# Patient Record
Sex: MALE | Race: WHITE | NOT HISPANIC OR LATINO | Employment: FULL TIME | URBAN - METROPOLITAN AREA
[De-identification: names, ages, dates, MRNs, and addresses within clinical notes are randomized per-mention and may not be internally consistent; named-entity substitution may affect disease eponyms.]

---

## 2021-08-02 ENCOUNTER — HOSPITAL ENCOUNTER (INPATIENT)
Facility: HOSPITAL | Age: 46
LOS: 2 days | Discharge: HOME | DRG: 683 | End: 2021-08-05
Attending: EMERGENCY MEDICINE | Admitting: INTERNAL MEDICINE

## 2021-08-02 ENCOUNTER — APPOINTMENT (EMERGENCY)
Dept: CT IMAGING | Facility: HOSPITAL | Age: 46
DRG: 683 | End: 2021-08-02

## 2021-08-02 DIAGNOSIS — S02.85XA CLOSED FRACTURE OF ORBIT, INITIAL ENCOUNTER (HCC): ICD-10-CM

## 2021-08-02 DIAGNOSIS — R74.01 ELEVATED TRANSAMINASE LEVEL: ICD-10-CM

## 2021-08-02 DIAGNOSIS — K82.8 CALCIFICATION OF GALLBLADDER: ICD-10-CM

## 2021-08-02 DIAGNOSIS — S02.401A CLOSED FRACTURE OF MAXILLARY SINUS, INITIAL ENCOUNTER (HCC): Primary | ICD-10-CM

## 2021-08-02 DIAGNOSIS — R77.8 TROPONIN LEVEL ELEVATED: ICD-10-CM

## 2021-08-02 DIAGNOSIS — T50.901A ACCIDENTAL OVERDOSE, INITIAL ENCOUNTER: ICD-10-CM

## 2021-08-02 DIAGNOSIS — R91.1 PULMONARY NODULE: ICD-10-CM

## 2021-08-02 DIAGNOSIS — R74.01 TRANSAMINITIS: ICD-10-CM

## 2021-08-02 DIAGNOSIS — R77.8 ELEVATED TROPONIN: ICD-10-CM
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
## 2021-08-02 DIAGNOSIS — R04.0 BLEEDING FROM THE NOSE: ICD-10-CM

## 2021-08-02 LAB
ALBUMIN SERPL BCP-MCNC: 4.6 G/DL (ref 3.5–5)
ALP SERPL-CCNC: 86 U/L (ref 46–116)
ALT SERPL W P-5'-P-CCNC: 176 U/L (ref 12–78)
ANION GAP SERPL CALCULATED.3IONS-SCNC: 21 MMOL/L (ref 4–13)
AST SERPL W P-5'-P-CCNC: 160 U/L (ref 5–45)
BASOPHILS # BLD AUTO: 0.04 THOUSANDS/ΜL (ref 0–0.1)
BASOPHILS NFR BLD AUTO: 0 % (ref 0–1)
BILIRUB SERPL-MCNC: 0.7 MG/DL (ref 0.2–1)
BUN SERPL-MCNC: 16 MG/DL (ref 5–25)
CALCIUM SERPL-MCNC: 8.7 MG/DL (ref 8.3–10.1)
CHLORIDE SERPL-SCNC: 102 MMOL/L (ref 100–108)
CO2 SERPL-SCNC: 21 MMOL/L (ref 21–32)
CREAT SERPL-MCNC: 1.77 MG/DL (ref 0.6–1.3)
EOSINOPHIL # BLD AUTO: 0.01 THOUSAND/ΜL (ref 0–0.61)
EOSINOPHIL NFR BLD AUTO: 0 % (ref 0–6)
ERYTHROCYTE [DISTWIDTH] IN BLOOD BY AUTOMATED COUNT: 12.8 % (ref 11.6–15.1)
GFR SERPL CREATININE-BSD FRML MDRD: 45 ML/MIN/1.73SQ M
GLUCOSE SERPL-MCNC: 122 MG/DL (ref 65–140)
HCT VFR BLD AUTO: 44.9 % (ref 36.5–49.3)
HGB BLD-MCNC: 15.4 G/DL (ref 12–17)
IMM GRANULOCYTES # BLD AUTO: 0.09 THOUSAND/UL (ref 0–0.2)
IMM GRANULOCYTES NFR BLD AUTO: 1 % (ref 0–2)
INR PPP: 1.11 (ref 0.84–1.19)
LYMPHOCYTES # BLD AUTO: 0.86 THOUSANDS/ΜL (ref 0.6–4.47)
LYMPHOCYTES NFR BLD AUTO: 7 % (ref 14–44)
MCH RBC QN AUTO: 31.6 PG (ref 26.8–34.3)
MCHC RBC AUTO-ENTMCNC: 34.3 G/DL (ref 31.4–37.4)
MCV RBC AUTO: 92 FL (ref 82–98)
MONOCYTES # BLD AUTO: 0.48 THOUSAND/ΜL (ref 0.17–1.22)
MONOCYTES NFR BLD AUTO: 4 % (ref 4–12)
NEUTROPHILS # BLD AUTO: 10.82 THOUSANDS/ΜL (ref 1.85–7.62)
NEUTS SEG NFR BLD AUTO: 88 % (ref 43–75)
NRBC BLD AUTO-RTO: 0 /100 WBCS
PLATELET # BLD AUTO: 247 THOUSANDS/UL (ref 149–390)
PMV BLD AUTO: 10.2 FL (ref 8.9–12.7)
POTASSIUM SERPL-SCNC: 3.7 MMOL/L (ref 3.5–5.3)
PROT SERPL-MCNC: 8 G/DL (ref 6.4–8.2)
PROTHROMBIN TIME: 14.4 SECONDS (ref 11.6–14.5)
RBC # BLD AUTO: 4.88 MILLION/UL (ref 3.88–5.62)
SODIUM SERPL-SCNC: 144 MMOL/L (ref 136–145)
TROPONIN I SERPL-MCNC: 0.18 NG/ML
WBC # BLD AUTO: 12.3 THOUSAND/UL (ref 4.31–10.16)

## 2021-08-02 PROCEDURE — 72125 CT NECK SPINE W/O DYE: CPT

## 2021-08-02 PROCEDURE — 99223 1ST HOSP IP/OBS HIGH 75: CPT | Performed by: EMERGENCY MEDICINE

## 2021-08-02 PROCEDURE — 93005 ELECTROCARDIOGRAM TRACING: CPT

## 2021-08-02 PROCEDURE — 96374 THER/PROPH/DIAG INJ IV PUSH: CPT

## 2021-08-02 PROCEDURE — 71260 CT THORAX DX C+: CPT

## 2021-08-02 PROCEDURE — 70486 CT MAXILLOFACIAL W/O DYE: CPT

## 2021-08-02 PROCEDURE — 70450 CT HEAD/BRAIN W/O DYE: CPT

## 2021-08-02 PROCEDURE — 36415 COLL VENOUS BLD VENIPUNCTURE: CPT | Performed by: EMERGENCY MEDICINE

## 2021-08-02 PROCEDURE — G1004 CDSM NDSC: HCPCS

## 2021-08-02 PROCEDURE — 85025 COMPLETE CBC W/AUTO DIFF WBC: CPT | Performed by: EMERGENCY MEDICINE

## 2021-08-02 PROCEDURE — 85610 PROTHROMBIN TIME: CPT | Performed by: EMERGENCY MEDICINE

## 2021-08-02 PROCEDURE — 99285 EMERGENCY DEPT VISIT HI MDM: CPT

## 2021-08-02 PROCEDURE — 74177 CT ABD & PELVIS W/CONTRAST: CPT

## 2021-08-02 PROCEDURE — 80053 COMPREHEN METABOLIC PANEL: CPT | Performed by: EMERGENCY MEDICINE

## 2021-08-02 PROCEDURE — 84484 ASSAY OF TROPONIN QUANT: CPT | Performed by: EMERGENCY MEDICINE

## 2021-08-02 PROCEDURE — 90471 IMMUNIZATION ADMIN: CPT

## 2021-08-02 RX ORDER — OXYMETAZOLINE HYDROCHLORIDE 0.05 G/100ML
2 SPRAY NASAL ONCE
Status: COMPLETED | OUTPATIENT
Start: 2021-08-02 | End: 2021-08-02

## 2021-08-02 RX ORDER — ONDANSETRON 2 MG/ML
4 INJECTION INTRAMUSCULAR; INTRAVENOUS ONCE
Status: COMPLETED | OUTPATIENT
Start: 2021-08-02 | End: 2021-08-02

## 2021-08-02 RX ORDER — ONDANSETRON 2 MG/ML
INJECTION INTRAMUSCULAR; INTRAVENOUS
Status: COMPLETED
Start: 2021-08-02 | End: 2021-08-02

## 2021-08-02 RX ADMIN — IOHEXOL 100 ML: 350 INJECTION, SOLUTION INTRAVENOUS at 22:57

## 2021-08-02 RX ADMIN — ONDANSETRON 4 MG: 2 INJECTION INTRAMUSCULAR; INTRAVENOUS at 22:21

## 2021-08-02 RX ADMIN — OXYMETAZOLINE HYDROCHLORIDE 2 SPRAY: 0.05 SPRAY NASAL at 23:00

## 2021-08-03 ENCOUNTER — APPOINTMENT (INPATIENT)
Dept: RADIOLOGY | Facility: HOSPITAL | Age: 46
DRG: 683 | End: 2021-08-03

## 2021-08-03 PROBLEM — S02.32XA CLOSED FRACTURE OF LEFT ORBITAL FLOOR (HCC): Status: ACTIVE | Noted: 2021-08-03

## 2021-08-03 PROBLEM — M25.532 PAIN AND SWELLING OF LEFT WRIST: Status: ACTIVE | Noted: 2021-08-03

## 2021-08-03 PROBLEM — T50.901A ACCIDENTAL DRUG OVERDOSE: Status: ACTIVE | Noted: 2021-08-03

## 2021-08-03 PROBLEM — D72.829 LEUKOCYTOSIS: Status: ACTIVE | Noted: 2021-08-03

## 2021-08-03 PROBLEM — R79.89 ELEVATED TROPONIN: Status: ACTIVE | Noted: 2021-08-03

## 2021-08-03 PROBLEM — R77.8 ELEVATED TROPONIN: Status: ACTIVE | Noted: 2021-08-03

## 2021-08-03 PROBLEM — N17.9 ACUTE KIDNEY INJURY (HCC): Status: ACTIVE | Noted: 2021-08-03

## 2021-08-03 PROBLEM — R74.01 ELEVATED TRANSAMINASE LEVEL: Status: ACTIVE | Noted: 2021-08-03
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
## 2021-08-03 PROBLEM — S02.401A CLOSED FRACTURE OF MAXILLARY SINUS (HCC): Status: ACTIVE | Noted: 2021-08-03

## 2021-08-03 PROBLEM — M25.432 PAIN AND SWELLING OF LEFT WRIST: Status: ACTIVE | Noted: 2021-08-03

## 2021-08-03 LAB
ALBUMIN SERPL BCP-MCNC: 4.2 G/DL (ref 3.5–5)
ALP SERPL-CCNC: 81 U/L (ref 46–116)
ALT SERPL W P-5'-P-CCNC: 395 U/L (ref 12–78)
AMPHETAMINES SERPL QL SCN: NEGATIVE
ANION GAP SERPL CALCULATED.3IONS-SCNC: 11 MMOL/L (ref 4–13)
APAP SERPL-MCNC: <2 UG/ML (ref 10–20)
AST SERPL W P-5'-P-CCNC: 346 U/L (ref 5–45)
ATRIAL RATE: 70 BPM
ATRIAL RATE: 98 BPM
BARBITURATES UR QL: NEGATIVE
BENZODIAZ UR QL: NEGATIVE
BILIRUB SERPL-MCNC: 0.37 MG/DL (ref 0.2–1)
BUN SERPL-MCNC: 19 MG/DL (ref 5–25)
CALCIUM SERPL-MCNC: 8.6 MG/DL (ref 8.3–10.1)
CHLORIDE SERPL-SCNC: 102 MMOL/L (ref 100–108)
CO2 SERPL-SCNC: 27 MMOL/L (ref 21–32)
COCAINE UR QL: NEGATIVE
CREAT SERPL-MCNC: 1.5 MG/DL (ref 0.6–1.3)
ERYTHROCYTE [DISTWIDTH] IN BLOOD BY AUTOMATED COUNT: 12.7 % (ref 11.6–15.1)
GFR SERPL CREATININE-BSD FRML MDRD: 55 ML/MIN/1.73SQ M
GLUCOSE SERPL-MCNC: 135 MG/DL (ref 65–140)
HCT VFR BLD AUTO: 41.4 % (ref 36.5–49.3)
HGB BLD-MCNC: 14 G/DL (ref 12–17)
MCH RBC QN AUTO: 30.8 PG (ref 26.8–34.3)
MCHC RBC AUTO-ENTMCNC: 33.8 G/DL (ref 31.4–37.4)
MCV RBC AUTO: 91 FL (ref 82–98)
METHADONE UR QL: NEGATIVE
OPIATES UR QL SCN: NEGATIVE
OXYCODONE+OXYMORPHONE UR QL SCN: NEGATIVE
P AXIS: 72 DEGREES
P AXIS: 75 DEGREES
PCP UR QL: NEGATIVE
PLATELET # BLD AUTO: 221 THOUSANDS/UL (ref 149–390)
PMV BLD AUTO: 9.9 FL (ref 8.9–12.7)
POTASSIUM SERPL-SCNC: 3.8 MMOL/L (ref 3.5–5.3)
PR INTERVAL: 154 MS
PR INTERVAL: 154 MS
PROCALCITONIN SERPL-MCNC: 6.93 NG/ML
PROT SERPL-MCNC: 7.5 G/DL (ref 6.4–8.2)
QRS AXIS: 58 DEGREES
QRS AXIS: 71 DEGREES
QRSD INTERVAL: 102 MS
QRSD INTERVAL: 98 MS
QT INTERVAL: 340 MS
QT INTERVAL: 412 MS
QTC INTERVAL: 434 MS
QTC INTERVAL: 444 MS
RBC # BLD AUTO: 4.55 MILLION/UL (ref 3.88–5.62)
SALICYLATES SERPL-MCNC: <3 MG/DL (ref 3–20)
SODIUM SERPL-SCNC: 140 MMOL/L (ref 136–145)
T WAVE AXIS: 57 DEGREES
T WAVE AXIS: 71 DEGREES
THC UR QL: NEGATIVE
TROPONIN I SERPL-MCNC: 0.72 NG/ML
TROPONIN I SERPL-MCNC: 0.84 NG/ML
VENTRICULAR RATE: 70 BPM
VENTRICULAR RATE: 98 BPM
WBC # BLD AUTO: 17.29 THOUSAND/UL (ref 4.31–10.16)

## 2021-08-03 PROCEDURE — 99254 IP/OBS CNSLTJ NEW/EST MOD 60: CPT | Performed by: NURSE PRACTITIONER

## 2021-08-03 PROCEDURE — 99223 1ST HOSP IP/OBS HIGH 75: CPT | Performed by: INTERNAL MEDICINE

## 2021-08-03 PROCEDURE — 93010 ELECTROCARDIOGRAM REPORT: CPT | Performed by: INTERNAL MEDICINE

## 2021-08-03 PROCEDURE — 80143 DRUG ASSAY ACETAMINOPHEN: CPT | Performed by: EMERGENCY MEDICINE

## 2021-08-03 PROCEDURE — 84145 PROCALCITONIN (PCT): CPT

## 2021-08-03 PROCEDURE — NC001 PR NO CHARGE: Performed by: SURGERY

## 2021-08-03 PROCEDURE — 86038 ANTINUCLEAR ANTIBODIES: CPT | Performed by: INTERNAL MEDICINE

## 2021-08-03 PROCEDURE — 93005 ELECTROCARDIOGRAM TRACING: CPT

## 2021-08-03 PROCEDURE — 84484 ASSAY OF TROPONIN QUANT: CPT | Performed by: INTERNAL MEDICINE

## 2021-08-03 PROCEDURE — 36415 COLL VENOUS BLD VENIPUNCTURE: CPT | Performed by: EMERGENCY MEDICINE

## 2021-08-03 PROCEDURE — 80179 DRUG ASSAY SALICYLATE: CPT | Performed by: EMERGENCY MEDICINE

## 2021-08-03 PROCEDURE — 85027 COMPLETE CBC AUTOMATED: CPT

## 2021-08-03 PROCEDURE — 84484 ASSAY OF TROPONIN QUANT: CPT | Performed by: EMERGENCY MEDICINE

## 2021-08-03 PROCEDURE — 90715 TDAP VACCINE 7 YRS/> IM: CPT | Performed by: PHYSICIAN ASSISTANT

## 2021-08-03 PROCEDURE — 80053 COMPREHEN METABOLIC PANEL: CPT

## 2021-08-03 PROCEDURE — 80307 DRUG TEST PRSMV CHEM ANLYZR: CPT

## 2021-08-03 PROCEDURE — 99222 1ST HOSP IP/OBS MODERATE 55: CPT | Performed by: PHYSICIAN ASSISTANT

## 2021-08-03 PROCEDURE — 73130 X-RAY EXAM OF HAND: CPT

## 2021-08-03 RX ORDER — ACETAMINOPHEN 325 MG/1
650 TABLET ORAL EVERY 6 HOURS PRN
Status: DISCONTINUED | OUTPATIENT
Start: 2021-08-03 | End: 2021-08-05 | Stop reason: HOSPADM

## 2021-08-03 RX ORDER — ONDANSETRON 2 MG/ML
4 INJECTION INTRAMUSCULAR; INTRAVENOUS ONCE
Status: COMPLETED | OUTPATIENT
Start: 2021-08-03 | End: 2021-08-03

## 2021-08-03 RX ORDER — KETOROLAC TROMETHAMINE 30 MG/ML
15 INJECTION, SOLUTION INTRAMUSCULAR; INTRAVENOUS EVERY 6 HOURS PRN
Status: DISCONTINUED | OUTPATIENT
Start: 2021-08-03 | End: 2021-08-03

## 2021-08-03 RX ORDER — SODIUM CHLORIDE 9 MG/ML
125 INJECTION, SOLUTION INTRAVENOUS CONTINUOUS
Status: DISCONTINUED | OUTPATIENT
Start: 2021-08-03 | End: 2021-08-05 | Stop reason: HOSPADM

## 2021-08-03 RX ADMIN — ONDANSETRON 4 MG: 2 INJECTION INTRAMUSCULAR; INTRAVENOUS at 01:35

## 2021-08-03 RX ADMIN — SODIUM CHLORIDE 100 ML/HR: 0.9 INJECTION, SOLUTION INTRAVENOUS at 03:25

## 2021-08-03 RX ADMIN — CEFTRIAXONE 1000 MG: 1 INJECTION, POWDER, FOR SOLUTION INTRAMUSCULAR; INTRAVENOUS at 07:10

## 2021-08-03 RX ADMIN — METRONIDAZOLE 500 MG: 500 INJECTION, SOLUTION INTRAVENOUS at 09:00

## 2021-08-03 RX ADMIN — TETANUS TOXOID, REDUCED DIPHTHERIA TOXOID AND ACELLULAR PERTUSSIS VACCINE, ADSORBED 0.5 ML: 5; 2.5; 8; 8; 2.5 SUSPENSION INTRAMUSCULAR at 02:05

## 2021-08-03 NOTE — ASSESSMENT & PLAN NOTE
Per trauma:    - OMFS consult placed and decided there was no acute surgical intervention indicated   - Multimodal pain regimen with avoidance of narcotics (currently on prn IV toradol 15mg)

## 2021-08-03 NOTE — ASSESSMENT & PLAN NOTE
Creatinine was noted to 1 77 with unknown baseline  Patient endorsed going to urgent care 2 months ago due thinking he had kidney stones but this was ruled out through imaging and urine studies at [facility] after seeing urology  Creatinine 0 60 - 1 30 mg/dL 1  77High           - negative urine drug screen  - will be started on NS 100cc/hour and see if resolution of CHAGO   - continue to monitor  Creatinine down to 1 04 this morning (8/4) after giving fluids

## 2021-08-03 NOTE — ASSESSMENT & PLAN NOTE
Noted elevated troponin levels at admission  This is possibly secondary to getting CPR by patient's girlfriend along with the drug overdose  Possibly a troponin leak  Ref Range & Units 8/3/21 0325 8/3/21 0135 8/2/21 2229   Troponin I <=0 04 ng/mL 0  84High   0  72High  CM  0  18High  CM          - elevated troponins x3 with EKG reading as normal sinus rhythm  - continue to monitor on telemetry for any cardiac events  - patient denies any chest pain (typical anginal chest pain) or palpitations     - Per cardiology recommendations checking echocardiogram
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
## 2021-08-03 NOTE — CONSULTS
Oral and Maxillofacial Surgery Consult    Patient Seen Date: 08/03/21 4:31 PM       HPI: Pt is 40 y o  male  has a past medical history of Chronic back pain  Consult requested by ED for orbital floor fx and maxillary sinus fracture  Patient reports that he took percocet in the bathroom and lost consciousness falling and hitting his face  PMH:   Past Medical History:   Diagnosis Date    Chronic back pain         Allergies:   No Known Allergies    Meds:     Current Facility-Administered Medications:     acetaminophen (TYLENOL) tablet 650 mg, 650 mg, Oral, Q6H PRN, Lala Kocher, MD    ceftriaxone (ROCEPHIN) 1 g/50 mL in dextrose IVPB, 1,000 mg, Intravenous, Q24H, Ashu Huffman DO, Stopped at 08/03/21 0800    sodium chloride 0 9 % infusion, 100 mL/hr, Intravenous, Continuous, Ashu Huffman DO, Last Rate: 100 mL/hr at 08/03/21 0325, 100 mL/hr at 08/03/21 0325    trimethobenzamide (TIGAN) IM injection 200 mg, 200 mg, Intramuscular, Q6H PRN, Keysha Huffman DO  No current outpatient medications on file  PSH:   Past Surgical History:   Procedure Laterality Date    WISDOM TOOTH EXTRACTION        Family History   Problem Relation Age of Onset    Cirrhosis Mother     Cholelithiasis Mother     Diabetes type II Father         Review of Systems   Constitutional: Negative for chills, fatigue and fever  HENT: Positive for facial swelling  Negative for congestion, dental problem, ear discharge, nosebleeds, sore throat and trouble swallowing  Eyes: Negative  Negative for photophobia, pain and visual disturbance  Respiratory: Negative  Cardiovascular: Negative  Skin: Negative  Neurological: Negative  Psychiatric/Behavioral: Negative  All other systems reviewed and are negative         Temp:  [98 1 °F (36 7 °C)] 98 1 °F (36 7 °C)  HR:  [] 86  Resp:  [15-19] 16  BP: (104-157)/(61-87) 134/74  SpO2:  [92 %-99 %] 98 %       Intake/Output Summary (Last 24 hours) at 8/3/2021 [facility] filed at 8/3/2021 0950  Gross per 24 hour   Intake 150 ml   Output 250 ml   Net -100 ml          Physical Exam:  Gen: AAOx3  NAD  Resp: Unlabored on RA  Neuro: bilateralCN V2-V3 Grossly Intact  bilateral CN VII grossly intact  Head: Grossly normal , mild Facial Swelling a/w left orbit  , mild ecchymosis  , No bony step-off palpated , TTP at left orbit  No LAD  Negative Trismus  negative  Guarding  Inferior border of the mandible is palpable  Eyes: Grossly Normal , EOMI with no signs of muscle entrapment, PERRLA, no subconjunctival hemorrhage  , mild left periorbital ecchymosis/edema  No changes to vision, diplopia, exophthalmos, enophthalmos  Ears: Grossly Normal  noblood visualized in the EAC  Denieschanges in hearing  Nose: Grossly Normal, Nares clear and dry, No septum deviation, No septal hematoma  Intraoral: MOODY normal  Occlusion stable  Teeth WNL/No changes  , Partially Edentulous maxilla/mandible , Root Tips #various teeth throughout oral cavity  , No laceration present , No vestibular swelling  and No purulence, or draining fistula  FOM soft, non-elevated, non-tender  Uvula midline  Imaging: I have personally reviewed pertinent reports  and I have personally reviewed pertinent films in PACS      Assessment:  40 y o  male presents with nondisplaced left orbital floor fx and maxillary sinus fx  Based on clinical and radiographic exam patient does not require acute surgical intervention from OMFS standpoint  Plan:  - Antibiotics: Augmentin 875/125mg PO BID x 7days  - Pain Control: Analgesia as per Primary Team  - Diet: No Diet restrictions from OMFS standpoint    - Peridex 15mL swish and spit BID x7d  - Encourage good oral hygiene  - head of bed elevated  - ice to affected area as needed  - Sinus precautions: 4 weeks: no nose blowing, avoid putting pressure on sinus area, avoid strenuous activity/straining, try to sneeze with mouth open  2 weeks: no straws, spitting, smoking   Use OTC Afrin BID 2 sprays/nostril 3 days maximum as needed, OTC decongestant (e g  Sudafed) or Antihistamine (e g  Claritin-D) as needed, and saline nasal spray as needed  - F/U: No follow up required  *Follow up only needed if issue arises  , Patient should see general dentist for general oral care  D/w OMFS attg on call    Consults     Counseling / Coordination of Care  Total floor / unit time spent today 30 minutes  Greater than 50% of total time was spent with the patient and / or family counseling and / or coordination of care  A description of the counseling / coordination of care: description of injury with proposed plan of care  Discussed risks, benefits, and alternatives to treatment plan  All questions were answered  Patient in agreement with plan

## 2021-08-03 NOTE — QUICK NOTE
Cervical Collar Clearance: The patient had a CT scan of the cervical spine demonstrating no acute injury  On exam, the patient had no midline point tenderness or paresthesias/numbness/weakness in the extremities  The patient had full range of motion (was then able to flex, extend, and rotate head laterally) without pain  There were no distracting injuries and the patient was not intoxicated  The patient's cervical spine was cleared radiologically and clinically  Cervical collar removed at this time       Ada Pelayo PA-C  8/3/2021 1:29 AM

## 2021-08-03 NOTE — ASSESSMENT & PLAN NOTE
Patient noted pain and swelling of the left wrist in the anatomic snuffbox distribution  There is some noted swelling in that region  - Xray of left hand has been ordered and resulted with no acute osseous abnormality

## 2021-08-03 NOTE — ASSESSMENT & PLAN NOTE
Fall secondary to drug overdose  At this point, he does not complain of much pain besides his thumb  With his history of opiate use and what brought about his fall, we will avoid any opioids       - pain control with prn toradol IV 15mg for moderate pain and continue to monitor patient's creatinine levels if improving

## 2021-08-03 NOTE — CASE MANAGEMENT
Admitted today, not a bundle or readmission  Patient is a low risk for readmission with risk for readmission sore of 5  Patient is alert and oriented x 4 and able to participate in CM assessment  CM met with patient to introduce self, explain role, complete CM assessment, and discuss DC planning  Patient resides with ana lilia Hidalgo in a first floor apartment with 3 YUE  Patient functioned independent PTA with no use of DME  No Hx HHC or STR  No LW or POA, declined CM offer for information at this time, reports ana lilia Hidalgo as healthcare representative  No Hx MH  Patient reports being a social drinker with no Hx IP/OP substance rehab  Patient reports no Hx drug use stating the percocet taken were for his back pain, and he now sees the mistake mixing substances with alcohol  Per chart review patient snorted percocet leading to LOC and fall, has history of cocaine use, and UDS completed resulted normal  Patient drives  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 ) and will require work note at [employer]  CM made SLIM aware of same  Patient does not have PCP and declined CM offer to establish patient with one  CM offered to provide InfoLink information for patient's future reference, patient would appreciate this  InfoLink information added to follow up providers  Patient reports having insurance stating it is a [employer] and he is having SO obtain the card and bring it in to provide to registration  Patient has prescription coverage and prefers using [facility] in [facility] for medications  Patient reports plan upon medical stability is to return home with no anticipated CM needs  SO to provide transportation  CM received consult for [facility]  CM contacted [facility]  to refer patient and provide necessary information  CRS to meet with patient and follow up with CM to provide update and discuss plan of care after they meet with patient       Patient reports no concerns from CM standpoint at this time  CM encouraged patient to reach out with any questions or concerns  CM will continue to follow for further care coordination needs

## 2021-08-03 NOTE — ASSESSMENT & PLAN NOTE
Per trauma:    - OMFS consult placed and currently pending  - Multimodal pain regimen with avoidance of narcotics (currently on prn IV toradol 15mg)

## 2021-08-03 NOTE — ASSESSMENT & PLAN NOTE
Creatinine was noted to 1 77 with unknown baseline  Patient endorsed going to urgent care 2 months ago due thinking he had kidney stones but this was ruled out through imaging and urine studies at [facility] after seeing urology  Creatinine 0 60 - 1 30 mg/dL 1  77High           - negative urine drug screen  - will be started on NS 100cc/hour and see if resolution of CHAGO

## 2021-08-03 NOTE — QUICK NOTE
Left hand x-ray was reviewed by me and Dr Mary Hodge with no evidence of acute traumatic injury  Final interpretation by radiology confirmed no acute osseous abnormality  Pain patient is experiences likely secondary to contusion from fall with no neurologic deficit noted  Patient also reported that his pain has been improving with rest and ice  OMS contacted regarding facial fractures and plan is for non operative management with formal consult to follow this afternoon  No additional workup from the trauma standpoint is necessary at this time  Patient may be discharged from a trauma standpoint following completion of the [facility] evaluation and formal recommendations  Chemical DVT prophylaxis is recommended if the patient is to remain admitted  Discussed with the primary service      Brittany Marcos PA-C  8/3/2021 2:02 PM

## 2021-08-03 NOTE — CONSULTS
Consultation - Cardiology Team One  Jean Claude Johnson 40 y o  male MRN: [id number]  Unit/Bed#: ED 18 Encounter: [id number]    Inpatient consult to Cardiology  Consult performed by: LETY Sigala  Consult ordered by: Judi Burk MD      Physician Requesting Consult: Jatinder Romero MD  Reason for Consult / Principal Problem:  Elevated troponin      Assessment/ Plan    1  Troponin elevation likely Non MI elevated troponin in the setting of #2   Troponin 0 18/0 72/0 84  Reviewed ECGs  Patient has no complaint of chest pain or SOB at rest or with exertion   Only cardiac family history is grandfather having heart attack in his [de-identified]  Check echocardiogram     2  Accidental drug overdose with Percocet  Followed by primary team   Status post 8 mg intranasal Narcan     3  Acute kidney injury  Creatinine 1 77 yesterday and 1 5 today  Receiving IVF  Followed by primary team     4  Transaminitis believed to be in the setting of # 2  History of Present Illness   HPI: Jean Claude Johnson is a 40y o  year old male who has no significant medical history  He presents to [facility] ER 8/2/2021 s/p unintentional drug overdose  Patient has chronic lower back pain and was taking percocet for relief  Patient notes he took percocet by mouth then crushed and snorted percocet prior to going into shower  He also had a beer prior to taking percocet  The next thing he remembers is waking up with EMS around him  Per significant other at bedside, she heard a loud thump and found patient on the floor bleeding from his nose  She reports he was unconscious but moaning and breathing but appeared distressed  She called 911 and was instructed to start CPR  She believes she performed CPR for a few minutes prior to EMS arrived  On arrival of EMS, he was given 8 mg intranasal Narcan and became conscious  He reports no history of syncope or near syncope  Patient works as a   He reports no chest pain or SOB at rest or with exertion  He denies tobacco use  He drinks about 4 times a week and 2-3 drinks at a time  He has family history of grandfather having heart attack in his [de-identified]  EKG reviewed personally:  Sinus rhythm  Ventricular rate 70 beats per minute  TX interval 154 milliseconds  QRSD 102 milliseconds   milliseconds  QTC interval 444 milliseconds    Telemetry reviewed personally:   Normal sinus rhythm     Review of Systems   Constitutional: Negative for chills, fever and malaise/fatigue  HENT: Negative for congestion  Cardiovascular: Negative for chest pain, dyspnea on exertion, leg swelling, orthopnea and palpitations  Respiratory: Negative for cough and shortness of breath  Musculoskeletal: Positive for back pain  Gastrointestinal: Negative for bloating, nausea and vomiting  Neurological: Negative for dizziness and light-headedness  Psychiatric/Behavioral: Negative for altered mental status  All other systems reviewed and are negative      Historical Information   Past Medical History:   Diagnosis Date    Chronic back pain      Past Surgical History:   Procedure Laterality Date    WISDOM TOOTH EXTRACTION       Social History     Substance and Sexual Activity   Alcohol Use Yes    Alcohol/week: 2 0 standard drinks    Types: 1 Glasses of wine, 1 Cans of beer per week    Comment: 4 drinks/week     Social History     Substance and Sexual Activity   Drug Use Yes    Types: Oxycodone, Marijuana, Cocaine    Comment: snorted cocain about 10 years ago for 1 year     Social History     Tobacco Use   Smoking Status Current Some Day Smoker    Types: Cigars   Smokeless Tobacco Never Used   Tobacco Comment    has a cigar about 2/month     Family History:   Family History   Problem Relation Age of Onset    Cirrhosis Mother     Cholelithiasis Mother     Diabetes type II Father        Meds/Allergies   all current active meds have been reviewed and current meds:   Current Facility-Administered Medications   Medication Dose Route Frequency    ceftriaxone (ROCEPHIN) 1 g/50 mL in dextrose IVPB  1,000 mg Intravenous Q24H    ketorolac (TORADOL) injection 15 mg  15 mg Intravenous Q6H PRN    sodium chloride 0 9 % infusion  100 mL/hr Intravenous Continuous    trimethobenzamide (TIGAN) IM injection 200 mg  200 mg Intramuscular Q6H PRN     sodium chloride, 100 mL/hr, Last Rate: 100 mL/hr (21 0325)        No Known Allergies    Objective   Vitals: Blood pressure 133/80, pulse 78, temperature 98 1 °F (36 7 °C), temperature source Oral, resp  rate 16, height 6' (1 829 m), weight 93 2 kg (205 lb 7 5 oz), SpO2 99 %  ,     Body mass index is 27 87 kg/m²  ,     Systolic (68ACV), KK , Min:104 , BVU:963     Diastolic (17GFY), VGJ:35, Min:61, Max:87            Intake/Output Summary (Last 24 hours) at 8/3/2021 1435  Last data filed at 8/3/2021 4113  Gross per 24 hour   Intake 50 ml   Output 250 ml   Net -200 ml     Weight (last 2 days)     Date/Time   Weight    21 0300   93 2 (205 47)            Invasive Devices     Peripheral Intravenous Line            Peripheral IV 21 Left Antecubital 1 day    Peripheral IV 21 Left; Lower Forearm <1 day                  Physical Exam  Constitutional:       General: He is not in acute distress  HENT:      Head: Normocephalic  Mouth/Throat:      Mouth: Mucous membranes are moist    Cardiovascular:      Rate and Rhythm: Normal rate and regular rhythm  Pulses: Normal pulses  Heart sounds: No murmur heard  Pulmonary:      Effort: Pulmonary effort is normal  No respiratory distress  Breath sounds: Normal breath sounds  Abdominal:      General: Bowel sounds are normal       Palpations: Abdomen is soft  Musculoskeletal:         General: No swelling  Normal range of motion  Cervical back: Neck supple  Skin:     General: Skin is warm and dry  Capillary Refill: Capillary refill takes less than 2 seconds        Comments: L orbital ecchymosis    Neurological: General: No focal deficit present  Mental Status: He is alert and oriented to person, place, and time  Psychiatric:         Mood and Affect: Mood normal            LABORATORY RESULTS:  Results from last 7 days   Lab Units 08/03/21  0325 08/03/21  0135 08/02/21  2229   TROPONIN I ng/mL 0 84* 0 72* 0 18*     CBC with diff:   Results from last 7 days   Lab Units 08/03/21  0510 08/02/21  2229   WBC Thousand/uL 17 29* 12 30*   HEMOGLOBIN g/dL 14 0 15 4   HEMATOCRIT % 41 4 44 9   MCV fL 91 92   PLATELETS Thousands/uL 221 247   MCH pg 30 8 31 6   MCHC g/dL 33 8 34 3   RDW % 12 7 12 8   MPV fL 9 9 10 2   NRBC AUTO /100 WBCs  --  0       CMP:  Results from last 7 days   Lab Units 08/03/21  0510 08/02/21  2229   POTASSIUM mmol/L 3 8 3 7   CHLORIDE mmol/L 102 102   CO2 mmol/L 27 21   BUN mg/dL 19 16   CREATININE mg/dL 1 50* 1 77*   CALCIUM mg/dL 8 6 8 7   AST U/L 346* 160*   ALT U/L 395* 176*   ALK PHOS U/L 81 86   EGFR ml/min/1 73sq m 55 45       BMP:  Results from last 7 days   Lab Units 08/03/21  0510 08/02/21  2229   POTASSIUM mmol/L 3 8 3 7   CHLORIDE mmol/L 102 102   CO2 mmol/L 27 21   BUN mg/dL 19 16   CREATININE mg/dL 1 50* 1 77*   CALCIUM mg/dL 8 6 8 7          No results found for: NTBNP                           Results from last 7 days   Lab Units 08/02/21  2229   INR  1 11     Lipid Profile:   No results found for: CHOL  No results found for: HDL  No results found for: LDLCALC  No results found for: TRIG      Cardiac testing:   No results found for this or any previous visit  No results found for this or any previous visit  No valid procedures specified  No results found for this or any previous visit  Imaging: I have personally reviewed pertinent reports  XR hand 3+ vw left    Result Date: 8/3/2021  Narrative: LEFT HAND INDICATION:   swelling, pain, thumb and first finger interspace   COMPARISON:  None VIEWS:  XR HAND 3+ VW LEFT For the purposes of institution wide universal language the following terms will apply: (thumb=1st digit/finger, index finger=2nd digit/finger, long finger=3rd digit/finger, ring=4th digit/finger and small finger=5th digit/finger) FINDINGS: There is no acute fracture or dislocation  No significant degenerative changes  No lytic or blastic osseous lesion  Thenar eminence soft tissue swelling     Impression: No acute osseous abnormality  Workstation performed: [id number]     CT head without contrast    Result Date: 8/3/2021  Narrative: CT BRAIN - WITHOUT CONTRAST INDICATION:   Head trauma, mod-severe fall, headstrike, nosebleed  COMPARISON:  None  TECHNIQUE:  CT examination of the brain was performed  In addition to axial images, sagittal and coronal 2D reformatted images were created and submitted for interpretation  Radiation dose length product (DLP) for this visit:  [phone] mGy-cm   This examination, like all CT scans performed in the [facility], was performed utilizing techniques to minimize radiation dose exposure, including the use of iterative reconstruction and automated exposure control  IMAGE QUALITY:  Diagnostic  FINDINGS: PARENCHYMA:  No intracranial mass, mass effect or midline shift  No CT signs of acute infarction  No acute parenchymal hemorrhage  VENTRICLES AND EXTRA-AXIAL SPACES:  Normal for the patient's age  VISUALIZED ORBITS AND PARANASAL SINUSES:  Please see the separate report of the concurrent facial CT for evaluation of facial and orbital trauma  CALVARIUM AND EXTRACRANIAL SOFT TISSUES:  Normal      Impression: No acute intracranial abnormality  Please see the separate report of the concurrent facial CT for evaluation of facial and orbital trauma  Workstation performed: [id number]     CT facial bones without contrast    Result Date: 8/3/2021  Narrative: CT FACIAL BONES WITHOUT INTRAVENOUS CONTRAST INDICATION:   Facial trauma fall, headstrike with LOC  Swelling over L inferior orbit  COMPARISON: None   TECHNIQUE:  Axial CT images were obtained through the facial bones with additional sagittal and coronal reconstructions  Radiation dose length product (DLP) for this visit:  489 mGy-cm   This examination, like all CT scans performed in the [facility], was performed utilizing techniques to minimize radiation dose exposure, including the use of iterative reconstruction and automated exposure control  IMAGE QUALITY:  Diagnostic  FINDINGS: FACIAL BONES: There is a fracture of the anterior wall of the left maxillary sinus with questionable fractures of the lateral and medial walls  Subcutaneous emphysema is noted subjacent to the anterior wall fracture  There is a fracture of the inferior orbital wall through the infraorbital foramen  The inferior rectus muscle abuts the wall however does not appear entrapped on this exam   Normal alignment of the temporomandibular joints  No lytic or blastic lesion  Multiple dental carious lesions are noted  Recommend elective dental evaluation  ORBITS:  Orbital globes, optic nerves, and extraocular muscles appear symmetric and normal  There is no evidence of retrobulbar mass, abscess, or hematoma  SINUSES:  Fluid and debris are seen within the left frontal and maxillary sinuses  Amanda Akers SOFT TISSUES:  Normal      Impression: There is a fracture of the anterior wall of the left maxillary sinus with questionable fractures of the lateral and medial walls  Subcutaneous emphysema is noted subjacent to the anterior wall fracture  There is a fracture of the inferior orbital wall through the infraorbital foramen  The inferior rectus muscle abuts the wall however does not appear entrapped on this exam  Fluid, likely blood, and debris are seen within the left frontal and maxillary sinuses    The study was marked in EPIC for immediate notification   Workstation performed: [id number]     CT spine cervical without contrast    Result Date: 8/2/2021  Narrative: CT CERVICAL SPINE - WITHOUT CONTRAST INDICATION:   Neck pain, recent trauma fall, headstrike  COMPARISON:  None  TECHNIQUE:  CT examination of the cervical spine was performed without intravenous contrast   Contiguous axial images were obtained  Sagittal and coronal reconstructions were performed  Radiation dose length product (DLP) for this visit:  369 mGy-cm   This examination, like all CT scans performed in the [facility], was performed utilizing techniques to minimize radiation dose exposure, including the use of iterative reconstruction and automated exposure control  IMAGE QUALITY:  Diagnostic  FINDINGS: ALIGNMENT:  Normal alignment of the cervical spine  No subluxation  VERTEBRAL BODIES:  No fracture  DEGENERATIVE CHANGES:  No significant cervical degenerative changes are noted  PREVERTEBRAL AND PARASPINAL SOFT TISSUES:  Unremarkable  THORACIC INLET:  Normal      Impression: No cervical spine fracture or traumatic malalignment  Workstation performed: [id number]     CT chest abdomen pelvis w contrast    Result Date: 8/2/2021  Narrative: CT CHEST, ABDOMEN AND PELVIS WITH IV CONTRAST INDICATION:   Chest-abdomen-pelvis trauma, blunt fall with LOC, abdominal pain, vomiting  Received chest compressions    COMPARISON:  None  TECHNIQUE: CT examination of the chest, abdomen and pelvis was performed  Axial, sagittal, and coronal 2D reformatted images were created from the source data and submitted for interpretation  Radiation dose length product (DLP) for this visit:  621 mGy-cm   This examination, like all CT scans performed in the [facility], was performed utilizing techniques to minimize radiation dose exposure, including the use of iterative reconstruction and automated exposure control  IV Contrast:  100 mL of iohexol (OMNIPAQUE) Enteric Contrast: Enteric contrast was administered  FINDINGS: CHEST LUNGS:  7 x 4 mm nodule in the right lower lobe (2:34)   Based on current Fleischner Society 2017 Guidelines on incidental pulmonary nodule, followup non-contrast CT is recommended at 6-12 months from the initial examination and, if stable at that time, an additional followup is recommended for 18-24 months from the initial examination  Lungs are otherwise clear  There is no tracheal or endobronchial lesion  PLEURA:  Unremarkable  HEART/GREAT VESSELS:  Unremarkable for patient's age  MEDIASTINUM AND NARAYAN:  Small hiatal hernia noted  No mediastinal or hilar lymphadenopathy  CHEST WALL AND LOWER NECK:   Unremarkable  ABDOMEN LIVER/BILIARY TREE:  Liver is diffusely decreased in density consistent with fatty change  No CT evidence of suspicious hepatic mass  Normal hepatic contours  No biliary dilatation  GALLBLADDER: Questionable calcification in the wall of the gallbladder raises the possibility of gallbladder adenomyomatosis  Recommend elective ultrasound for further evaluation  No calcified gallstones  No pericholecystic inflammatory change  SPLEEN:  Unremarkable  PANCREAS:  Unremarkable  ADRENAL GLANDS:  Unremarkable  KIDNEYS/URETERS:  Unremarkable  No hydronephrosis  STOMACH AND BOWEL:  Unremarkable  APPENDIX:  A normal appendix was visualized  ABDOMINOPELVIC CAVITY:  No ascites  No pneumoperitoneum  No lymphadenopathy  VESSELS:  Unremarkable for patient's age  PELVIS REPRODUCTIVE ORGANS:  Unremarkable for patient's age  URINARY BLADDER:  Unremarkable  ABDOMINAL WALL/INGUINAL REGIONS:  Unremarkable  OSSEOUS STRUCTURES:  No acute fracture or destructive osseous lesion  Impression: 7 x 4 mm nodule in the right lower lobe (2:34)  Based on current Fleischner Society 2017 Guidelines on incidental pulmonary nodule, followup non-contrast CT is recommended at 6-12 months from the initial examination and, if stable at that time, an additional followup is recommended for 18-24 months from the initial examination  Questionable calcification in the wall of the gallbladder raises the possibility of gallbladder adenomyomatosis   Recommend elective ultrasound for further evaluation  Otherwise unremarkable CT of the chest, abdomen and pelvis with IV without oral contrast  Workstation performed: [id number]     Thank you for allowing us to participate in this patient's care  Counseling / Coordination of Care  Total floor / unit time spent today 45 minutes  Greater than 50% of total time was spent with the patient and / or family counseling and / or coordination of care  A description of the counseling / coordination of care: Review of history, current assessment, development of a plan  Code Status: Level 1 - Full Code    ** Please Note: Dragon 360 Dictation voice to text software may have been used in the creation of this document   **

## 2021-08-03 NOTE — ASSESSMENT & PLAN NOTE
Fall secondary to drug overdose  At this point, he does not complain of much pain besides his thumb   With his history of opiate use and what brought about his fall, we will avoid any opioids      - PT/OT to evaluate  - pain control with prn toradol IV 15mg for moderate pain and continue to monitor patient's creatinine levels if improving

## 2021-08-03 NOTE — ASSESSMENT & PLAN NOTE
AST 5 - 45 U/L 160High     Comment: Specimen collection should occur prior to Sulfasalazine administration due to the potential for falsely depressed results  ALT 12 - 78 U/L 176High     Comment: Specimen collection should occur prior to Sulfasalazine administration due to the potential for falsely depressed results  Ref Range & Units 8/3/21 0030   Acetaminophen Level 10 - 20 ug/mL <2Low         Noted elevated AST/ALT levels that could be secondary to drug use  - holding off on tylenol medications at this time  - continue to monitor liver function with daily CMPs     - negative urine drug screen  - possibly secondary to drug overdose usage concurrent with alcohol intake

## 2021-08-03 NOTE — ASSESSMENT & PLAN NOTE
Due to history of snorting percocet and getting it from friend/dealer as well as past history of cocaine use, there is high suspicion patient has recently taken other drugs  In addition to having elevated AST/ALT and alcohol use, it'd be worth getting a urine toxicology screen         - urine drug screen ordered was negative

## 2021-08-03 NOTE — ASSESSMENT & PLAN NOTE
- Fall from standing secondary to accidental overdose  - With below noted injuries  - Pain control - avoid narcotics with recent overdose  - PT/OT

## 2021-08-03 NOTE — ASSESSMENT & PLAN NOTE
- Left orbital floor fracture without entrapment with extension through the inferior orbital foramen  - Left facial sensation intact   - OMFS consult placed  - Multimodal pain regimen with avoidance of narcotics

## 2021-08-03 NOTE — CONSULTS
[address] 1975, 40 y o  male MRN: [id number]  Unit/Bed#: ED 18 Encounter: [id number]  Primary Care Provider: No primary care provider on file  Date and time admitted to hospital: 8/2/2021  9:44 PM    [place]  - Fall from standing secondary to accidental overdose  - With below noted injuries  - Pain control - avoid narcotics with recent overdose  - PT/OT    Closed fracture of left orbital floor [facility])  Assessment & Plan  - Left orbital floor fracture without entrapment with extension through the inferior orbital foramen  - OMFS consult placed  - Multimodal pain regimen with avoidance of narcotics      Closed fracture of maxillary sinus (HCC)  Assessment & Plan  - Left inferior maxillary sinus fracture with possible lateral and medial wall fractures - POA  - Blood and debris seen within the left maxillary and frontal sinuses  - OMFS consult   - Keep HOB >45 degrees  - [place] at bedside   - Sinus precautions   - Multimodal pain regimen    Accidental drug overdose  Assessment & Plan  - reports 15mg percocet by mouth and 15mg percocet crushed and snorted with LOC and fall  - Monitor for withdrawal      Assessment/Plan   Trauma Alert: Evaluation  Model of Arrival: Ambulance  Trauma Team: Attending Triny Ivey and SINA Truong 49  Consultants: Other: OMFS  Time Called [phone], Returned call: No    Chief Complaint: Fall    History of Present Illness   HPI:  Immanuel Meza is a 40 y o  male with pmh of chronic back pain who [facility] after an accidental overdose on percocet  Patient reports he took one percocet by mouth and then snorted one prior to getting into the shower when the next thing he knew he woke up with EMS around him  His girlfriend is present bedside and reports she heard a bang and found the patient laying on the bathroom floor gurgling  She called 911 and was instructed to start CPR which she did   He reports pain in the left side of his face, his left thumb, nausea, and intermittent nasal bleeding  He denies headache, blurry vision, chest pain, shortness of breath, abdominal pain, or neck/back pain  Mechanism:Fall    Review of Systems   Constitutional: Negative for activity change, appetite change, chills, fatigue and fever  HENT: Positive for facial swelling and nosebleeds  Negative for congestion, drooling, ear discharge, hearing loss, mouth sores, postnasal drip, rhinorrhea, sinus pressure and sinus pain  Eyes: Negative for photophobia, pain, redness and visual disturbance  Respiratory: Negative for cough, shortness of breath and wheezing  Cardiovascular: Negative for chest pain and palpitations  Gastrointestinal: Positive for nausea and vomiting  Negative for abdominal distention, abdominal pain, constipation and diarrhea  Genitourinary: Negative for flank pain, frequency, hematuria and urgency  Musculoskeletal: Negative for back pain, myalgias, neck pain and neck stiffness  Neurological: Positive for facial asymmetry (secondary to swelling)  Negative for seizures, syncope, weakness, numbness and headaches  12-point, complete review of systems was reviewed and negative except as stated above  Historical Information     Past Medical History:   Diagnosis Date    Chronic back pain      Past Surgical History:   Procedure Laterality Date    WISDOM TOOTH EXTRACTION       Social History   Social History     Substance and Sexual Activity   Alcohol Use Yes    Comment: daily      Social History     Substance and Sexual Activity   Drug Use Yes    Types: Oxycodone, Marijuana     Social History     Tobacco Use   Smoking Status Current Some Day Smoker    Types: Cigars   Smokeless Tobacco Never Used     E-Cigarette/Vaping     E-Cigarette/Vaping Substances       There is no immunization history on file for this patient    Last Tetanus: >5 years ago  Family History: Non-contributory      Meds/Allergies   all current active meds have been reviewed    No Known Allergies      PHYSICAL EXAM    Objective   Vitals:   First set: Temperature: 98 1 °F (36 7 °C) (08/02/21 2145)  Pulse: 94 (08/02/21 2145)  Respirations: 16 (08/02/21 2145)  Blood Pressure: 148/78 (08/02/21 2145)    Primary Survey:   (A) Airway: intact  (B) Breathing: equal bilaterally  (C) Circulation: Pulses:   pedal  2/4, radial  2/4 and femoral  2/4  (D) Disabliity:  GCS Total:  15  (E) Expose:  Completed    Secondary Survey: (Click on Physical Exam tab above)  Physical Exam  Vitals and nursing note reviewed  Constitutional:       General: He is not in acute distress  Appearance: Normal appearance  He is not ill-appearing or toxic-appearing  HENT:      Head: Normocephalic  Abrasion (left zygomatic arch) present  No raccoon eyes, contusion or laceration  Jaw: There is normal jaw occlusion  Right Ear: Hearing and tympanic membrane normal       Left Ear: Hearing and tympanic membrane normal       Nose: No nasal deformity, signs of injury or laceration  Left Nostril: Epistaxis (resolved) present  Left Sinus: Maxillary sinus tenderness and frontal sinus tenderness present  Mouth/Throat:      Mouth: Mucous membranes are moist       Pharynx: Oropharynx is clear  No oropharyngeal exudate or posterior oropharyngeal erythema  Eyes:      Extraocular Movements: Extraocular movements intact  Conjunctiva/sclera: Conjunctivae normal       Pupils: Pupils are equal, round, and reactive to light  Cardiovascular:      Rate and Rhythm: Normal rate and regular rhythm  Heart sounds: No murmur heard  No friction rub  No gallop  Pulmonary:      Effort: Pulmonary effort is normal       Breath sounds: No wheezing, rhonchi or rales  Abdominal:      General: Abdomen is flat  There is no distension  Palpations: Abdomen is soft  Tenderness: There is no guarding or rebound     Musculoskeletal:      Right shoulder: Normal  Left shoulder: Normal       Right upper arm: Normal       Left upper arm: Normal       Right elbow: Normal       Left elbow: Normal       Right forearm: Normal       Left forearm: Normal       Right wrist: Normal       Left wrist: Normal       Left hand: Swelling (interspace thumb and first finger) and tenderness present  Decreased range of motion  Cervical back: Normal range of motion  No deformity, signs of trauma or tenderness  Thoracic back: No signs of trauma or tenderness  Lumbar back: No signs of trauma or tenderness  Right hip: Normal       Left hip: Normal       Right upper leg: Normal       Left upper leg: Normal       Right knee: Normal       Left knee: Normal       Right lower leg: Normal       Left lower leg: Normal       Right ankle: Normal       Left ankle: Normal       Right foot: Normal       Left foot: Normal    Skin:     General: Skin is warm  Capillary Refill: Capillary refill takes less than 2 seconds  Findings: Bruising (left face) present  Neurological:      General: No focal deficit present  Mental Status: He is alert and oriented to person, place, and time  GCS: GCS eye subscore is 4  GCS verbal subscore is 5  GCS motor subscore is 6  Cranial Nerves: No cranial nerve deficit  Sensory: Sensation is intact  Motor: Motor function is intact           Invasive Devices     Peripheral Intravenous Line            Peripheral IV 08/02/21 Left Antecubital 1 day                Lab Results:   BMP/CMP:   Lab Results   Component Value Date    SODIUM 144 08/02/2021    K 3 7 08/02/2021     08/02/2021    CO2 21 08/02/2021    BUN 16 08/02/2021    CREATININE 1 77 (H) 08/02/2021    CALCIUM 8 7 08/02/2021     (H) 08/02/2021     (H) 08/02/2021    ALKPHOS 86 08/02/2021    EGFR 45 08/02/2021   , CBC:   Lab Results   Component Value Date    WBC 12 30 (H) 08/02/2021    HGB 15 4 08/02/2021    HCT 44 9 08/02/2021    MCV 92 08/02/2021  08/02/2021    MCH 31 6 08/02/2021    MCHC 34 3 08/02/2021    RDW 12 8 08/02/2021    MPV 10 2 08/02/2021    NRBC 0 08/02/2021   , Coagulation:   Lab Results   Component Value Date    INR 1 11 08/02/2021   , AST:   Lab Results   Component Value Date     (H) 08/02/2021   , ALT:   Lab Results   Component Value Date     (H) 08/02/2021   , Troponin:   Lab Results   Component Value Date    TROPONINI 0 18 (H) 08/02/2021    and UDS: No components found for: RAPIDDRUGSCREEN  Imaging/EKG Studies: CT Scan Head: No intracranial hemorrhage or skull fracture, CT Scan C-Spine: No fracture or traumatic malalignment, CT Chest: No fracture or traumatic injury, CT Scan Abdomen/Pelvis: No traumatic injury, CT Scan Face: Left inferior maxillary sinus fracture with possible lateral and medial wall fractures, subcutaneous emphysema subjacent to the anterior wall fracture, inferior orbital wall fracture through the infraorbital foramen  Inferior rectus muscle abuts the wall however does not appear entrapped  Fluid, likely blood, and debris seen within the left frontal and maxillary sinuses    Other Studies: none    Code Status: No Order
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
## 2021-08-03 NOTE — ASSESSMENT & PLAN NOTE
- AST/ALT elevated on admission with increase overnight   - suspect secondary to Percocet use  - Continue to monitor

## 2021-08-03 NOTE — ASSESSMENT & PLAN NOTE
AST 5 - 45 U/L 160High     Comment: Specimen collection should occur prior to Sulfasalazine administration due to the potential for falsely depressed results  ALT 12 - 78 U/L 176High     Comment: Specimen collection should occur prior to Sulfasalazine administration due to the potential for falsely depressed results  Ref Range & Units 8/3/21 0030   Acetaminophen Level 10 - 20 ug/mL <2Low         Noted elevated AST/ALT levels that could be secondary to drug use  - holding off on tylenol medications at this time  - continue to monitor liver function with daily CMPs     - possibly secondary to drug overdose usage concurrent with alcohol intake   - Today  and ALT 1,181 still increasing

## 2021-08-03 NOTE — ASSESSMENT & PLAN NOTE
- reports 15mg percocet by mouth and 15mg percocet crushed and snorted with LOC and fall  - Monitor for withdrawal

## 2021-08-03 NOTE — ASSESSMENT & PLAN NOTE
- Cr 1 7 on admission down to 1 5 this am  - with clinical evidence of dehydration  - Continue IVF and monitor kidney function

## 2021-08-03 NOTE — ASSESSMENT & PLAN NOTE
- Left inferior maxillary sinus fracture with possible lateral and medial wall fractures - POA  - Blood and debris seen within the left maxillary and frontal sinuses  - OMFS consult   - Keep HOB >45 degrees  - [place] at bedside   - Sinus precautions   - Multimodal pain regimen

## 2021-08-03 NOTE — ASSESSMENT & PLAN NOTE
Noted elevated troponin levels at admission  This is possibly secondary to getting CPR by patient's girlfriend along with the drug overdose  Possibly a troponin leak  Ref Range & Units 8/3/21 0135 8/2/21 2229   Troponin I <=0 04 ng/mL 0  72High   0  18High  CM                - pending 3rd troponin level at 03:30am to be taken   - EKG was ordered and was read as normal sinus rhythm

## 2021-08-03 NOTE — ASSESSMENT & PLAN NOTE
Per Trauma recommendations:    -  Left inferior maxillary sinus fracture with possible lateral and medial wall fractures - POA  - Blood and debris seen within the left maxillary and frontal sinuses  - OMFS consult and pending  - Keep HOB >45 degrees  - [place] at bedside   - Sinus precautions   - Multimodal pain regimen (currently on prn IV toradol 15mg)

## 2021-08-03 NOTE — ASSESSMENT & PLAN NOTE
- continued rise in troponin level overnight  - ?  Secondary to CPR vs ACS  - without chest pain, shortness of breath  - continue to trend

## 2021-08-03 NOTE — ED PROVIDER NOTES
Emergency Department Trauma Note  Roslyn Bumpers 40 y o  male MRN: [id number]  Unit/Bed#: ED 18/ED 18 Encounter: [id number]      Trauma Alert: Trauma Acuity: C  Model of Arrival: Mode of Arrival: ALS via    Trauma Team: Current Providers  Attending Provider: Cherise Ngo MD  Attending Provider: Gabrielle Martínez MD  Registered Nurse: Abdoul Quiñones RN  Registered Nurse: Madeleine Freeman RN  Advanced Practitioner: Binh Salcido PA-C  Consultants: None      History of Present Illness     Chief Complaint:   Chief Complaint   Patient presents with    Overdose - Accidental     Pt arrives via EMS, states he snorted a Perc 27 and was in the shower and passed out  States he believes he passed out and then hit his head on tub  As per EMS, pt's GF heard a crash and found the pt unconscious in a heap in the tub  Abrasion, ecchymosis and swelling noted to L side of forehead, L lower orbital area, and pain in L hand  Arrives with c-collar inplace, denies neck or back pain  As per EMS pt was given 8mg intranasal narcan before arousing  HPI:  Roslyn Bumpers is a 40 y o  male who presents after an episode of unresponsiveness with fall  Mechanism:Details of Incident: please see complaint narrative in chart Injury Date: 08/02/21 Injury Time: 2030 Injury Occurence Location - [address] [place] [address] Way: in pt's bathroom     78-year-old male presenting for evaluation after his girlfriend found him unresponsive outside the shower  Patient states that he has been having pain in his bilateral low back which is chronic and at baseline for the last 20 years  He snorted a Percocet 30 and then got in the shower  The next thing he remembers is waking up on the ground after EMS administered Narcan  The patient's girlfriend tells me that she heard a crash in ran into the bathroom  She found him right outside the shower face-down on the bathroom floor  She rolled him over and states that he had shallow gurgling respirations    She called EMS and was instructed to start CPR  She does not think that the patient ever stopped breathing  At the time of EMS arrival, patient was given 8 mg of intranasal Narcan at which point he woke up and was alert  He was noted to be bleeding from his bilateral nares, most prominently from the left  At the time of arrival to the emergency department patient is reporting nausea and mild abdominal pain which I think is from throwing up   He had an episode of emesis which was blood tinged, likely from his nosebleed  He continues to have slight dripping of blood from the left nostril  Denies headache or vision changes  Patient states that he only occasionally uses Percocet took only 1 this evening  He denies heroin use  Does state that he drink 2 beers at around 8:30 p m  Bell Monson Denies additional drug use  Denies pain in his neck or back after the fall  Reports mild pain to the left lateral rib cage where his girlfriend perform CPR  Patient states felt well earlier in the day and denies experiencing chest pain, shortness of breath, or palpitations prior to the incident  HPI  Review of Systems   Constitutional: Negative for chills and fever  HENT: Positive for facial swelling and nosebleeds  Negative for congestion  Eyes: Negative for visual disturbance  Respiratory: Negative for cough and shortness of breath  Cardiovascular: Negative for chest pain and leg swelling  Gastrointestinal: Positive for abdominal pain, nausea and vomiting  Negative for diarrhea  Genitourinary: Negative for flank pain  Musculoskeletal: Positive for back pain (bilateral low back, chronic and at baseline)  Negative for arthralgias, neck pain and neck stiffness  Skin: Negative for rash  Neurological: Negative for dizziness, weakness, numbness and headaches  Psychiatric/Behavioral: Negative for agitation, behavioral problems and confusion  Historical Information     Immunizations:    There is no immunization history on file for this patient  Past Medical History:   Diagnosis Date    Chronic back pain      History reviewed  No pertinent family history  Past Surgical History:   Procedure Laterality Date    WISDOM TOOTH EXTRACTION       Social History     Tobacco Use    Smoking status: Current Some Day Smoker     Types: Cigars    Smokeless tobacco: Never Used   Substance Use Topics    Alcohol use: Yes     Comment: daily     Drug use: Yes     Types: Oxycodone, Marijuana     E-Cigarette/Vaping     E-Cigarette/Vaping Substances       Family History: non-contributory    Meds/Allergies   None       No Known Allergies    PHYSICAL EXAM    PE limited by: none    Objective   Vitals:   First set: Temperature: 98 1 °F (36 7 °C) (08/02/21 2145)  Pulse: 94 (08/02/21 2145)  Respirations: 16 (08/02/21 2145)  Blood Pressure: 148/78 (08/02/21 2145)  SpO2: 98 % (08/02/21 2145)    Primary Survey:   (A) Airway: intact  (B) Breathing: bilateral breath sounds present  (C) Circulation: Pulses:   normal  (D) Disabliity:  GCS Total:  15  (E) Expose:  Completed    Secondary Survey: (Click on Physical Exam tab above)  Physical Exam  Constitutional:       General: He is not in acute distress  Appearance: He is well-developed  He is not diaphoretic  HENT:      Head: Normocephalic  Comments: Swelling over the left inferior orbit with overlying ecchymosis  Left frontal hematoma  No hemotympanum bilaterally     Right Ear: External ear normal       Left Ear: External ear normal       Nose: Nose normal       Comments: No nasal septal hematoma  Slow dripping of blood from the left nare  R nare with dried blood  Slight swelling over the nasal bridge without deviation  Eyes:      Extraocular Movements: Extraocular movements intact  Conjunctiva/sclera: Conjunctivae normal       Pupils: Pupils are equal, round, and reactive to light  Neck:      Comments: C collar in place    No midline tenderness to palpation  Cardiovascular: Rate and Rhythm: Normal rate and regular rhythm  Pulses: Normal pulses  Heart sounds: Normal heart sounds  No murmur heard  No friction rub  No gallop  Pulmonary:      Effort: Pulmonary effort is normal  No respiratory distress  Breath sounds: Normal breath sounds  No wheezing or rales  Comments: Abrasions noted over the bilateral chest wall  No bruising  Tenderness to palpation over the inferior portion of the left lateral chest wall without crepitus  Abdominal:      General: Bowel sounds are normal  There is no distension  Palpations: Abdomen is soft  Tenderness: There is no abdominal tenderness  There is no guarding  Comments: Abdomen benign to deep palpation  Abrasions noted to the bilateral flanks without ecchymosis   Musculoskeletal:         General: No deformity  Normal range of motion  Comments: Extremities atraumatic  No track marks   Skin:     General: Skin is warm and dry  Neurological:      Mental Status: He is alert and oriented to person, place, and time  Motor: No abnormal muscle tone  Comments: Normal speech  Cranial nerves 2-12 intact  5/5 strength in the proximal and distal bilateral upper and lower extremities   Psychiatric:         Mood and Affect: Mood normal          Cervical spine cleared by clinical criteria?  No (imaging required)      Invasive Devices     Peripheral Intravenous Line            Peripheral IV 08/02/21 Left Antecubital 1 day                Lab Results:   Results Reviewed     Procedure Component Value Units Date/Time    Troponin I [[id number]]     Lab Status: No result Specimen: Blood     Acetaminophen level-"If concentration is detectable, please discuss with medical  on call " [[id number]]  (Abnormal) Collected: 08/03/21 0030    Lab Status: Final result Specimen: Blood from Arm, Right Updated: 08/03/21 0056     Acetaminophen Level <2 ug/mL     Salicylate level [[id number]]  (Abnormal) Collected: 08/03/21 0030    Lab Status: Final result Specimen: Blood from Arm, Right Updated: 22/47/62 8486     Salicylate Lvl <3 mg/dL     Troponin I [[id number]]  (Abnormal) Collected: 08/02/21 2229    Lab Status: Final result Specimen: Blood from Arm, Left Updated: 08/02/21 2330     Troponin I 0 18 ng/mL     Comprehensive metabolic panel [[id number]]  (Abnormal) Collected: 08/02/21 2229    Lab Status: Final result Specimen: Blood from Arm, Left Updated: 08/02/21 2327     Sodium 144 mmol/L      Potassium 3 7 mmol/L      Chloride 102 mmol/L      CO2 21 mmol/L      ANION GAP 21 mmol/L      BUN 16 mg/dL      Creatinine 1 77 mg/dL      Glucose 122 mg/dL      Calcium 8 7 mg/dL       U/L       U/L      Alkaline Phosphatase 86 U/L      Total Protein 8 0 g/dL      Albumin 4 6 g/dL      Total Bilirubin 0 70 mg/dL      eGFR 45 ml/min/1 73sq m     Narrative:      [place] guidelines for Chronic Kidney Disease (CKD):     Stage 1 with normal or high GFR (GFR > 90 mL/min/1 73 square meters)    Stage 2 Mild CKD (GFR = 60-89 mL/min/1 73 square meters)    Stage 3A Moderate CKD (GFR = 45-59 mL/min/1 73 square meters)    Stage 3B Moderate CKD (GFR = 30-44 mL/min/1 73 square meters)    Stage 4 Severe CKD (GFR = 15-29 mL/min/1 73 square meters)    Stage 5 End Stage CKD (GFR <15 mL/min/1 73 square meters)  Note: GFR calculation is accurate only with a steady state creatinine    Protime-INR [[id number]]  (Normal) Collected: 08/02/21 2229    Lab Status: Final result Specimen: Blood from Arm, Left Updated: 08/02/21 2320     Protime 14 4 seconds      INR 1 11    CBC and differential [[id number]]  (Abnormal) Collected: 08/02/21 2229    Lab Status: Final result Specimen: Blood from Arm, Left Updated: 08/02/21 2316     WBC 12 30 Thousand/uL      RBC 4 88 Million/uL      Hemoglobin 15 4 g/dL      Hematocrit 44 9 %      MCV 92 fL      MCH 31 6 pg      MCHC 34 3 g/dL      RDW 12 8 %      MPV 10 2 fL      Platelets 142 Thousands/uL      nRBC 0 /100 WBCs      Neutrophils Relative 88 %      Immat GRANS % 1 %      Lymphocytes Relative 7 %      Monocytes Relative 4 %      Eosinophils Relative 0 %      Basophils Relative 0 %      Neutrophils Absolute 10 82 Thousands/µL      Immature Grans Absolute 0 09 Thousand/uL      Lymphocytes Absolute 0 86 Thousands/µL      Monocytes Absolute 0 48 Thousand/µL      Eosinophils Absolute 0 01 Thousand/µL      Basophils Absolute 0 04 Thousands/µL                  Imaging Studies:   Direct to CT: No  CT head without contrast   Final Result by Jose Mckeon MD (08/03 0001)      No acute intracranial abnormality  Please see the separate report of the concurrent facial CT for evaluation of facial and orbital trauma  Workstation performed: [id number]         CT spine cervical without contrast   Final Result by Jose Mckeon MD (08/02 2310)      No cervical spine fracture or traumatic malalignment  Workstation performed: [id number]         CT chest abdomen pelvis w contrast   Final Result by Jose Mckeon MD (08/02 3974)      7 x 4 mm nodule in the right lower lobe (2:34)  Based on current Fleischner Society 2017 Guidelines on incidental pulmonary nodule, followup non-contrast CT is recommended at 6-12 months from the initial examination and, if stable at that time, an    additional followup is recommended for 18-24 months from the initial examination  Questionable calcification in the wall of the gallbladder raises the possibility of gallbladder adenomyomatosis  Recommend elective ultrasound for further evaluation  Otherwise unremarkable CT of the chest, abdomen and pelvis with IV without oral contrast        Workstation performed: [id number]         CT facial bones without contrast   Final Result by Jose Mckeon MD (08/03 0001)      There is a fracture of the anterior wall of the left maxillary sinus with questionable fractures of the lateral and medial walls  Subcutaneous emphysema is noted subjacent to the anterior wall fracture  There is a fracture of the inferior orbital wall through the infraorbital foramen  The inferior rectus muscle abuts the wall however does not appear entrapped on this exam       Fluid, likely blood, and debris are seen within the left frontal and maxillary sinuses         The study was marked in EPIC for immediate notification  Workstation performed: [id number]               Procedures  Procedures         ED Course           MDM  Number of Diagnoses or Management Options  Accidental overdose, initial encounter: new and requires workup  Bleeding from the nose  Calcification of gallbladder  Closed fracture of orbit, initial encounter [facility]): new and requires workup  Pulmonary nodule  Troponin level elevated: new and requires workup  Diagnosis management comments:   I personally interpreted the patient's EKG which reveals normal rate, normal sinus rhythm, normal axis, normal intervals, no ischemic changes  Patient denies chest pain but troponin is elevated to 0 18  Possibly in the setting of CPR  Will admit for observation and continue to trend  CT head with no acute intracranial abnormalities  CT of the cervical spine with no acute fracture or malalignment  CT scan of the chest, abdomen, and pelvis without traumatic findings  Patient does have a nodule to the right lower lobe with recommendation for follow-up non contrasted CT in 6-12 months  This was discussed with the patient and his girlfriend at bedside  Patient has a questionable calcification in the wall the gallbladder raising concern for possible adenomyomatosis, this finding was also discussed with recommendation for elective outpatient ultrasound      CT of the facial bones with multiple facial fractures including the anterior wall of the left maxillary sinus with questionable fractures the lateral and medial walls, and a fracture of the inferior orbital wall through the infraorbital foramen  No evidence of entrapment on exam     Trauma consult and evaluated the patient at bedside  They have cleared the patient from a trauma perspective to be admitted to [facility] for further management of his elevated troponin and lab abnormalities  Patient had multiple episodes of vomiting in the emergency department, with blood in his emesis  He denies history of GI bleed and reports epigastric discomfort since he started vomiting but not preceding his fall  Suspect that the blood in his vomit is coming from his nasopharynx rather than from gastric bleeding  Nose bleed is hemostatic with Afrin and applying pressure  Creatinine elevated to 1 77 with unclear baseline  Mild transaminitis  Given report of snorting Percocet earlier in the night, 4 hour Tylenol level was drawn which is undetectable  Doubt Tylenol overdose  Patient denies chronic Tylenol use or chronic Percocet use to suggest chronic overdose         Amount and/or Complexity of Data Reviewed  Clinical lab tests: ordered and reviewed  Tests in the radiology section of CPT®: ordered and reviewed  Review and summarize past medical records: yes  Discuss the patient with other providers: yes  Independent visualization of images, tracings, or specimens: yes    Patient Progress  Patient progress: improved        Disposition  [facility] Transfer: No  Final diagnoses:   Accidental overdose, initial encounter   Troponin level elevated   Closed fracture of orbit, initial encounter ([facility] )   Bleeding from the nose   Pulmonary nodule   Calcification of gallbladder     Time reflects when diagnosis was documented in both MDM as applicable and the Disposition within this note     Time User Action Codes Description Comment    8/3/2021 12:36 AM Gillian Orellana Add [S02 401A] Closed fracture of maxillary sinus, initial encounter ([facility] )     8/3/2021  1:04 AM Juan Mayen Add [T50 901A] Accidental overdose, initial encounter     8/3/2021 1:04 AM Jessica Ghent Add [R77 8] Troponin level elevated     8/3/2021  1:05 AM Jessica Honor Add [S02 85XA] Closed fracture of orbit, initial encounter ([facility] )     8/3/2021  1:05 AM Jessica Ghent Add [R04 0] Bleeding from the nose     8/3/2021  1:05 AM Jessica Ghent Add [R91 1] Pulmonary nodule     8/3/2021  1:05 AM Jessica Honor Add [K82 8] Calcification of gallbladder       ED Disposition     ED Disposition Condition Date/Time Comment    Admit Stable Tu Aug 3, 2021  1:04 AM Case was discussed with SCOTT and the patient's admission status was agreed to be Admission Status: inpatient status to the service of Dr Ángela Manley  Follow-up Information    None       Patient's Medications    No medications on file     No discharge procedures on file      PDMP Review     None          ED Provider  Electronically Signed by         Rebecca Maravilla MD  08/03/21 0107       Rebecca Maravilla MD  08/03/21 6316

## 2021-08-03 NOTE — ASSESSMENT & PLAN NOTE
Per Trauma recommendations:    -  Left inferior maxillary sinus fracture with possible lateral and medial wall fractures - POA  - Blood and debris seen within the left maxillary and frontal sinuses  - Keep HOB >45 degrees  - Victoria at bedside   - Sinus precautions   - Multimodal pain regimen (currently on prn IV toradol 15mg)  - From trauma perspective he is cleared for discharge

## 2021-08-03 NOTE — PROGRESS NOTES
[facility]  Progress Note - Roslyn Bumpers 1975, 40 y o  male MRN: [id number]  Unit/Bed#: ED 18 Encounter: [id number]  Primary Care Provider: No primary care provider on file  Date and time admitted to hospital: 8/2/2021  9:44 PM    [place] from standing secondary to accidental overdose  - With below noted injuries  - Pain control - avoid narcotics with recent overdose  - PT/OT    Closed fracture of left orbital floor [facility])  Assessment & Plan  - Left orbital floor fracture without entrapment with extension through the inferior orbital foramen  - Left facial sensation intact   - OMFS consult placed  - Multimodal pain regimen with avoidance of narcotics      Closed fracture of maxillary sinus (HCC)  Assessment & Plan  - Left inferior maxillary sinus fracture with possible lateral and medial wall fractures - POA  - Blood and debris seen within the left maxillary and frontal sinuses  - OMFS consult   - Keep HOB >45 degrees  - Mary Carmenuer at bedside   - Sinus precautions   - Multimodal pain regimen    Elevated troponin  Assessment & Plan  - continued rise in troponin level overnight  - ?  Secondary to CPR vs ACS  - without chest pain, shortness of breath  - continue to trend    Elevated transaminase level  Assessment & Plan  - AST/ALT elevated on admission with increase overnight   - suspect secondary to Percocet use  - Continue to monitor     Acute kidney injury ([facility] [address] )  Assessment & Plan  - Cr 1 7 on admission down to 1 5 this am  - with clinical evidence of dehydration  - Continue IVF and monitor kidney function    * Accidental drug overdose  Assessment & Plan  - reports 15mg percocet by mouth and 15mg percocet crushed and snorted with LOC and fall  - Monitor for withdrawal        TERTIARY TRAUMA SURVEY NOTE    Prophylaxis: Sequential compression device (Venodyne)     Disposition: Pending ongoing medical treatment    Code status:  Level 1 - Full Code    Consultants: Trauma, OMFS    Is the patient 72 years or older?: No          SUBJECTIVE:     Transfer from: home  Outside Films Received: not applicable  Tertiary Exam Due on: 8/3/21    Mechanism of Injury:Fall    Details related to Injury: accidental overdose with LOC and fall     Chief Complaint: Left hand pain    HPI/Last 24 hour events: Reports no pain in his face, nasal bleeding and nausea resolved  Denies blurry or double vision  Left hand pain with swelling  Active medications:           Current Facility-Administered Medications:     ceftriaxone (ROCEPHIN) 1 g/50 mL in dextrose IVPB, 1,000 mg, Intravenous, Q24H    ketorolac (TORADOL) injection 15 mg, 15 mg, Intravenous, Q6H PRN    metroNIDAZOLE (FLAGYL) IVPB (premix) 500 mg 100 mL, 500 mg, Intravenous, Q8H    sodium chloride 0 9 % infusion, 100 mL/hr, Intravenous, Continuous, 100 mL/hr at 08/03/21 0325    trimethobenzamide (TIGAN) IM injection 200 mg, 200 mg, Intramuscular, Q6H PRN  No current outpatient medications on file  OBJECTIVE:     Vitals:   Vitals:    08/03/21 0400   BP: 110/66   Pulse: 82   Resp: 16   Temp:    SpO2: 97%       Physical Exam:   GENERAL APPEARANCE: no acute distress, sitting up in bed  NEURO: AAOX3, GCS 15, no focal neuro deficit  HEENT: PERRL, EOMI, left facial sensation intact, mucus membranes moist  CV: RRR S1 S2 without murmur, rub, or gallop  LUNGS: Clear to ausc bilaterally without wheezes, crackles, or rhonchi  GI: Soft, non-tender, non-distended  : Voiding independently  MSK: Left hand with swelling and tenderness in thumb/first finger webspace  SKIN: abrasions left face with swelling  I/O:   I/O     None          Invasive Devices: Invasive Devices     Peripheral Intravenous Line            Peripheral IV 08/02/21 Left Antecubital 1 day    Peripheral IV 08/03/21 Left; Lower Forearm <1 day                  Imaging:   CT head without contrast    Result Date: 8/3/2021  Impression: No acute intracranial abnormality   Please see the separate report of the concurrent facial CT for evaluation of facial and orbital trauma  Workstation performed: [id number]     CT facial bones without contrast    Result Date: 8/3/2021  Impression: There is a fracture of the anterior wall of the left maxillary sinus with questionable fractures of the lateral and medial walls  Subcutaneous emphysema is noted subjacent to the anterior wall fracture  There is a fracture of the inferior orbital wall through the infraorbital foramen  The inferior rectus muscle abuts the wall however does not appear entrapped on this exam  Fluid, likely blood, and debris are seen within the left frontal and maxillary sinuses    The study was marked in EPIC for immediate notification  Workstation performed: [id number]     CT spine cervical without contrast    Result Date: 8/2/2021  Impression: No cervical spine fracture or traumatic malalignment  Workstation performed: [id number]     CT chest abdomen pelvis w contrast    Result Date: 8/2/2021  Impression: 7 x 4 mm nodule in the right lower lobe (2:34)  Based on current Fleischner Society 2017 Guidelines on incidental pulmonary nodule, followup non-contrast CT is recommended at 6-12 months from the initial examination and, if stable at that time, an additional followup is recommended for 18-24 months from the initial examination  Questionable calcification in the wall of the gallbladder raises the possibility of gallbladder adenomyomatosis  Recommend elective ultrasound for further evaluation   Otherwise unremarkable CT of the chest, abdomen and pelvis with IV without oral contrast  Workstation performed: [id number]       Labs:   CBC:   Lab Results   Component Value Date    WBC 17 29 (H) 08/03/2021    HGB 14 0 08/03/2021    HCT 41 4 08/03/2021    MCV 91 08/03/2021     08/03/2021    MCH 30 8 08/03/2021    MCHC 33 8 08/03/2021    RDW 12 7 08/03/2021    MPV 9 9 08/03/2021    NRBC 0 08/02/2021     CMP:   Lab Results   Component Value Date     08/03/2021    CO2 27 08/03/2021    BUN 19 08/03/2021    CREATININE 1 50 (H) 08/03/2021    CALCIUM 8 6 08/03/2021     (H) 08/03/2021     (H) 08/03/2021    ALKPHOS 81 08/03/2021    EGFR 55 08/03/2021     Troponin:   Lab Results   Component Value Date    TROPONINI 0 84 (H) 08/03/2021

## 2021-08-03 NOTE — ASSESSMENT & PLAN NOTE
- Left orbital floor fracture without entrapment with extension through the inferior orbital foramen  - OMFS consult placed  - Multimodal pain regimen with avoidance of narcotics

## 2021-08-04 ENCOUNTER — APPOINTMENT (INPATIENT)
Dept: RADIOLOGY | Facility: HOSPITAL | Age: 46
DRG: 683 | End: 2021-08-04

## 2021-08-04 ENCOUNTER — APPOINTMENT (INPATIENT)
Dept: NON INVASIVE DIAGNOSTICS | Facility: HOSPITAL | Age: 46
DRG: 683 | End: 2021-08-04

## 2021-08-04 ENCOUNTER — APPOINTMENT (INPATIENT)
Dept: ULTRASOUND IMAGING | Facility: HOSPITAL | Age: 46
DRG: 683 | End: 2021-08-04

## 2021-08-04 PROBLEM — R74.01 TRANSAMINITIS: Status: ACTIVE | Noted: 2021-08-04

## 2021-08-04 LAB
ALBUMIN SERPL BCP-MCNC: 3.6 G/DL (ref 3.5–5)
ALBUMIN SERPL BCP-MCNC: 3.7 G/DL (ref 3.5–5)
ALP SERPL-CCNC: 71 U/L (ref 46–116)
ALP SERPL-CCNC: 74 U/L (ref 46–116)
ALT SERPL W P-5'-P-CCNC: 1181 U/L (ref 12–78)
ALT SERPL W P-5'-P-CCNC: 1291 U/L (ref 12–78)
ANION GAP SERPL CALCULATED.3IONS-SCNC: 8 MMOL/L (ref 4–13)
AST SERPL W P-5'-P-CCNC: 862 U/L (ref 5–45)
AST SERPL W P-5'-P-CCNC: 978 U/L (ref 5–45)
BILIRUB DIRECT SERPL-MCNC: 0.12 MG/DL (ref 0–0.2)
BILIRUB SERPL-MCNC: 0.42 MG/DL (ref 0.2–1)
BILIRUB SERPL-MCNC: 0.56 MG/DL (ref 0.2–1)
BUN SERPL-MCNC: 13 MG/DL (ref 5–25)
CALCIUM SERPL-MCNC: 8.6 MG/DL (ref 8.3–10.1)
CHLORIDE SERPL-SCNC: 106 MMOL/L (ref 100–108)
CHOLEST SERPL-MCNC: 180 MG/DL (ref 50–200)
CK MB SERPL-MCNC: 1.5 % (ref 0–2.5)
CK MB SERPL-MCNC: 47.2 NG/ML (ref 0–5)
CK SERPL-CCNC: 3208 U/L (ref 39–308)
CO2 SERPL-SCNC: 27 MMOL/L (ref 21–32)
CREAT SERPL-MCNC: 1.04 MG/DL (ref 0.6–1.3)
ERYTHROCYTE [DISTWIDTH] IN BLOOD BY AUTOMATED COUNT: 12.6 % (ref 11.6–15.1)
EST. AVERAGE GLUCOSE BLD GHB EST-MCNC: 97 MG/DL
GFR SERPL CREATININE-BSD FRML MDRD: 86 ML/MIN/1.73SQ M
GLUCOSE SERPL-MCNC: 96 MG/DL (ref 65–140)
HAV IGM SER QL: NORMAL
HBA1C MFR BLD: 5 %
HBV CORE IGM SER QL: NORMAL
HBV SURFACE AG SER QL: NORMAL
HCT VFR BLD AUTO: 40.2 % (ref 36.5–49.3)
HCV AB SER QL: NORMAL
HDLC SERPL-MCNC: 47 MG/DL
HGB BLD-MCNC: 14 G/DL (ref 12–17)
INR PPP: 1.04 (ref 0.84–1.19)
LDH SERPL-CCNC: 772 U/L (ref 81–234)
LDLC SERPL CALC-MCNC: 120 MG/DL (ref 0–100)
MCH RBC QN AUTO: 31.9 PG (ref 26.8–34.3)
MCHC RBC AUTO-ENTMCNC: 34.8 G/DL (ref 31.4–37.4)
MCV RBC AUTO: 92 FL (ref 82–98)
NONHDLC SERPL-MCNC: 133 MG/DL
PLATELET # BLD AUTO: 169 THOUSANDS/UL (ref 149–390)
PMV BLD AUTO: 10 FL (ref 8.9–12.7)
POTASSIUM SERPL-SCNC: 3.7 MMOL/L (ref 3.5–5.3)
PROCALCITONIN SERPL-MCNC: 7.3 NG/ML
PROT SERPL-MCNC: 6.6 G/DL (ref 6.4–8.2)
PROT SERPL-MCNC: 6.6 G/DL (ref 6.4–8.2)
PROTHROMBIN TIME: 13.7 SECONDS (ref 11.6–14.5)
RBC # BLD AUTO: 4.39 MILLION/UL (ref 3.88–5.62)
SODIUM SERPL-SCNC: 141 MMOL/L (ref 136–145)
TRIGL SERPL-MCNC: 65 MG/DL
WBC # BLD AUTO: 6.96 THOUSAND/UL (ref 4.31–10.16)

## 2021-08-04 PROCEDURE — 99232 SBSQ HOSP IP/OBS MODERATE 35: CPT | Performed by: NURSE PRACTITIONER

## 2021-08-04 PROCEDURE — 80074 ACUTE HEPATITIS PANEL: CPT | Performed by: INTERNAL MEDICINE

## 2021-08-04 PROCEDURE — 85027 COMPLETE CBC AUTOMATED: CPT

## 2021-08-04 PROCEDURE — 99232 SBSQ HOSP IP/OBS MODERATE 35: CPT | Performed by: INTERNAL MEDICINE

## 2021-08-04 PROCEDURE — 85610 PROTHROMBIN TIME: CPT | Performed by: INTERNAL MEDICINE

## 2021-08-04 PROCEDURE — 80061 LIPID PANEL: CPT

## 2021-08-04 PROCEDURE — 84145 PROCALCITONIN (PCT): CPT

## 2021-08-04 PROCEDURE — 93306 TTE W/DOPPLER COMPLETE: CPT

## 2021-08-04 PROCEDURE — 93306 TTE W/DOPPLER COMPLETE: CPT | Performed by: INTERNAL MEDICINE

## 2021-08-04 PROCEDURE — 86235 NUCLEAR ANTIGEN ANTIBODY: CPT | Performed by: INTERNAL MEDICINE

## 2021-08-04 PROCEDURE — 76705 ECHO EXAM OF ABDOMEN: CPT

## 2021-08-04 PROCEDURE — 80076 HEPATIC FUNCTION PANEL: CPT | Performed by: PHYSICIAN ASSISTANT

## 2021-08-04 PROCEDURE — 71046 X-RAY EXAM CHEST 2 VIEWS: CPT

## 2021-08-04 PROCEDURE — 99253 IP/OBS CNSLTJ NEW/EST LOW 45: CPT | Performed by: INTERNAL MEDICINE

## 2021-08-04 PROCEDURE — 36415 COLL VENOUS BLD VENIPUNCTURE: CPT

## 2021-08-04 PROCEDURE — 83615 LACTATE (LD) (LDH) ENZYME: CPT | Performed by: PHYSICIAN ASSISTANT

## 2021-08-04 PROCEDURE — 82550 ASSAY OF CK (CPK): CPT | Performed by: PHYSICIAN ASSISTANT

## 2021-08-04 PROCEDURE — 82553 CREATINE MB FRACTION: CPT | Performed by: PHYSICIAN ASSISTANT

## 2021-08-04 PROCEDURE — 83036 HEMOGLOBIN GLYCOSYLATED A1C: CPT

## 2021-08-04 PROCEDURE — 80053 COMPREHEN METABOLIC PANEL: CPT

## 2021-08-04 RX ADMIN — CEFTRIAXONE 1000 MG: 1 INJECTION, POWDER, FOR SOLUTION INTRAMUSCULAR; INTRAVENOUS at 05:20

## 2021-08-04 RX ADMIN — SODIUM CHLORIDE 125 ML/HR: 0.9 INJECTION, SOLUTION INTRAVENOUS at 16:33

## 2021-08-04 NOTE — INCIDENTAL FINDINGS
The following findings require follow up:  Radiographic finding   Finding:  7 x 4 mm nodule in the right lower lobe   Follow up required: Please follow up with your primary care provider in 1 month  Radiology recommended follow up non-contrast CT at 6-12 months from the initial examination, if stable at that time, an additional followup is recommended for 18-24 months from the initial examination  Follow up should be done within month(s)    Please notify the following clinician to assist with the follow up:    Patient does not have a PCP, however referral has been made for patient to establish care

## 2021-08-04 NOTE — CASE MANAGEMENT
CM contacted financial counselor Gale Ocampo as patient is showing as self pay  As per CM assessment yesterday, he reports he has a Familiar and copy is scanned into the media tab  Gale Ocampo reports that the policy does not appear to be in effect; she will call him and potentially refer to PATHS if he does not have any other coverage  As per SLIM rounds, patient is anticipated for discharge in 24-48 hrs pending lab work and full Cardiology and GI work up  [facility] aware that patient has been admitted to the floor, as a referral had been made yesterday  CM Dept will continue to follow

## 2021-08-04 NOTE — PROGRESS NOTES
General Cardiology   Progress Note -  Team One   Roslyn Bumpers 40 y o  male MRN: [id number]    Unit/Bed#: S -01 Encounter: [id number]    Assessment/ Plan    1  Troponin elevation likely Non MI elevated troponin in the setting of #2   Troponin 0 18/0 72/0 84  Reviewed ECGs  Patient has no complaint of chest pain or SOB at rest or with exertion   Echocardiogram pending      2  Accidental drug overdose with Percocet ? Followed by primary team   Status post 8 mg intranasal Narcan      3  Acute kidney injury  Creatinine 1 77 on admission  Creatinine improved to 1 0 today   Received IVF   Followed by primary team      4  Transaminitis   LFTs trending up   GI consult pending      Subjective  Patient is resting in bed  He offers no complaint of chest pain or SOB  He is eager to go home  Review of Systems   Constitutional: Negative for chills, fever and malaise/fatigue  HENT: Negative for congestion  Cardiovascular: Negative for chest pain, dyspnea on exertion and leg swelling  Respiratory: Negative for shortness of breath  Musculoskeletal: Negative for falls  Gastrointestinal: Negative for bloating, nausea and vomiting  Neurological: Negative for dizziness and light-headedness  Psychiatric/Behavioral: Negative for altered mental status  All other systems reviewed and are negative  Objective:   Vitals: Blood pressure 157/88, pulse 77, temperature 98 2 °F (36 8 °C), temperature source Oral, resp  rate 19, height 6' (1 829 m), weight 85 1 kg (187 lb 9 6 oz), SpO2 98 %  ,       Body mass index is 25 44 kg/m²  ,     Systolic (77VOY), ILS:280 , Min:104 , STORM:628     Diastolic (90CGO), BPX:36, Min:51, Max:88        No intake or output data in the 24 hours ending 08/04/21 1144  Weight (last 2 days)     Date/Time   Weight    08/04/21 0900   85 1 (187 6)    08/03/21 0300   93 2 (205 47)            Telemetry Review: Normal sinus rhythm     Physical Exam  Constitutional:       General: He is not in acute distress  HENT:      Head: Normocephalic  Mouth/Throat:      Mouth: Mucous membranes are moist    Cardiovascular:      Rate and Rhythm: Normal rate and regular rhythm  Pulses: Normal pulses  Heart sounds: No murmur heard  Pulmonary:      Effort: Pulmonary effort is normal  No respiratory distress  Breath sounds: Normal breath sounds  Abdominal:      General: Bowel sounds are normal       Palpations: Abdomen is soft  Musculoskeletal:      Cervical back: Neck supple  Skin:     General: Skin is warm and dry  Capillary Refill: Capillary refill takes less than 2 seconds  Comments: R orbital ecchymosis    Neurological:      General: No focal deficit present  Mental Status: He is alert and oriented to person, place, and time     Psychiatric:         Mood and Affect: Mood normal          LABORATORY RESULTS  Results from last 7 days   Lab Units 08/04/21  0916 08/03/21  0325 08/03/21  0135 08/02/21  2229   CK TOTAL U/L 3,208*  --   --   --    TROPONIN I ng/mL  --  0 84* 0 72* 0 18*   CK MB INDEX % 1 5  --   --   --      CBC with diff:   Results from last 7 days   Lab Units 08/04/21  0505 08/03/21  0510 08/02/21  2229   WBC Thousand/uL 6 96 17 29* 12 30*   HEMOGLOBIN g/dL 14 0 14 0 15 4   HEMATOCRIT % 40 2 41 4 44 9   MCV fL 92 91 92   PLATELETS Thousands/uL 169 221 247   MCH pg 31 9 30 8 31 6   MCHC g/dL 34 8 33 8 34 3   RDW % 12 6 12 7 12 8   MPV fL 10 0 9 9 10 2   NRBC AUTO /100 WBCs  --   --  0       CMP:  Results from last 7 days   Lab Units 08/04/21  0505 08/03/21  0510 08/02/21  2229   POTASSIUM mmol/L 3 7 3 8 3 7   CHLORIDE mmol/L 106 102 102   CO2 mmol/L 27 27 21   BUN mg/dL 13 19 16   CREATININE mg/dL 1 04 1 50* 1 77*   CALCIUM mg/dL 8 6 8 6 8 7   AST U/L 978* 346* 160*   ALT U/L 1,181* 395* 176*   ALK PHOS U/L 71 81 86   EGFR ml/min/1 73sq m 86 55 45       BMP:  Results from last 7 days   Lab Units 08/04/21  0505 08/03/21  0510 08/02/21  2229   POTASSIUM mmol/L 3 7 3 8 3 7   CHLORIDE mmol/L 106 102 102   CO2 mmol/L 27 27 21   BUN mg/dL 13 19 16   CREATININE mg/dL 1 04 1 50* 1 77*   CALCIUM mg/dL 8 6 8 6 8 7       No results found for: NTBNP                 Results from last 7 days   Lab Units 08/04/21  0505   HEMOGLOBIN A1C % 5 0              Results from last 7 days   Lab Units 08/04/21  1105 08/02/21  2229   INR  1 04 1 11       Lipid Profile:   No results found for: CHOL  Lab Results   Component Value Date    HDL 47 08/04/2021     Lab Results   Component Value Date    LDLCALC 120 (H) 08/04/2021     Lab Results   Component Value Date    TRIG 65 08/04/2021       Cardiac testing:   No results found for this or any previous visit  No results found for this or any previous visit  No results found for this or any previous visit  No valid procedures specified  No results found for this or any previous visit  Meds/Allergies   all current active meds have been reviewed and current meds:   Current Facility-Administered Medications   Medication Dose Route Frequency    acetaminophen (TYLENOL) tablet 650 mg  650 mg Oral Q6H PRN    ceftriaxone (ROCEPHIN) 1 g/50 mL in dextrose IVPB  1,000 mg Intravenous Q24H    sodium chloride 0 9 % infusion  125 mL/hr Intravenous Continuous    trimethobenzamide (TIGAN) IM injection 200 mg  200 mg Intramuscular Q6H PRN     No medications prior to admission  sodium chloride, 125 mL/hr, Last Rate: 125 mL/hr (08/04/21 1057)        Counseling / Coordination of Care  Total floor / unit time spent today 20 minutes  Greater than 50% of total time was spent with the patient and / or family counseling and / or coordination of care  ** Please Note: Dragon 360 Dictation voice to text software may have been used in the creation of this document   **

## 2021-08-04 NOTE — ASSESSMENT & PLAN NOTE
Noted elevated troponin levels at admission  This is possibly secondary to getting CPR by patient's girlfriend along with the drug overdose  Possibly a troponin leak  Ref Range & Units 8/3/21 0325 8/3/21 0135 8/2/21 2229   Troponin I <=0 04 ng/mL 0  84High   0  72High  CM  0  18High  CM          - elevated troponins x3 with EKG reading as normal sinus rhythm  - continue to monitor on telemetry for any cardiac events  - patient denies any chest pain (typical anginal chest pain) or palpitations  - Per cardiology recommendations checking echocardiogram; echocardiogram came back as: Systolic function was normal  Ejection fraction was estimated to be 60 %  There were no regional wall motion abnormalities

## 2021-08-04 NOTE — ASSESSMENT & PLAN NOTE
AST 5 - 45 U/L 160High     Comment: Specimen collection should occur prior to Sulfasalazine administration due to the potential for falsely depressed results  ALT 12 - 78 U/L 176High     Comment: Specimen collection should occur prior to Sulfasalazine administration due to the potential for falsely depressed results  Ref Range & Units 8/3/21 0030   Acetaminophen Level 10 - 20 ug/mL <2Low         Noted elevated AST/ALT levels that could be secondary to drug use  - holding off on tylenol medications at this time  - continue to monitor liver function with daily CMPs  - possibly secondary to drug overdose usage concurrent with alcohol intake  Now thinking it is secondary to ischemia, patient had elevated troponin x3 initially  - Today  and  and downtrending, increasing the likelihood the LFT elevation was secondary to ischemic hepatitis
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
## 2021-08-04 NOTE — ED NOTES
Patient transported to room 304 and placed on telemetry, transmission to central station confirmed   RN aware and PCA at bedside upon arrival     Sherri Mayer  08/04/21 0901

## 2021-08-04 NOTE — ASSESSMENT & PLAN NOTE
Creatinine was noted to 1 77 with unknown baseline  Patient endorsed going to urgent care 2 months ago due thinking he had kidney stones but this was ruled out through imaging and urine studies at [facility] after seeing urology  Creatinine 0 60 - 1 30 mg/dL 1  77High           - negative urine drug screen  - will be started on NS 100cc/hour and see if resolution of CHAGO   - Resolution of CHAGO

## 2021-08-04 NOTE — ED NOTES
Pt resting comfortably in bed in no signs of distress  Pt reports not needing anything at the moment and will use call chen if needed       Abida Aldrich RN  08/04/21 0025

## 2021-08-04 NOTE — CONSULTS
Consultation - 126 [facility]  Pete Michael 40 y o  male MRN: [id number]  Unit/Bed#: S -01 Encounter: [id number]       Assessment/Plan:     1  Elevated LFTs  · Patient presents to the hospital after being found unconscious on the floor of his bathroom  Patient had ingested/states snorted a tablet of what he believed was Percocet  Patient responded to Narcan from EMS  Urine drug screening was negative  · In addition to elevated transaminases, patient has elevated troponin, elevated CK and elevated LDH  · Transaminitis possibly in the setting of hypoperfusion vs  Drug use  · Will also obtain acute hepatitis panel, anti smooth muscle antibody, BEAR  · Aggressive fluid management per primary team    2  Drug overdose  · Patient reports taking the tablet of Percocet from a friend, ingested half and snorted the other half  · Urine drug screening was negative  · Acetaminophen level was negative  · Salicylate level was negative    Reason for Consult / Principal Problem:  Elevated LFTs    HPI:     Pete Michael is a 40y o  year old male with history of chronic lumbar back pain who presents with elevated LFTs after syncopal episode and accidental drug overdose  Patient states that he got a tablet of Percocet from a friend for chronic back pain  He ingested half of it and snorted the other half  Around an hour later he was getting out of the shower  He states that he does not have memory of the events afterward  Reportedly, his fiancee heard a loud sound from the room and found the patient unconscious with his face down on the bathroom floor  She reported shallow gurgling respirations and started CPR  Patient improved with Narcan administered from EMS  He is now awake, alert, and denies any pain or discomfort  Patient states that he drinks a few will beers/glasses a wine a week < 14 drinks/week  He denies tobacco use  Denies illicit drug use   He cannot remember if he took an Aleve earlier that morning but denies other medication  Patient does not have a PCP  He does not take any prescription medications  His mother has a history of alcoholic cirrhosis  His father has a history of prostate cancer  Last EGD: N/A  Last Colonoscopy: N/A    Review of Systems:    Review of Systems   Constitutional: Negative for chills and fever  Respiratory: Negative for shortness of breath  Cardiovascular: Negative for chest pain and palpitations  Gastrointestinal: Negative for abdominal pain, diarrhea, nausea and vomiting  Genitourinary: Negative for difficulty urinating  Musculoskeletal: Negative for myalgias  Skin: Negative for rash  Neurological: Negative for dizziness, light-headedness and headaches  Psychiatric/Behavioral: Negative for agitation         Historical Information   Past Medical History:   Diagnosis Date    Chronic back pain      Past Surgical History:   Procedure Laterality Date    WISDOM TOOTH EXTRACTION       Social History   Social History     Substance and Sexual Activity   Alcohol Use Yes    Alcohol/week: 2 0 standard drinks    Types: 1 Glasses of wine, 1 Cans of beer per week    Comment: 4 drinks/week     Social History     Substance and Sexual Activity   Drug Use Yes    Types: Oxycodone, Marijuana, Cocaine    Comment: snorted cocain about 10 years ago for 1 year     Social History     Tobacco Use   Smoking Status Current Some Day Smoker    Types: Cigars   Smokeless Tobacco Never Used   Tobacco Comment    has a cigar about 2/month     Family History   Problem Relation Age of Onset    Cirrhosis Mother     Cholelithiasis Mother     Diabetes type II Father         Meds/Allergies     Current Facility-Administered Medications   Medication Dose Route Frequency    acetaminophen (TYLENOL) tablet 650 mg  650 mg Oral Q6H PRN    ceftriaxone (ROCEPHIN) 1 g/50 mL in dextrose IVPB  1,000 mg Intravenous Q24H    sodium chloride 0 9 % infusion  125 mL/hr Intravenous Continuous  trimethobenzamide (TIGAN) IM injection 200 mg  200 mg Intramuscular Q6H PRN       No Known Allergies      Objective     Blood pressure 157/88, pulse 77, temperature 98 2 °F (36 8 °C), temperature source Oral, resp  rate 19, height 6' (1 829 m), weight 85 1 kg (187 lb 9 6 oz), SpO2 98 %  No intake or output data in the 24 hours ending 08/04/21 1059      PHYSICAL EXAM:      Physical Exam  Constitutional:       Appearance: Normal appearance  Cardiovascular:      Rate and Rhythm: Normal rate and regular rhythm  Pulmonary:      Effort: Pulmonary effort is normal       Breath sounds: Normal breath sounds  Abdominal:      General: Bowel sounds are normal       Palpations: Abdomen is soft  Tenderness: There is no abdominal tenderness  There is no guarding or rebound  Musculoskeletal:      Right lower leg: No edema  Left lower leg: No edema  Skin:     General: Skin is warm and dry  Neurological:      General: No focal deficit present  Mental Status: He is alert and oriented to person, place, and time  Psychiatric:         Mood and Affect: Mood normal          Behavior: Behavior normal            Lab Results:   Results from last 7 days   Lab Units 08/04/21  0505 08/02/21  2229   WBC Thousand/uL 6 96 12 30*   HEMOGLOBIN g/dL 14 0 15 4   HEMATOCRIT % 40 2 44 9   PLATELETS Thousands/uL 169 247   NEUTROS PCT %  --  88*   LYMPHS PCT %  --  7*   MONOS PCT %  --  4   EOS PCT %  --  0     Results from last 7 days   Lab Units 08/04/21  0505   POTASSIUM mmol/L 3 7   CHLORIDE mmol/L 106   CO2 mmol/L 27   BUN mg/dL 13   CREATININE mg/dL 1 04   CALCIUM mg/dL 8 6   ALK PHOS U/L 71   ALT U/L 1,181*   AST U/L 978*     Results from last 7 days   Lab Units 08/02/21  2229   INR  1 11           Imaging Studies: I have personally reviewed pertinent imaging studies  XR hand 3+ vw left    Result Date: 8/3/2021  Impression: No acute osseous abnormality   Workstation performed: [id number]     CT head without contrast    Result Date: 8/3/2021  Impression: No acute intracranial abnormality  Please see the separate report of the concurrent facial CT for evaluation of facial and orbital trauma  Workstation performed: [id number]     CT facial bones without contrast    Result Date: 8/3/2021  Impression: There is a fracture of the anterior wall of the left maxillary sinus with questionable fractures of the lateral and medial walls  Subcutaneous emphysema is noted subjacent to the anterior wall fracture  There is a fracture of the inferior orbital wall through the infraorbital foramen  The inferior rectus muscle abuts the wall however does not appear entrapped on this exam  Fluid, likely blood, and debris are seen within the left frontal and maxillary sinuses    The study was marked in EPIC for immediate notification  Workstation performed: [id number]     CT spine cervical without contrast    Result Date: 8/2/2021  Impression: No cervical spine fracture or traumatic malalignment  Workstation performed: [id number]     CT chest abdomen pelvis w contrast    Result Date: 8/2/2021  Impression: 7 x 4 mm nodule in the right lower lobe (2:34)  Based on current Fleischner Society 2017 Guidelines on incidental pulmonary nodule, followup non-contrast CT is recommended at 6-12 months from the initial examination and, if stable at that time, an additional followup is recommended for 18-24 months from the initial examination  Questionable calcification in the wall of the gallbladder raises the possibility of gallbladder adenomyomatosis  Recommend elective ultrasound for further evaluation  Otherwise unremarkable CT of the chest, abdomen and pelvis with IV without oral contrast  Workstation performed: [id number]       The patient was seen and examined by   [facility], all key medical decisions were made with   [facility]  Thank you for allowing us to participate in the care of this pleasant patient  We will follow up with you closely

## 2021-08-04 NOTE — ASSESSMENT & PLAN NOTE
Fall secondary to drug overdose  At this point, he does not complain of much pain besides his thumb  With his history of opiate use and what brought about his fall, we will avoid any opioids       - pain control with prn toradol IV 15mg for moderate pain and continue to monitor patient's creatinine levels if improving  Creatinine today was 1 0 improved

## 2021-08-05 VITALS
DIASTOLIC BLOOD PRESSURE: 71 MMHG | RESPIRATION RATE: 18 BRPM | BODY MASS INDEX: 25.41 KG/M2 | HEART RATE: 65 BPM | OXYGEN SATURATION: 98 % | HEIGHT: 72 IN | WEIGHT: 187.6 LBS | SYSTOLIC BLOOD PRESSURE: 130 MMHG | TEMPERATURE: 98.7 F

## 2021-08-05 PROBLEM — N17.9 ACUTE KIDNEY INJURY (HCC): Status: RESOLVED | Noted: 2021-08-03 | Resolved: 2021-08-05

## 2021-08-05 LAB
ACTIN IGG SERPL-ACNC: 3 UNITS (ref 0–19)
ALBUMIN SERPL BCP-MCNC: 3 G/DL (ref 3.5–5)
ALP SERPL-CCNC: 66 U/L (ref 46–116)
ALT SERPL W P-5'-P-CCNC: 942 U/L (ref 12–78)
ANION GAP SERPL CALCULATED.3IONS-SCNC: 6 MMOL/L (ref 4–13)
AST SERPL W P-5'-P-CCNC: 351 U/L (ref 5–45)
BILIRUB SERPL-MCNC: 0.24 MG/DL (ref 0.2–1)
BUN SERPL-MCNC: 11 MG/DL (ref 5–25)
CALCIUM ALBUM COR SERPL-MCNC: 8.9 MG/DL (ref 8.3–10.1)
CALCIUM SERPL-MCNC: 8.1 MG/DL (ref 8.3–10.1)
CHLORIDE SERPL-SCNC: 109 MMOL/L (ref 100–108)
CO2 SERPL-SCNC: 27 MMOL/L (ref 21–32)
CREAT SERPL-MCNC: 1 MG/DL (ref 0.6–1.3)
ERYTHROCYTE [DISTWIDTH] IN BLOOD BY AUTOMATED COUNT: 12.6 % (ref 11.6–15.1)
GFR SERPL CREATININE-BSD FRML MDRD: 90 ML/MIN/1.73SQ M
GLUCOSE SERPL-MCNC: 103 MG/DL (ref 65–140)
HCT VFR BLD AUTO: 38.4 % (ref 36.5–49.3)
HGB BLD-MCNC: 12.9 G/DL (ref 12–17)
INR PPP: 1.01 (ref 0.84–1.19)
MCH RBC QN AUTO: 31.5 PG (ref 26.8–34.3)
MCHC RBC AUTO-ENTMCNC: 33.6 G/DL (ref 31.4–37.4)
MCV RBC AUTO: 94 FL (ref 82–98)
PLATELET # BLD AUTO: 159 THOUSANDS/UL (ref 149–390)
PMV BLD AUTO: 10.2 FL (ref 8.9–12.7)
POTASSIUM SERPL-SCNC: 3.9 MMOL/L (ref 3.5–5.3)
PROT SERPL-MCNC: 6.1 G/DL (ref 6.4–8.2)
PROTHROMBIN TIME: 13.4 SECONDS (ref 11.6–14.5)
RBC # BLD AUTO: 4.09 MILLION/UL (ref 3.88–5.62)
SODIUM SERPL-SCNC: 142 MMOL/L (ref 136–145)
WBC # BLD AUTO: 4.48 THOUSAND/UL (ref 4.31–10.16)

## 2021-08-05 PROCEDURE — NC001 PR NO CHARGE: Performed by: INTERNAL MEDICINE

## 2021-08-05 PROCEDURE — 99239 HOSP IP/OBS DSCHRG MGMT >30: CPT

## 2021-08-05 PROCEDURE — 85027 COMPLETE CBC AUTOMATED: CPT

## 2021-08-05 PROCEDURE — 85610 PROTHROMBIN TIME: CPT | Performed by: PHYSICIAN ASSISTANT

## 2021-08-05 PROCEDURE — 80053 COMPREHEN METABOLIC PANEL: CPT

## 2021-08-05 RX ADMIN — SODIUM CHLORIDE 125 ML/HR: 0.9 INJECTION, SOLUTION INTRAVENOUS at 00:37

## 2021-08-05 RX ADMIN — SODIUM CHLORIDE 125 ML/HR: 0.9 INJECTION, SOLUTION INTRAVENOUS at 09:05

## 2021-08-05 RX ADMIN — CEFTRIAXONE 1000 MG: 1 INJECTION, POWDER, FOR SOLUTION INTRAMUSCULAR; INTRAVENOUS at 06:28

## 2021-08-05 NOTE — DISCHARGE INSTRUCTIONS
Adult Overdose   WHAT YOU NEED TO KNOW:   An overdose occurs when you take more medicine than is safe to take  An overdose may be mild, or it may be a life-threatening emergency  You may feel drowsy, dizzy, or nauseated, depending on what medicine you took  No specific harm was found to your body as a result of your overdose  Your symptoms have decreased over the last 6 to 12 hours  DISCHARGE INSTRUCTIONS:   Call 911 if you or someone close to you has any of the following symptoms:   · Your face is very pale and clammy to the touch  · Your body is limp or you are unable to speak  · You cannot be awakened  · Your breathing is slower or faster than usual      · Your heart is beating slower than usual     · You feel confused or more tired than usual, or you are sweating more than normal     · Your speech is slurred  · Your fingernails or lips are blue or purple  Return to the emergency department if:   · You have severe nausea and vomiting  · You cannot have a bowel movement or urinate  · Your skin and the whites of your eyes turn yellow  Contact your healthcare provider if:   · You think your medicine is not working  · You have nausea, vomiting, diarrhea, or abdominal cramps  · You have questions or concerns about your medicine  Take your medicine as directed:  Contact your healthcare provider if you think your medicine is not helping or if you have side effects  Do not take more medicine that is prescribed  Keep your medicines in the original containers  Keep a list of the medicines, vitamins, and herbs you take  Include the amounts, and when and why you take them  Do not share your medicine with others  Prevent another overdose:   · Read labels carefully  Read the labels of all the medicines that you take  Never take more than the label says to take  If you have questions, ask your pharmacist or healthcare provider  · Do not drink alcohol    Alcohol increases your risk for another overdose  Alcohol can also hide important symptoms that you need to call your healthcare provider for  · Do not drive or operate machinery  until your healthcare provider says it is okay  These activities may be dangerous after an overdose  · Use caution if you take more than one medicine at a time  Mixing medicines or taking more than one medicine at a time can be dangerous  · Tell your family or friends what medicines you are taking  Talk with them about what to do if you have an overdose  Follow up with your healthcare provider as directed: You may need to see a counselor or psychiatrist  Write down your questions so you remember to ask them during your visits  © Copyright [employer] 2021 Information is for End User's use only and may not be sold, redistributed or otherwise used for commercial purposes  All illustrations and images included in CareNotes® are the copyrighted property of [employer]  or Tremayne Shin  The above information is an  only  It is not intended as medical advice for individual conditions or treatments  Talk to your doctor, nurse or pharmacist before following any medical regimen to see if it is safe and effective for you
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
## 2021-08-05 NOTE — PLAN OF CARE
Problem: Prexisting or High Potential for Compromised Skin Integrity  Goal: Skin integrity is maintained or improved  Description: INTERVENTIONS:  - Identify patients at risk for skin breakdown  - Assess and monitor skin integrity  - Assess and monitor nutrition and hydration status  - Monitor labs   - Assess for incontinence   - Turn and reposition patient  - Assist with mobility/ambulation  - Relieve pressure over bony prominences  - Avoid friction and shearing  - Provide appropriate hygiene as needed including keeping skin clean and dry  - Evaluate need for skin moisturizer/barrier cream  - Collaborate with interdisciplinary team   - Patient/family teaching  - Consider wound care consult   Outcome: Progressing     Problem: PAIN - ADULT  Goal: Verbalizes/displays adequate comfort level or baseline comfort level  Description: Interventions:  - Encourage patient to monitor pain and request assistance  - Assess pain using appropriate pain scale  - Administer analgesics based on type and severity of pain and evaluate response  - Implement non-pharmacological measures as appropriate and evaluate response  - Consider cultural and social influences on pain and pain management  - Notify physician/advanced practitioner if interventions unsuccessful or patient reports new pain  Outcome: Progressing     Problem: INFECTION - ADULT  Goal: Absence or prevention of progression during hospitalization  Description: INTERVENTIONS:  - Assess and monitor for signs and symptoms of infection  - Monitor lab/diagnostic results  - Monitor all insertion sites, i e  indwelling lines, tubes, and drains  - Monitor endotracheal if appropriate and nasal secretions for changes in amount and color  - Maxwell appropriate cooling/warming therapies per order  - Administer medications as ordered  - Instruct and encourage patient and family to use good hand hygiene technique  - Identify and instruct in appropriate isolation precautions for identified infection/condition  Outcome: Progressing  Goal: Absence of fever/infection during neutropenic period  Description: INTERVENTIONS:  - Monitor WBC    Outcome: Progressing     Problem: SAFETY ADULT  Goal: Patient will remain free of falls  Description: INTERVENTIONS:  - Educate patient/family on patient safety including physical limitations  - Instruct patient to call for assistance with activity   - Consult OT/PT to assist with strengthening/mobility   - Keep Call bell within reach  - Keep bed low and locked with side rails adjusted as appropriate  - Keep care items and personal belongings within reach  - Initiate and maintain comfort rounds  - Make Fall Risk Sign visible to staff  - Offer Toileting every  Hours, in advance of need  - Initiate/Maintain alarm  - Obtain necessary fall risk management equipment:   - Apply yellow socks and bracelet for high fall risk patients  - Consider moving patient to room near nurses station  Outcome: Progressing  Goal: Maintain or return to baseline ADL function  Description: INTERVENTIONS:  -  Assess patient's ability to carry out ADLs; assess patient's baseline for ADL function and identify physical deficits which impact ability to perform ADLs (bathing, care of mouth/teeth, toileting, grooming, dressing, etc )  - Assess/evaluate cause of self-care deficits   - Assess range of motion  - Assess patient's mobility; develop plan if impaired  - Assess patient's need for assistive devices and provide as appropriate  - Encourage maximum independence but intervene and supervise when necessary  - Involve family in performance of ADLs  - Assess for home care needs following discharge   - Consider OT consult to assist with ADL evaluation and planning for discharge  - Provide patient education as appropriate  Outcome: Progressing  Goal: Maintains/Returns to pre admission functional level  Description: INTERVENTIONS:  - Perform BMAT or MOVE assessment daily    - Set and communicate daily mobility goal to care team and patient/family/caregiver  - Collaborate with rehabilitation services on mobility goals if consulted  - Perform Range of Motion  times a day  - Reposition patient every  hours  - Dangle patient  times a day  - Stand patient times a day  - Ambulate patient  times a day  - Out of bed to chair  times a day   - Out of bed for meals  times a day  - Out of bed for toileting  - Record patient progress and toleration of activity level   Outcome: Progressing     Problem: DISCHARGE PLANNING  Goal: Discharge to home or other facility with appropriate resources  Description: INTERVENTIONS:  - Identify barriers to discharge w/patient and caregiver  - Arrange for needed discharge resources and transportation as appropriate  - Identify discharge learning needs (meds, wound care, etc )  - Arrange for interpretive services to assist at discharge as needed  - Refer to Case Management Department for coordinating discharge planning if the patient needs post-hospital services based on physician/advanced practitioner order or complex needs related to functional status, cognitive ability, or social support system  Outcome: Progressing     Problem: Knowledge Deficit  Goal: Patient/family/caregiver demonstrates understanding of disease process, treatment plan, medications, and discharge instructions  Description: Complete learning assessment and assess knowledge base    Interventions:  - Provide teaching at level of understanding  - Provide teaching via preferred learning methods  Outcome: Progressing

## 2021-08-05 NOTE — UTILIZATION REVIEW
Initial Clinical Review    Admission: Date/Time/Statement:   Admission Orders (From admission, onward)     Ordered        08/03/21 0105  INPATIENT ADMISSION  Once                   Orders Placed This Encounter   Procedures    INPATIENT ADMISSION     Standing Status:   Standing     Number of Occurrences:   1     Order Specific Question:   Level of Care     Answer:   Med Surg [16]     Order Specific Question:   Estimated length of stay     Answer:   More than 2 Midnights     Order Specific Question:   Certification     Answer:   I certify that inpatient services are medically necessary for this patient for a duration of greater than two midnights  See H&P and MD Progress Notes for additional information about the patient's course of treatment  ED Arrival Information     Expected Arrival Acuity    - 8/2/2021 21:44 Urgent         Means of arrival Escorted by Service Admission type    Ambulance [id number] [facility]         Arrival complaint    [facility]        Chief Complaint   Patient presents with    Overdose - Accidental     Pt arrives via EMS, states he snorted a Perc 30 and was in the shower and passed out  States he believes he passed out and then hit his head on tub  As per EMS, pt's GF heard a crash and found the pt unconscious in a heap in the tub  Abrasion, ecchymosis and swelling noted to L side of forehead, L lower orbital area, and pain in L hand  Arrives with c-collar inplace, denies neck or back pain  As per EMS pt was given 8mg intranasal narcan before arousing  Initial Presentation: 39 yo male PMH of chronic low back pain to ED by EMS presents w fall found unresponsive, face down  in shower by girlfriend  Significant other rolled him over noting shallow gurgling respirations, summoned EMS & started CPR  Notes patient did not stop breathing  Upon EMS arrival patient noted w nose bleeding from kilo nares mostly on left  Given 8 mg  intranasal narcan  Patient awoke & alert  In ED patient reporting nausea, mild abd pain w emesis from nose bleed  Pressure applied to nostril  Reports drinks alcohol (a beer/1 glass of wine 4 time/week) w/ HX of binge drinking more than 10 years; reports obtained this current Percocet from a friend/dealer  Snorted Perc  & took oral Percocet in shower & passed out  In ED consult TRAUMA  Per Trauma GCS=15, intact airway, kilo breath sounds  HEAD: Swelling over the left inferior orbit with overlying ecchymosis, EKG without any ST T-wave changes  Left frontal hematoma  Slow dripping of blood from the left nare  R nare with dried blood  Slight swelling over the nasal bridge without deviation  CT facial bones reveals fracture of the anterior wall of the left maxillary sinus & closed FX of left Orbital floor   Clear from image review & consult Internal medicine  Admit Inpatient due to accidental drug OD, CHAGO, elevated Transaminase, Leukocytosis, elevated TROP, Closed FX or left orbital floor, closed FX of maxillary sinus, fall  Cont IVF w close monitoring of renal & LFTs, avoid hypotension/nephrotoxins  ECHO, consult OMFS, Cardiology, GI   Urine tox screen, UDS, IV antibx, monitor fever, pend pro calcitonin, telemetry, multinodal pain regimen  8/3 OMFS  Passed out in BR after taking Percocet; complaining of facial pain, no pain in mouth or eyes or trouble moving eyes; FX are non operative; no surgical intervention or follow up   8/3 Trauma: May be DC after completion of OMS eval & formal recs  8/3/2021 Cardiology ECGs have been unremarkable but troponins  elevated-so far peaking at 0 84   endorses no cardiac symptoms at all  Non MI troponin elevation in setting of possibly hypotension from Percocet overdose as well as increased stress from fractures and decreased clearance from acute kidney injury  Obtain ECHO obtain lipid profile & A1c     Date: 8/4   Day 2:   Internal Medicine:  Minimal discomfort in left facial region, pain improved in left wrist, abn LFTs & worsening transaminitis-obtain RUQ ultrasound; per GI likely ischemic hepatitis 2ndary to poss hypotensive episode w syncope  Avoid hypotension, hepatotoxic meds     GI  Elevated LFTs cont monitor q12, PT, INR; no evidence of encephalopathy; if ischemic hepatitis should peak soon w rapid improvement  Cardiology  No SOB at rest or exertion non MI elevation in setting of accidental OD  ECHO pending    8/5/2021   GI transaminase improved PT/INR normal, OP LFTs  Internal med: stable for DC   ED Triage Vitals   Temperature Pulse Respirations Blood Pressure SpO2   08/02/21 2145 08/02/21 2145 08/02/21 2145 08/02/21 2145 08/02/21 2145   98 1 °F (36 7 °C) 94 16 148/78 98 %      Temp Source Heart Rate Source Patient Position - Orthostatic VS BP Location FiO2 (%)   08/02/21 2145 08/02/21 2145 08/03/21 0300 08/03/21 0300 --   Oral Monitor Lying Right arm       Pain Score       08/03/21 0138       No Pain          Wt Readings from Last 1 Encounters:   08/04/21 85 1 kg (187 lb 9 6 oz)     Additional Vital Signs:   Date/Time  Temp  Pulse  Resp  BP  MAP (mmHg)  SpO2  O2 Device  Patient Position - Orthostatic VS   08/05/21 0626  98 7 °F (37 1 °C)  65  18  130/71  --  --  --  Lying   08/04/21 2203  98 3 °F (36 8 °C)  63  18  154/91  --  98 %  None (Room air)  Lying   08/04/21 1510  98 1 °F (36 7 °C)  77  18  140/80  --  98 %  None (Room air)  Sitting   08/04/21 0859  98 2 °F (36 8 °C)  77  19  157/88  --  98 %  --  Lying   08/04/21 0839  98 4 °F (36 9 °C)  68  16  130/73  --  97 %  None (Room air)  --   08/04/21 0505  --  78  18  131/79  101  97 %  None (Room air)  Lying   08/04/21 0300  --  62  16  126/61  86  94 %  None (Room air)  Lying   08/04/21 0100  --  64  16  110/58  77  96 %  None (Room air)  Lying   08/04/21 0000  --  64  16  104/51  72  95 %  None (Room air)  Lying   08/03/21 2300  --  66  18  124/69  87  95 %  None (Room air)  Lying   08/03/21 2100  --  78  --  --  --  96 %  --  --   08/03/21 2045 --  76  --  --  --  98 %  --  --   08/03/21 2030  --  74  --  --  --  98 %  --  --   08/03/21 2015  --  76  --  --  --  97 %  --  --   08/03/21 2000  --  --  --  129/74  94  --  --  --   08/03/21 1950  --  86  18  138/85  106  97 %  None (Room air)  Lying   08/03/21 1637  98 6 °F (37 °C)  81  18  147/82  --  98 %  None (Room air)  Lying   08/03/21 1615  --  86  --  --  --  98 %  --  --   08/03/21 1600  --  74  --  134/74  98  98 %  --  --   08/03/21 1545  --  86  --  --  --  98 %  --  --   08/03/21 1415  --  78  --  --  --  99 %  --  --   08/03/21 1400  --  74  --  133/80  103  98 %  --  --   08/03/21 1300  --  73  16  125/69  74  99 %  None (Room air)  Lying   08/03/21 1200  --  70  --  117/61  84  98 %  --  --   08/03/21 1145  --  70  --  --  --  97 %  --  --   08/03/21 1100  --  80  --  118/64  85  98 %  --  --   08/03/21 1045  --  86  --  --  --  96 %  --  --   08/03/21 0900  --  94  18  117/70  87  98 %  None (Room air)  Lying   08/03/21 0718  --  85  16  104/65  80  98 %  None (Room air)  Lying   08/03/21 0700  --  72  18  110/66  82  96 %  None (Room air)  Lying   08/03/21 0400  --  82  16  110/66  81  97 %  None (Room air)  Lying   08/03/21 0300  --  88  16  118/70  87  96 %  None (Room air)  Lying   08/03/21 0237  --  82  --  --  --  --  --  --   08/03/21 0100  --  82  18  142/71  --  96 %  --  --   08/03/21 0050  --  82  --  --  --  94 %  --  --   08/03/21 0045  --  84  --  --  --  96 %  --  --   08/03/21 00:44:49  --  --  --  149/70  --  --  --  --   08/03/21 0040  --  88  --  --  --  96 %  --  --   08/03/21 0035  --  86  --  --  --  93 %  --  --   08/03/21 0030  --  84  --  --  --  96 %  --  --   08/03/21 00:29:49  --  --  --  138/84  --  --  --  --   08/03/21 0025  --  86  --  --  --  96 %  --  --   08/03/21 0020  -- 88  --  --  --  93 %  --  --   08/03/21 0015  -- 88  --  --  --  93 %  --  --   08/03/21 00:14:49  --  --  --  135/81  --  --  --  --   08/03/21 0010  --  90  --  --  --  93 %  --  -- 08/03/21 0005  --  92  --  --  --  93 %  --  --   08/03/21 0000  --  90  18  144/87  --  94 %  --  --      Weights (last 14 days) before discharge    Date/Time  Weight  Weight Method  Height   08/04/21 0900  85 1 kg (187 lb 9 6 oz)  Standing scale  6' (1 829 m)   08/03/21 0300  93 2 kg (205 lb 7 5 oz)  Stretcher scale  6' (1 829 m)       Pertinent Labs/Diagnostic Test Results:       Results from last 7 days   Lab Units 08/05/21  0552 08/04/21  0505 08/03/21  0510 08/02/21  2229   WBC Thousand/uL 4 48 6 96 17 29* 12 30*   HEMOGLOBIN g/dL 12 9 14 0 14 0 15 4   HEMATOCRIT % 38 4 40 2 41 4 44 9   PLATELETS Thousands/uL 159 169 221 247   NEUTROS ABS Thousands/µL  --   --   --  10 82*         Results from last 7 days   Lab Units 08/05/21  0552 08/04/21  0505 08/03/21  0510 08/02/21  2229   SODIUM mmol/L 142 141 140 144   POTASSIUM mmol/L 3 9 3 7 3 8 3 7   CHLORIDE mmol/L 109* 106 102 102   CO2 mmol/L 27 27 27 21   ANION GAP mmol/L 6 8 11 21*   BUN mg/dL 11 13 19 16   CREATININE mg/dL 1 00 1 04 1 50* 1 77*   EGFR ml/min/1 73sq m 90 86 55 45   CALCIUM mg/dL 8 1* 8 6 8 6 8 7     Results from last 7 days   Lab Units 08/05/21  0552 08/04/21  1535 08/04/21  0505 08/03/21  0510 08/02/21  2229   AST U/L 351* 862* 978* 346* 160*   ALT U/L 942* 1,291* 1,181* 395* 176*   ALK PHOS U/L 66 74 71 81 86   TOTAL PROTEIN g/dL 6 1* 6 6 6 6 7 5 8 0   ALBUMIN g/dL 3 0* 3 6 3 7 4 2 4 6   TOTAL BILIRUBIN mg/dL 0 24 0 42 0 56 0 37 0 70   BILIRUBIN DIRECT mg/dL  --  0 12  --   --   --          Results from last 7 days   Lab Units 08/05/21  0552 08/04/21  0505 08/03/21  0510 08/02/21  2229   GLUCOSE RANDOM mg/dL 103 96 135 122         Results from last 7 days   Lab Units 08/04/21  0505   HEMOGLOBIN A1C % 5 0   EAG mg/dl 97     No results found for: BETA-HYDROXYBUTYRATE               Results from last 7 days   Lab Units 08/04/21  0916   CK TOTAL U/L 3,208*   CK MB INDEX % 1 5   CK MB ng/mL 47 2*     Results from last 7 days   Lab Units 08/03/21  0325 08/03/21  0135 08/02/21  2229   TROPONIN I ng/mL 0 84* 0 72* 0 18*         Results from last 7 days   Lab Units 08/05/21  0552 08/04/21  1105 08/02/21  2229   PROTIME seconds 13 4 13 7 14 4   INR  1 01 1 04 1 11         Results from last 7 days   Lab Units 08/04/21  0505 08/03/21  0740   PROCALCITONIN ng/ml 7 30* 6 93*                         Results from last 7 days   Lab Units 08/04/21  1104   HEP B S AG  Non-reactive   HEP C AB  Non-reactive   HEP B C IGM  Non-reactive                             Results from last 7 days   Lab Units 08/03/21  0230   AMPH/METH  Negative   BARBITURATE UR  Negative   BENZODIAZEPINE UR  Negative   COCAINE UR  Negative   METHADONE URINE  Negative   OPIATE UR  Negative   PCP UR  Negative   THC UR  Negative     Results from last 7 days   Lab Units 08/03/21  0030   ACETAMINOPHEN LVL ug/mL <2*   SALICYLATE LVL mg/dL <3*     XR chest pa & lateral   Final Result by , MD (08/04 1647)      No acute cardiopulmonary disease  US right upper quadrant   Final Result by  MD (08/04 1341)      Within normal limits  XR hand 3+ vw left   Final Result by , MD (08/03 1349)      No acute osseous abnormality  CT head without contrast   Final Result by  MD (08/03 0001)      No acute intracranial abnormality  Please see the separate report of the concurrent facial CT for evaluation of facial and orbital trauma  CT spine cervical without contrast   Final Result by  MD (08/02 2310)      No cervical spine fracture or traumatic malalignment  CT chest abdomen pelvis w contrast   Final Result by , MD (08/02 5787)      7 x 4 mm nodule in the right lower lobe (2:34)  Based on current Fleischner Society 2017 Guidelines on incidental pulmonary nodule, followup non-contrast CT is recommended at 6-12 months from the initial examination and, if stable at that time, an    additional followup is recommended for 18-24 months from the initial examination        Questionable calcification in the wall of the gallbladder raises the possibility of gallbladder adenomyomatosis  Recommend elective ultrasound for further evaluation  Otherwise unremarkable CT of the chest, abdomen and pelvis with IV without oral contrast     CT facial bones without contrast   Final Result by  MD (08/03 0001)      There is a fracture of the anterior wall of the left maxillary sinus with questionable fractures of the lateral and medial walls  Subcutaneous emphysema is noted subjacent to the anterior wall fracture  There is a fracture of the inferior orbital wall through the infraorbital foramen  The inferior rectus muscle abuts the wall however does not appear entrapped on this exam       Fluid, likely blood, and debris are seen within the left frontal and maxillary sinuses        8/4  EKG without any ST T-wave changes  8/4 ECHO LEFT VENTRICLE:  Systolic function was normal  Ejection fraction was estimated to be 60 %    ED Treatment:   Medication Administration from 08/02/2021 2144 to 08/04/2021 0855       Date/Time Order Dose Route Action     08/02/2021 2300 oxymetazoline (AFRIN) 0 05 % nasal spray 2 spray 2 spray Each Nare Given     08/02/2021 2221 ondansetron (ZOFRAN) injection 4 mg 4 mg Intravenous Given     08/03/2021 0135 ondansetron (ZOFRAN) injection 4 mg 4 mg Intravenous Given     08/03/2021 0205 tetanus-diphtheria-acellular pertussis (BOOSTRIX) IM injection 0 5 mL 0 5 mL Intramuscular Given     08/03/2021 0325 sodium chloride 0 9 % infusion 100 mL/hr Intravenous New Bag     08/04/2021 0520 ceftriaxone (ROCEPHIN) 1 g/50 mL in dextrose IVPB 1,000 mg Intravenous New Bag     08/03/2021 0710 ceftriaxone (ROCEPHIN) 1 g/50 mL in dextrose IVPB 1,000 mg Intravenous New Bag     08/03/2021 0900 metroNIDAZOLE (FLAGYL) IVPB (premix) 500 mg 100 mL 500 mg Intravenous New Bag        Past Medical History:   Diagnosis Date    Chronic back pain      Present on Admission:  **None**      Admitting Diagnosis: Overdose [T50 901A]  Bleeding from the nose [R04 0]  Pulmonary nodule [R91 1]  Elevated troponin [R77 8]  Troponin level elevated [R77 8]  Elevated transaminase level [R74 01]  Closed fracture of orbit, initial encounter ([facility] [address] ) [S02 85XA]  Accidental overdose, initial encounter [T50 901A]  Calcification of gallbladder [K82 8]  Closed fracture of maxillary sinus, initial encounter ([facility] ) [W57 318H]  Age/Sex: 40 y o  male  Admission Orders:  Telemetry  Echo  scd  Scheduled Medications:     Continuous IV Infusions:  sodium chloride, 125 mL/hr, Intravenous, Continuous    PRN Meds:  acetaminophen, 650 mg, Oral, Q6H PRN  trimethobenzamide, 200 mg, Intramuscular, Q6H PRN    IP CONSULT TO ORAL AND MAXILLOFACIAL SURGERY  IP CONSULT TO TRAUMA SURGERY  IP CONSULT TO CARDIOLOGY  IP CONSULT TO GASTROENTEROLOGY  Network Utilization Review Department  ATTENTION: Please call with any questions or concerns to [phone] and carefully listen to the prompts so that you are directed to the right person  All voicemails are confidential   Chapin Liborio all requests for admission clinical reviews, approved or denied determinations and any other requests to dedicated fax number below belonging to the campus where the patient is receiving treatment   List of dedicated fax numbers for the Facilities:  [address] [place] [address] DENIALS (Administrative/Medical Necessity) [phone]   [address] (Maternity/NICU/Pediatrics) [phone]   [address] [id number] [facility] [id number] [facility] [address] [place] [address] [phone]

## 2021-08-05 NOTE — PLAN OF CARE
Problem: Prexisting or High Potential for Compromised Skin Integrity  Goal: Skin integrity is maintained or improved  Description: INTERVENTIONS:  - Identify patients at risk for skin breakdown  - Assess and monitor skin integrity  - Assess and monitor nutrition and hydration status  - Monitor labs   - Assess for incontinence   - Turn and reposition patient  - Assist with mobility/ambulation  - Relieve pressure over bony prominences  - Avoid friction and shearing  - Provide appropriate hygiene as needed including keeping skin clean and dry  - Evaluate need for skin moisturizer/barrier cream  - Collaborate with interdisciplinary team   - Patient/family teaching  - Consider wound care consult   Outcome: Completed     Problem: PAIN - ADULT  Goal: Verbalizes/displays adequate comfort level or baseline comfort level  Description: Interventions:  - Encourage patient to monitor pain and request assistance  - Assess pain using appropriate pain scale  - Administer analgesics based on type and severity of pain and evaluate response  - Implement non-pharmacological measures as appropriate and evaluate response  - Consider cultural and social influences on pain and pain management  - Notify physician/advanced practitioner if interventions unsuccessful or patient reports new pain  Outcome: Completed     Problem: SAFETY ADULT  Goal: Patient will remain free of falls  Description: INTERVENTIONS:  - Educate patient/family on patient safety including physical limitations  - Instruct patient to call for assistance with activity   - Consult OT/PT to assist with strengthening/mobility   - Keep Call bell within reach  - Keep bed low and locked with side rails adjusted as appropriate  - Keep care items and personal belongings within reach  - Initiate and maintain comfort rounds  - Make Fall Risk Sign visible to staff  - Offer Toileting every Hours, in advance of need  - Initiate/Maintain alarm  - Obtain necessary fall risk management equipment:   - Apply yellow socks and bracelet for high fall risk patients  - Consider moving patient to room near nurses station  Outcome: Completed  Goal: Maintain or return to baseline ADL function  Description: INTERVENTIONS:  -  Assess patient's ability to carry out ADLs; assess patient's baseline for ADL function and identify physical deficits which impact ability to perform ADLs (bathing, care of mouth/teeth, toileting, grooming, dressing, etc )  - Assess/evaluate cause of self-care deficits   - Assess range of motion  - Assess patient's mobility; develop plan if impaired  - Assess patient's need for assistive devices and provide as appropriate  - Encourage maximum independence but intervene and supervise when necessary  - Involve family in performance of ADLs  - Assess for home care needs following discharge   - Consider OT consult to assist with ADL evaluation and planning for discharge  - Provide patient education as appropriate  Outcome: Completed  Goal: Maintains/Returns to pre admission functional level  Description: INTERVENTIONS:  - Perform BMAT or MOVE assessment daily    - Set and communicate daily mobility goal to care team and patient/family/caregiver  - Collaborate with rehabilitation services on mobility goals if consulted  - Perform Range of Motion  times a day  - Reposition patient every  hours    - Dangle patient  times a day  - Stand patient  times a day  - Ambulate patient  times a day  - Out of bed to chair  times a day   - Out of bed for meals  times a day  - Out of bed for toileting  - Record patient progress and toleration of activity level   Outcome: Completed     Problem: DISCHARGE PLANNING  Goal: Discharge to home or other facility with appropriate resources  Description: INTERVENTIONS:  - Identify barriers to discharge w/patient and caregiver  - Arrange for needed discharge resources and transportation as appropriate  - Identify discharge learning needs (meds, wound care, etc )  - Arrange for interpretive services to assist at discharge as needed  - Refer to Case Management Department for coordinating discharge planning if the patient needs post-hospital services based on physician/advanced practitioner order or complex needs related to functional status, cognitive ability, or social support system  Outcome: Completed     Problem: Knowledge Deficit  Goal: Patient/family/caregiver demonstrates understanding of disease process, treatment plan, medications, and discharge instructions  Description: Complete learning assessment and assess knowledge base    Interventions:  - Provide teaching at level of understanding  - Provide teaching via preferred learning methods  Outcome: Completed

## 2021-08-05 NOTE — PROGRESS NOTES
Assessment/Plan:   1  Elevated LFTs  ? Patient presents to the hospital after being found unconscious on the floor of his bathroom  Patient had ingested/states snorted a tablet of what he believed was Percocet  Patient responded to Narcan from EMS  Urine drug screening was negative  ? In addition to elevated transaminases on presentation, patient had elevated troponin, elevated CK, CHAGO  and elevated LDH  ? Transaminitis possibly in the setting of hypoperfusion vs  Drug use  ? Obtained:  ?  acute hepatitis panel: normal  ? anti smooth muscle antibody: pending  ? BEAR: pending  ? Aggressive fluid management per primary team     2  Drug overdose  ? Patient reports taking the tablet of Percocet from a friend, ingested half and snorted the other half  ? Urine drug screening was negative  ? Acetaminophen level was negative  ? Salicylate level was negative    Plan:  · Transaminases improved from yesterday:  --> 351, ALT 1291 --> 942  · PT/INR normal  · Should follow-up LFTs as an outpatient      Subjective:    Patient feels back at baseline today  He denies any pain or discomfort  He denies any nausea or vomiting  He is tolerating p o  intake well  ROS: As noted in the HPI, otherwise all others negative  Objective:     Vitals: Blood pressure 130/71, pulse 65, temperature 98 7 °F (37 1 °C), temperature source Oral, resp  rate 18, height 6' (1 829 m), weight 85 1 kg (187 lb 9 6 oz), SpO2 98 %  ,Body mass index is 25 44 kg/m²  Intake/Output Summary (Last 24 hours) at 8/5/2021 0851  Last data filed at 8/5/2021 0700  Gross per 24 hour   Intake 4318 33 ml   Output 1400 ml   Net 2918 33 ml       Physical Exam:     Physical Exam  Constitutional:       Appearance: Normal appearance  Cardiovascular:      Rate and Rhythm: Normal rate and regular rhythm  Pulmonary:      Effort: Pulmonary effort is normal       Breath sounds: Normal breath sounds     Abdominal:      General: Bowel sounds are normal  Palpations: Abdomen is soft  Tenderness: There is no abdominal tenderness  There is no guarding or rebound  Musculoskeletal:      Right lower leg: No edema  Left lower leg: No edema  Skin:     General: Skin is warm and dry  Neurological:      General: No focal deficit present  Mental Status: He is alert and oriented to person, place, and time  Psychiatric:         Mood and Affect: Mood normal          Behavior: Behavior normal           Invasive Devices     Peripheral Intravenous Line            Peripheral IV 08/03/21 Left; Lower Forearm 2 days                Lab Results:  Results from last 7 days   Lab Units 08/05/21  0552 08/02/21  2229   WBC Thousand/uL 4 48 12 30*   HEMOGLOBIN g/dL 12 9 15 4   HEMATOCRIT % 38 4 44 9   PLATELETS Thousands/uL 159 247   NEUTROS PCT %  --  88*   LYMPHS PCT %  --  7*   MONOS PCT %  --  4   EOS PCT %  --  0     Results from last 7 days   Lab Units 08/05/21  0552   POTASSIUM mmol/L 3 9   CHLORIDE mmol/L 109*   CO2 mmol/L 27   BUN mg/dL 11   CREATININE mg/dL 1 00   CALCIUM mg/dL 8 1*   ALK PHOS U/L 66   ALT U/L 942*   AST U/L 351*     Results from last 7 days   Lab Units 08/05/21  0552   INR  1 01           Imaging Studies: I have personally reviewed pertinent imaging studies  XR chest pa & lateral    Result Date: 8/4/2021  Impression: No acute cardiopulmonary disease  Workstation performed: [id number]     XR hand 3+ vw left    Result Date: 8/3/2021  Impression: No acute osseous abnormality  Workstation performed: [id number]     CT head without contrast    Result Date: 8/3/2021  Impression: No acute intracranial abnormality  Please see the separate report of the concurrent facial CT for evaluation of facial and orbital trauma   Workstation performed: [id number]     CT facial bones without contrast    Result Date: 8/3/2021  Impression: There is a fracture of the anterior wall of the left maxillary sinus with questionable fractures of the lateral and medial walls  Subcutaneous emphysema is noted subjacent to the anterior wall fracture  There is a fracture of the inferior orbital wall through the infraorbital foramen  The inferior rectus muscle abuts the wall however does not appear entrapped on this exam  Fluid, likely blood, and debris are seen within the left frontal and maxillary sinuses    The study was marked in EPIC for immediate notification  Workstation performed: [id number]     CT spine cervical without contrast    Result Date: 8/2/2021  Impression: No cervical spine fracture or traumatic malalignment  Workstation performed: [id number]     CT chest abdomen pelvis w contrast    Result Date: 8/2/2021  Impression: 7 x 4 mm nodule in the right lower lobe (2:34)  Based on current Fleischner Society 2017 Guidelines on incidental pulmonary nodule, followup non-contrast CT is recommended at 6-12 months from the initial examination and, if stable at that time, an additional followup is recommended for 18-24 months from the initial examination  Questionable calcification in the wall of the gallbladder raises the possibility of gallbladder adenomyomatosis  Recommend elective ultrasound for further evaluation  Otherwise unremarkable CT of the chest, abdomen and pelvis with IV without oral contrast  Workstation performed: [id number]     US right upper quadrant    Result Date: 8/4/2021  Impression: Within normal limits   Workstation performed: [id number]

## 2021-08-05 NOTE — DISCHARGE INSTR - AVS FIRST PAGE
Dear Laura Daly,     It was our pleasure to care for you here at [facility]  It is our hope that we were always able to exceed the expected standards for your care during your stay  You were hospitalized due to accidental drug overdose  You were cared for on the 3rd floor by Sumit Blanchard MD under the service of Cherie Menjivar MD with the [facility] who covers for your primary care physician (PCP), No primary care provider on file  , while you were hospitalized  If you have any questions or concerns related to this hospitalization, you may contact us at [phone]  For follow up as well as any medication refills, we recommend that you follow up with your primary care physician  A registered nurse will reach out to you by phone within a few days after your discharge to answer any additional questions that you may have after going home  However, at this time we provide for you here, the most important instructions / recommendations at discharge:     · Notable Medication Adjustments -   · None  · Testing Required after Discharge -   · Liver function test   · Important follow up information -   · Please follow up with the clinic provided to have your liver function labs done and monitored to ensure they continue to go down  · Please follow up with the clinic provided to monitor the nodule found in your right lower lobe  · Please review this entire after visit summary as additional general instructions including medication list, appointments, activity, diet, any pertinent wound care, and other additional recommendations from your care team that may be provided for you        Sincerely,     Sumit Blanchard MD

## 2021-08-06 LAB — RYE IGE QN: NEGATIVE

## 2021-12-28 ENCOUNTER — HOSPITAL ENCOUNTER (EMERGENCY)
Facility: HOSPITAL | Age: 46
Discharge: HOME | End: 2021-12-28
Attending: EMERGENCY MEDICINE

## 2021-12-28 VITALS
SYSTOLIC BLOOD PRESSURE: 150 MMHG | HEART RATE: 107 BPM | OXYGEN SATURATION: 96 % | RESPIRATION RATE: 12 BRPM | DIASTOLIC BLOOD PRESSURE: 96 MMHG | TEMPERATURE: 99 F

## 2021-12-28 DIAGNOSIS — T40.601A OPIATE OVERDOSE (HCC): Primary | ICD-10-CM

## 2021-12-28 PROCEDURE — 99284 EMERGENCY DEPT VISIT MOD MDM: CPT

## 2021-12-28 PROCEDURE — 93005 ELECTROCARDIOGRAM TRACING: CPT

## 2021-12-28 PROCEDURE — 99284 EMERGENCY DEPT VISIT MOD MDM: CPT | Performed by: EMERGENCY MEDICINE

## 2021-12-29 LAB
ATRIAL RATE: 102 BPM
P AXIS: 63 DEGREES
PR INTERVAL: 157 MS
QRS AXIS: 56 DEGREES
QRSD INTERVAL: 98 MS
QT INTERVAL: 336 MS
QTC INTERVAL: 438 MS
T WAVE AXIS: 40 DEGREES
VENTRICULAR RATE: 102 BPM

## 2021-12-29 PROCEDURE — 93010 ELECTROCARDIOGRAM REPORT: CPT | Performed by: INTERNAL MEDICINE

## 2021-12-29 NOTE — ED NOTES
Patient was called by [facility] and was undecided about rehab  Orestes Cortes from [facility] took patient's phone number and advised she would call him at home tomorrow  Spoke with patient who admitted he needed help but wasn't ready to go anywhere tonight  He reported he wanted to go home and talk to his fiance first   He advised that [facility] was going to call him tomorrow and they would talk about his options

## 2021-12-29 NOTE — ED NOTES
Patient advised that he wanted to go to rehab  Called [facility] and spoke with Gia English  Patient does not have phone here so nurse station phone number provided  Gia English stated that she would call him right away

## 2021-12-29 NOTE — ED PROVIDER NOTES
History  Chief Complaint   Patient presents with    Overdose - Accidental     pt snorted fentanyl  cpr done by police  no pulse  2 narcans given with rosc   15-20min after remains with tachycardia     Patient is a 40 year male  He snorted bed no prior to arriving became unresponsive  Bystanders it seen felt the was pulseless and started CPR  He received Narcan prior to arrival in did wake up  It appears that he had atrial fibrillation with rapid ventricle response on the monitor initially  Bedtime arrived in the emergency room he had converted into normal sinus rhythm  Currently patient reports that he feels like shit  Mostly feels as if he is having withdrawal   No chest pain or shortness of breath  Patient declines drug and alcohol treatment  This was accidental   Denies other drugs or alcohol  None       Past Medical History:   Diagnosis Date    Chronic back pain        Past Surgical History:   Procedure Laterality Date    WISDOM TOOTH EXTRACTION         Family History   Problem Relation Age of Onset    Cirrhosis Mother     Cholelithiasis Mother     Diabetes type II Father      I have reviewed and agree with the history as documented  E-Cigarette/Vaping     E-Cigarette/Vaping Substances     Social History     Tobacco Use    Smoking status: Current Some Day Smoker     Types: Cigars    Smokeless tobacco: Never Used    Tobacco comment: has a cigar about 2/month   Substance Use Topics    Alcohol use: Yes     Alcohol/week: 2 0 standard drinks     Types: 1 Glasses of wine, 1 Cans of beer per week     Comment: 4 drinks/week    Drug use: Yes     Types: Oxycodone, Marijuana, Cocaine     Comment: snorted cocain about 10 years ago for 1 year       Review of Systems   Constitutional: Negative for chills and fever  HENT: Negative for rhinorrhea and sore throat  Eyes: Negative for pain, redness and visual disturbance  Respiratory: Negative for cough and shortness of breath  Cardiovascular: Negative for chest pain and leg swelling  Gastrointestinal: Negative for abdominal pain, diarrhea and vomiting  Endocrine: Negative for polydipsia and polyuria  Genitourinary: Negative for dysuria, frequency and hematuria  Musculoskeletal: Negative for back pain and neck pain  Skin: Negative for rash and wound  Allergic/Immunologic: Negative for immunocompromised state  Neurological: Negative for weakness, numbness and headaches  Psychiatric/Behavioral: Negative for hallucinations and suicidal ideas  All other systems reviewed and are negative  Physical Exam  Physical Exam  Vitals reviewed  Constitutional:       General: He is not in acute distress  Appearance: He is not toxic-appearing  HENT:      Head: Normocephalic and atraumatic  Nose: Nose normal       Mouth/Throat:      Mouth: Mucous membranes are moist    Eyes:      General:         Right eye: No discharge  Left eye: No discharge  Conjunctiva/sclera: Conjunctivae normal    Cardiovascular:      Rate and Rhythm: Regular rhythm  Tachycardia present  Pulses: Normal pulses  Heart sounds: Normal heart sounds  No murmur heard  No friction rub  No gallop  Pulmonary:      Effort: Pulmonary effort is normal  No respiratory distress  Breath sounds: Normal breath sounds  No stridor  No wheezing, rhonchi or rales  Comments: There is mild anterior chest wall tenderness  No crepitus  Chest:      Chest wall: Tenderness present  Abdominal:      General: Bowel sounds are normal  There is no distension  Palpations: Abdomen is soft  Tenderness: There is no abdominal tenderness  There is no right CVA tenderness, left CVA tenderness, guarding or rebound  Musculoskeletal:         General: No swelling, tenderness, deformity or signs of injury  Normal range of motion  Cervical back: Normal range of motion and neck supple  No rigidity  Right lower leg: No edema  Left lower leg: No edema  Comments: No calf pain or unilateral leg swelling   Skin:     General: Skin is warm and dry  Coloration: Skin is not jaundiced or pale  Findings: No bruising, erythema or rash  Neurological:      General: No focal deficit present  Mental Status: He is alert and oriented to person, place, and time  Cranial Nerves: No facial asymmetry  Sensory: No sensory deficit  Motor: Motor function is intact  Psychiatric:         Mood and Affect: Mood normal          Behavior: Behavior normal          Vital Signs  ED Triage Vitals [12/28/21 1851]   Temperature Pulse Respirations Blood Pressure SpO2   99 °F (37 2 °C) 103 12 151/92 98 %      Temp Source Heart Rate Source Patient Position - Orthostatic VS BP Location FiO2 (%)   Oral Monitor Lying Right arm --      Pain Score       No Pain           Vitals:    12/28/21 1851   BP: 151/92   Pulse: 103   Patient Position - Orthostatic VS: Lying         Visual Acuity      ED Medications  Medications   naloxone kit - Given to patient by provider at discharge  [facility]) syringe kit 4 mg (has no administration in time range)       Diagnostic Studies  Results Reviewed     None                 No orders to display              Procedures  ECG 12 Lead Documentation Only    Date/Time: 12/28/2021 7:19 PM  Performed by: Shakir Stearns MD  Authorized by: Shakir Stearns MD     ECG reviewed by me, the ED Provider: yes    Patient location:  ED  Comments:      Sinus tachycardia  No ectopy  No acute ischemic ST or T-wave changes  ED Course                                             MDM  Number of Diagnoses or Management Options  Diagnosis management comments: Patient was observed  No relapse  CATCH referral made  Discharged with Narcan  EKG was sinus tach         Amount and/or Complexity of Data Reviewed  Independent visualization of images, tracings, or specimens: yes        Disposition  Final diagnoses:   Opiate overdose [facility])     Time reflects when diagnosis was documented in both MDM as applicable and the Disposition within this note     Time User Action Codes Description Comment    12/28/2021  8:14 PM Hector Urena 44 Opiate overdose [facility])       ED Disposition     ED Disposition Condition Date/Time Comment    Discharge Stable Tue Dec 28, 2021  8:14 PM Anson Sink discharge to home/self care  Follow-up Information     Follow up With Specialties Details Why Contact Info    [facility]   Follow-up with family doctor within 1 week, call for for referral [phone]            Patient's Medications    No medications on file       No discharge procedures on file      PDMP Review     None          ED Provider  Electronically Signed by           Sarabjit Meza MD  12/28/21 2015

## 2022-03-28 ENCOUNTER — APPOINTMENT (OUTPATIENT)
Dept: PHYSICAL THERAPY | Facility: CLINIC | Age: 47
End: 2022-03-28

## 2022-03-28 PROCEDURE — 97530 THERAPEUTIC ACTIVITIES: CPT | Performed by: PHYSICAL THERAPIST

## 2024-01-07 ENCOUNTER — OFFICE VISIT (OUTPATIENT)
Dept: URGENT CARE | Facility: CLINIC | Age: 49
End: 2024-01-07
Payer: COMMERCIAL

## 2024-01-07 VITALS
OXYGEN SATURATION: 98 % | SYSTOLIC BLOOD PRESSURE: 139 MMHG | RESPIRATION RATE: 16 BRPM | TEMPERATURE: 97.5 F | DIASTOLIC BLOOD PRESSURE: 83 MMHG | HEART RATE: 83 BPM

## 2024-01-07 DIAGNOSIS — Z76.89 RETURN TO WORK EVALUATION: Primary | ICD-10-CM

## 2024-01-07 PROCEDURE — 99203 OFFICE O/P NEW LOW 30 MIN: CPT | Performed by: FAMILY MEDICINE

## 2024-01-07 NOTE — PROGRESS NOTES
[facility]        NAME: Ibrahima Adams is a 48 y.o. male  : 1975    MRN: [id number]  DATE: 2024  TIME: 2:51 PM    Assessment and Plan   Return to work evaluation [Z76.89]  1. Return to work evaluation          Cleared to return to work.  Work note given.    Patient Instructions     Follow up with PCP in 3-5 days.  Proceed to  ER if symptoms worsen.    Chief Complaint     Chief Complaint   Patient presents with    COVID-19     Home covid test positive on . Now states he needs a note.          History of Present Illness       48-year-old male presents today with about 5 days of symptoms confirmed to be COVID-19 via rapid antigen testing at home.  Reports the symptoms are improved and desires to return to work        Review of Systems   Review of Systems   Constitutional:  Positive for diaphoresis, fatigue and fever (99). Negative for chills.   HENT:  Positive for congestion and sore throat.    Respiratory:  Positive for cough (mild).    Gastrointestinal:  Positive for nausea and vomiting.     Current Medications     No current outpatient medications on file.    Current Allergies     Allergies as of 2024    (No Known Allergies)            The following portions of the patient's history were reviewed and updated as appropriate: allergies, current medications, past family history, past medical history, past social history, past surgical history and problem list.     Past Medical History:   Diagnosis Date    Chronic back pain        Past Surgical History:   Procedure Laterality Date    WISDOM TOOTH EXTRACTION         Family History   Problem Relation Age of Onset    Cirrhosis Mother     Cholelithiasis Mother     Diabetes type II Father          Medications have been verified.        Objective   /83   Pulse 83   Temp 97.5 °F (36.4 °C) (Temporal)   Resp 16   SpO2 98%   No LMP for male patient.       Physical Exam     Physical Exam  Vitals and nursing note reviewed.    Constitutional:       General: He is in acute distress.      Appearance: Normal appearance. He is normal weight. He is not ill-appearing, toxic-appearing or diaphoretic.   HENT:      Head: Normocephalic and atraumatic.      Mouth/Throat:      Mouth: Mucous membranes are moist.      Pharynx: No posterior oropharyngeal erythema.   Eyes:      General:         Right eye: No discharge.         Left eye: No discharge.      Conjunctiva/sclera: Conjunctivae normal.   Cardiovascular:      Rate and Rhythm: Normal rate and regular rhythm.   Pulmonary:      Effort: Pulmonary effort is normal. No respiratory distress.      Breath sounds: Normal breath sounds. No wheezing, rhonchi or rales.   Skin:     General: Skin is warm.      Findings: No erythema.   Neurological:      General: No focal deficit present.      Mental Status: He is alert and oriented to person, place, and time.   Psychiatric:         Mood and Affect: Mood normal.         Behavior: Behavior normal.         Thought Content: Thought content normal.         Judgment: Judgment normal.

## 2024-01-07 NOTE — LETTER
January 7, 2024     Patient: Ibrahima Adams   YOB: 1975   Date of Visit: 1/7/2024       To Whom It May Concern:    Ibrahima Adams was evaluated in my office on 1/7/2024.  Please excuse his prior absence.  May return to work on 1/8/2024 if symptoms are improved.  If you have any questions or concerns, please don't hesitate to call.         Sincerely,        Sandra Acevedo MD

## 2024-01-29 ENCOUNTER — OFFICE VISIT (OUTPATIENT)
Dept: FAMILY MEDICINE CLINIC | Facility: CLINIC | Age: 49
End: 2024-01-29
Payer: COMMERCIAL

## 2024-01-29 VITALS
WEIGHT: 187 LBS | SYSTOLIC BLOOD PRESSURE: 126 MMHG | HEART RATE: 70 BPM | OXYGEN SATURATION: 98 % | HEIGHT: 72 IN | BODY MASS INDEX: 25.33 KG/M2 | RESPIRATION RATE: 16 BRPM | DIASTOLIC BLOOD PRESSURE: 68 MMHG

## 2024-01-29 DIAGNOSIS — F41.9 ANXIETY DISORDER, UNSPECIFIED TYPE: Primary | ICD-10-CM

## 2024-01-29 DIAGNOSIS — Z00.00 ENCOUNTER FOR ANNUAL PHYSICAL EXAM: ICD-10-CM

## 2024-01-29 DIAGNOSIS — F95.0 TRANSIENT TICS: ICD-10-CM

## 2024-01-29 DIAGNOSIS — R11.15 CYCLICAL VOMITING: ICD-10-CM

## 2024-01-29 DIAGNOSIS — R41.3 TRANSIENT AMNESIA: ICD-10-CM

## 2024-01-29 PROBLEM — K21.9 GASTROESOPHAGEAL REFLUX DISEASE WITHOUT ESOPHAGITIS: Status: ACTIVE | Noted: 2024-01-29

## 2024-01-29 PROBLEM — R74.01 TRANSAMINITIS: Status: RESOLVED | Noted: 2021-08-04 | Resolved: 2024-01-29

## 2024-01-29 PROBLEM — Z86.16 PERSONAL HISTORY OF COVID-19: Status: ACTIVE | Noted: 2024-01-29

## 2024-01-29 PROBLEM — R79.89 ELEVATED TROPONIN: Status: RESOLVED | Noted: 2021-08-03 | Resolved: 2024-01-29

## 2024-01-29 PROBLEM — R74.01 ELEVATED TRANSAMINASE LEVEL: Status: RESOLVED | Noted: 2021-08-03 | Resolved: 2024-01-29
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
## 2024-01-29 PROBLEM — M25.432 PAIN AND SWELLING OF LEFT WRIST: Status: RESOLVED | Noted: 2021-08-03 | Resolved: 2024-01-29

## 2024-01-29 PROBLEM — S02.401A CLOSED FRACTURE OF MAXILLARY SINUS (HCC): Status: RESOLVED | Noted: 2021-08-03 | Resolved: 2024-01-29

## 2024-01-29 PROBLEM — M25.532 PAIN AND SWELLING OF LEFT WRIST: Status: RESOLVED | Noted: 2021-08-03 | Resolved: 2024-01-29

## 2024-01-29 PROBLEM — D72.829 LEUKOCYTOSIS: Status: RESOLVED | Noted: 2021-08-03 | Resolved: 2024-01-29

## 2024-01-29 PROBLEM — S02.32XA CLOSED FRACTURE OF LEFT ORBITAL FLOOR (HCC): Status: RESOLVED | Noted: 2021-08-03 | Resolved: 2024-01-29

## 2024-01-29 PROBLEM — T50.901A ACCIDENTAL DRUG OVERDOSE: Status: RESOLVED | Noted: 2021-08-03 | Resolved: 2024-01-29

## 2024-01-29 PROCEDURE — 99203 OFFICE O/P NEW LOW 30 MIN: CPT | Performed by: FAMILY MEDICINE

## 2024-01-29 PROCEDURE — 99386 PREV VISIT NEW AGE 40-64: CPT | Performed by: FAMILY MEDICINE

## 2024-01-29 RX ORDER — BUSPIRONE HYDROCHLORIDE 5 MG/1
5 TABLET ORAL 2 TIMES DAILY
Qty: 60 TABLET | Refills: 1 | Status: ON HOLD | OUTPATIENT
Start: 2024-01-29
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
## 2024-01-29 NOTE — ASSESSMENT & PLAN NOTE
- Transient tics with transient amnesia will need further evaluation.    Placing order for CBC, electrolytes, Lyme serology, RPR screening, HIV screening    -Order placed for MRI of the brain with and without contrast as well as routine EEG    -Order placed for neurology consultation

## 2024-01-29 NOTE — ASSESSMENT & PLAN NOTE
Placed order for [facility] psychology for counseling services.  Patient should try to get back in with counselor through his work mental health services    -Patient's anxiety does get to the point where he will not want to get out of bed and will not go to work.  This does affect how he relates with others and works with others.  Recommended starting on BuSpar 5 mg twice daily.  Would avoid any benzodiazepines

## 2024-01-29 NOTE — ASSESSMENT & PLAN NOTE
Annual physical examination performed    -Order given for screening blood work    -Eventually patient will need to have colon cancer screening and will likely need colonoscopy as well as upper endoscopy

## 2024-01-30 ENCOUNTER — TELEPHONE (OUTPATIENT)
Dept: PSYCHIATRY | Facility: CLINIC | Age: 49
End: 2024-01-30

## 2024-01-30 NOTE — TELEPHONE ENCOUNTER
IC called pt 3 times at phone number in chart. Each time message came on to say call cannot be completed at this time.

## 2024-02-01 ENCOUNTER — APPOINTMENT (OUTPATIENT)
Dept: LAB | Facility: CLINIC | Age: 49
End: 2024-02-01
Payer: COMMERCIAL

## 2024-02-01 DIAGNOSIS — Z00.00 ENCOUNTER FOR ANNUAL PHYSICAL EXAM: ICD-10-CM

## 2024-02-01 DIAGNOSIS — R41.3 TRANSIENT AMNESIA: ICD-10-CM

## 2024-02-01 LAB
ALBUMIN SERPL BCP-MCNC: 4.4 G/DL (ref 3.5–5)
ALP SERPL-CCNC: 65 U/L (ref 34–104)
ALT SERPL W P-5'-P-CCNC: 26 U/L (ref 7–52)
ANION GAP SERPL CALCULATED.3IONS-SCNC: 9 MMOL/L
AST SERPL W P-5'-P-CCNC: 18 U/L (ref 13–39)
BASOPHILS # BLD AUTO: 0.1 THOUSANDS/ÂΜL (ref 0–0.1)
BASOPHILS NFR BLD AUTO: 1 % (ref 0–1)
BILIRUB SERPL-MCNC: 0.94 MG/DL (ref 0.2–1)
BUN SERPL-MCNC: 14 MG/DL (ref 5–25)
CALCIUM SERPL-MCNC: 9.3 MG/DL (ref 8.4–10.2)
CHLORIDE SERPL-SCNC: 100 MMOL/L (ref 96–108)
CHOLEST SERPL-MCNC: 225 MG/DL
CO2 SERPL-SCNC: 30 MMOL/L (ref 21–32)
CREAT SERPL-MCNC: 1.21 MG/DL (ref 0.6–1.3)
EOSINOPHIL # BLD AUTO: 0.25 THOUSAND/ÂΜL (ref 0–0.61)
EOSINOPHIL NFR BLD AUTO: 3 % (ref 0–6)
ERYTHROCYTE [DISTWIDTH] IN BLOOD BY AUTOMATED COUNT: 11.9 % (ref 11.6–15.1)
GFR SERPL CREATININE-BSD FRML MDRD: 70 ML/MIN/1.73SQ M
GLUCOSE P FAST SERPL-MCNC: 94 MG/DL (ref 65–99)
HCT VFR BLD AUTO: 48 % (ref 36.5–49.3)
HDLC SERPL-MCNC: 38 MG/DL
HGB BLD-MCNC: 16 G/DL (ref 12–17)
IMM GRANULOCYTES # BLD AUTO: 0.01 THOUSAND/UL (ref 0–0.2)
IMM GRANULOCYTES NFR BLD AUTO: 0 % (ref 0–2)
LDLC SERPL CALC-MCNC: 146 MG/DL (ref 0–100)
LYMPHOCYTES # BLD AUTO: 1.53 THOUSANDS/ÂΜL (ref 0.6–4.47)
LYMPHOCYTES NFR BLD AUTO: 20 % (ref 14–44)
MCH RBC QN AUTO: 30.7 PG (ref 26.8–34.3)
MCHC RBC AUTO-ENTMCNC: 33.3 G/DL (ref 31.4–37.4)
MCV RBC AUTO: 92 FL (ref 82–98)
MONOCYTES # BLD AUTO: 0.61 THOUSAND/ÂΜL (ref 0.17–1.22)
MONOCYTES NFR BLD AUTO: 8 % (ref 4–12)
NEUTROPHILS # BLD AUTO: 5.28 THOUSANDS/ÂΜL (ref 1.85–7.62)
NEUTS SEG NFR BLD AUTO: 68 % (ref 43–75)
NONHDLC SERPL-MCNC: 187 MG/DL
NRBC BLD AUTO-RTO: 0 /100 WBCS
PLATELET # BLD AUTO: 270 THOUSANDS/UL (ref 149–390)
PMV BLD AUTO: 10.7 FL (ref 8.9–12.7)
POTASSIUM SERPL-SCNC: 4.6 MMOL/L (ref 3.5–5.3)
PROT SERPL-MCNC: 7.3 G/DL (ref 6.4–8.4)
RBC # BLD AUTO: 5.21 MILLION/UL (ref 3.88–5.62)
SODIUM SERPL-SCNC: 139 MMOL/L (ref 135–147)
TRIGL SERPL-MCNC: 205 MG/DL
TSH SERPL DL<=0.05 MIU/L-ACNC: 0.66 UIU/ML (ref 0.45–4.5)
WBC # BLD AUTO: 7.78 THOUSAND/UL (ref 4.31–10.16)

## 2024-02-01 PROCEDURE — 84443 ASSAY THYROID STIM HORMONE: CPT

## 2024-02-01 PROCEDURE — 86780 TREPONEMA PALLIDUM: CPT

## 2024-02-01 PROCEDURE — 86618 LYME DISEASE ANTIBODY: CPT

## 2024-02-01 PROCEDURE — 87389 HIV-1 AG W/HIV-1&-2 AB AG IA: CPT

## 2024-02-01 PROCEDURE — 85025 COMPLETE CBC W/AUTO DIFF WBC: CPT

## 2024-02-01 PROCEDURE — 36415 COLL VENOUS BLD VENIPUNCTURE: CPT

## 2024-02-01 PROCEDURE — 80061 LIPID PANEL: CPT

## 2024-02-01 PROCEDURE — 80053 COMPREHEN METABOLIC PANEL: CPT

## 2024-02-02 ENCOUNTER — TELEPHONE (OUTPATIENT)
Age: 49
End: 2024-02-02

## 2024-02-02 LAB
B BURGDOR IGG+IGM SER QL IA: NEGATIVE
HIV 1+2 AB+HIV1 P24 AG SERPL QL IA: NON REACTIVE
TREPONEMA PALLIDUM IGG+IGM AB [PRESENCE] IN SERUM OR PLASMA BY IMMUNOASSAY: NORMAL

## 2024-02-02 NOTE — TELEPHONE ENCOUNTER
IC called pt 3 times at phone number in chart. Each time message came on to say call cannot be completed at this time.   2nd outreach attempt to pt.

## 2024-02-02 NOTE — TELEPHONE ENCOUNTER
Labs posted this morning for the results.  Aside from mildly elevated cholesterol, all of his blood work was normal including his blood count, Lyme serology, syphilis testing, electrolytes.  Nothing on his blood work that would point to a cause of those episodes.  Once again if his episodes are brief and his responses returned back to normal then we are trying to pursue a neurologic workup.  That would include the MRI of his brain, EEG that were ordered as well as neurology consultation.        called and spoke with the patient as well as his wife.  Reviewed lab results which were normal.  Patient is scheduled for MRI of the brain as well as EEG on February 18 and 19.  According to the patient's wife the patient had another episode where his pupils became constricted and he developed headache as well as nauseousness.  Patient was vomiting which was Tuesday night into Wednesday.  Wife states that the patient went in and sat on the toilet because he was feeling nauseated and then did not seem to be responding to her.  She called 911.  EMS arrived and reportedly by that point the patient was coming around and was becoming more responsive.  They checked his blood pressure reading which was elevated as well as a glucose level but the patient's wife does not recall what the glucose level was.  They had offered him transportation to ER for evaluation which the patient declined to do.  Patient states that he does not necessarily have recollections of everything that had transpired and refused to go because at that point he did not feel that it would be beneficial for ER visit and he would be simply sitting around.    I advised both the patient and his wife that if he does experience another episode such as that then they should call EMS and he should proceed to ER.  There is no way that I can get an MRI of the brain or an EEG expedited any sooner than he is already scheduled and if he is admitted then that we will have him  seen by neurology as well as have appropriate testing performed while hospitalized.  Patient and his wife verbalized agreement and understanding

## 2024-02-02 NOTE — TELEPHONE ENCOUNTER
Pts wife called asking if Dr. Rice could contact pt back. Wife of pt said that pt had another episode.  She also stated pts labs were posted on his mychart so she was hoping the labs could be reviewed as well.  Pts wife said please contact pts number

## 2024-02-06 ENCOUNTER — TELEPHONE (OUTPATIENT)
Age: 49
End: 2024-02-06

## 2024-02-06 NOTE — TELEPHONE ENCOUNTER
Pt calling.  He is checking on the status of his [facility] paperwork.  He wants to make sure that the office received it.  It should be coming from [facility].  Please call pt to let him know if the office has it. It would have been faxed yesterday or today.

## 2024-02-09 ENCOUNTER — APPOINTMENT (OUTPATIENT)
Dept: RADIOLOGY | Facility: HOSPITAL | Age: 49
DRG: 093 | End: 2024-02-09
Payer: COMMERCIAL

## 2024-02-09 ENCOUNTER — HOSPITAL ENCOUNTER (EMERGENCY)
Facility: HOSPITAL | Age: 49
Discharge: HOME | End: 2024-02-09
Attending: EMERGENCY MEDICINE
Payer: COMMERCIAL

## 2024-02-09 ENCOUNTER — TELEPHONE (OUTPATIENT)
Age: 49
End: 2024-02-09

## 2024-02-09 ENCOUNTER — APPOINTMENT (INPATIENT)
Dept: RADIOLOGY | Facility: HOSPITAL | Age: 49
DRG: 093 | End: 2024-02-09
Payer: COMMERCIAL

## 2024-02-09 ENCOUNTER — APPOINTMENT (EMERGENCY)
Dept: CT IMAGING | Facility: HOSPITAL | Age: 49
End: 2024-02-09
Payer: COMMERCIAL

## 2024-02-09 ENCOUNTER — HOSPITAL ENCOUNTER (INPATIENT)
Facility: HOSPITAL | Age: 49
LOS: 3 days | Discharge: HOME | DRG: 093 | End: 2024-02-12
Attending: EMERGENCY MEDICINE | Admitting: INTERNAL MEDICINE
Payer: COMMERCIAL

## 2024-02-09 VITALS
DIASTOLIC BLOOD PRESSURE: 96 MMHG | TEMPERATURE: 98.5 F | RESPIRATION RATE: 18 BRPM | SYSTOLIC BLOOD PRESSURE: 152 MMHG | OXYGEN SATURATION: 99 % | HEART RATE: 92 BPM

## 2024-02-09 DIAGNOSIS — E83.42 HYPOMAGNESEMIA: ICD-10-CM

## 2024-02-09 DIAGNOSIS — R11.2 NAUSEA AND VOMITING: ICD-10-CM

## 2024-02-09 DIAGNOSIS — R56.9 OBSERVED SEIZURE-LIKE ACTIVITY (HCC): Primary | ICD-10-CM

## 2024-02-09 DIAGNOSIS — R46.89 CHANGE IN BEHAVIOR: Primary | ICD-10-CM

## 2024-02-09 DIAGNOSIS — R56.9 SEIZURE-LIKE ACTIVITY (HCC): ICD-10-CM

## 2024-02-09 DIAGNOSIS — R46.89 SPELL OF ABNORMAL BEHAVIOR: ICD-10-CM

## 2024-02-09 DIAGNOSIS — R25.1 TREMULOUSNESS: ICD-10-CM

## 2024-02-09 PROBLEM — R41.0 CONFUSION: Status: ACTIVE | Noted: 2024-02-09

## 2024-02-09 PROBLEM — R63.4 UNINTENTIONAL WEIGHT LOSS: Status: ACTIVE | Noted: 2024-02-09

## 2024-02-09 PROBLEM — F19.90 SUBSTANCE USE: Status: ACTIVE | Noted: 2024-02-09

## 2024-02-09 LAB
ALBUMIN SERPL BCP-MCNC: 4.5 G/DL (ref 3.5–5)
ALP SERPL-CCNC: 54 U/L (ref 34–104)
ALT SERPL W P-5'-P-CCNC: 24 U/L (ref 7–52)
AMMONIA PLAS-SCNC: 52 UMOL/L (ref 18–72)
AMPHETAMINES SERPL QL SCN: NEGATIVE
ANION GAP SERPL CALCULATED.3IONS-SCNC: 9 MMOL/L
APAP SERPL-MCNC: <2 UG/ML (ref 10–20)
APAP SERPL-MCNC: <2 UG/ML (ref 10–20)
AST SERPL W P-5'-P-CCNC: 17 U/L (ref 13–39)
ATRIAL RATE: 88 BPM
BARBITURATES UR QL: NEGATIVE
BASE EX.OXY STD BLDV CALC-SCNC: 91.7 % (ref 60–80)
BASE EXCESS BLDV CALC-SCNC: 0.3 MMOL/L
BASOPHILS # BLD AUTO: 0.06 THOUSANDS/ÂΜL (ref 0–0.1)
BASOPHILS NFR BLD AUTO: 1 % (ref 0–1)
BENZODIAZ UR QL: NEGATIVE
BILIRUB SERPL-MCNC: 0.48 MG/DL (ref 0.2–1)
BUN SERPL-MCNC: 15 MG/DL (ref 5–25)
CALCIUM SERPL-MCNC: 9.7 MG/DL (ref 8.4–10.2)
CHLORIDE SERPL-SCNC: 101 MMOL/L (ref 96–108)
CK SERPL-CCNC: 296 U/L (ref 39–308)
CO2 SERPL-SCNC: 31 MMOL/L (ref 21–32)
COCAINE UR QL: NEGATIVE
CREAT SERPL-MCNC: 1.07 MG/DL (ref 0.6–1.3)
EOSINOPHIL # BLD AUTO: 0.11 THOUSAND/ÂΜL (ref 0–0.61)
EOSINOPHIL NFR BLD AUTO: 1 % (ref 0–6)
ERYTHROCYTE [DISTWIDTH] IN BLOOD BY AUTOMATED COUNT: 11.8 % (ref 11.6–15.1)
ETHANOL SERPL-MCNC: <10 MG/DL
ETHANOL SERPL-MCNC: <10 MG/DL
GFR SERPL CREATININE-BSD FRML MDRD: 82 ML/MIN/1.73SQ M
GLUCOSE SERPL-MCNC: 88 MG/DL (ref 65–140)
HCO3 BLDV-SCNC: 25.6 MMOL/L (ref 24–30)
HCT VFR BLD AUTO: 38.5 % (ref 36.5–49.3)
HGB BLD-MCNC: 13.4 G/DL (ref 12–17)
IMM GRANULOCYTES # BLD AUTO: 0.01 THOUSAND/UL (ref 0–0.2)
IMM GRANULOCYTES NFR BLD AUTO: 0 % (ref 0–2)
LIPASE SERPL-CCNC: 8 U/L (ref 11–82)
LYMPHOCYTES # BLD AUTO: 1.18 THOUSANDS/ÂΜL (ref 0.6–4.47)
LYMPHOCYTES NFR BLD AUTO: 15 % (ref 14–44)
MAGNESIUM SERPL-MCNC: 1.7 MG/DL (ref 1.9–2.7)
MCH RBC QN AUTO: 30.9 PG (ref 26.8–34.3)
MCHC RBC AUTO-ENTMCNC: 34.8 G/DL (ref 31.4–37.4)
MCV RBC AUTO: 89 FL (ref 82–98)
METHADONE UR QL: NEGATIVE
MONOCYTES # BLD AUTO: 0.52 THOUSAND/ÂΜL (ref 0.17–1.22)
MONOCYTES NFR BLD AUTO: 7 % (ref 4–12)
NEUTROPHILS # BLD AUTO: 6.05 THOUSANDS/ÂΜL (ref 1.85–7.62)
NEUTS SEG NFR BLD AUTO: 76 % (ref 43–75)
NRBC BLD AUTO-RTO: 0 /100 WBCS
O2 CT BLDV-SCNC: 17 ML/DL
OPIATES UR QL SCN: NEGATIVE
OXYCODONE+OXYMORPHONE UR QL SCN: NEGATIVE
P AXIS: 73 DEGREES
PCO2 BLDV: 44.2 MM HG (ref 42–50)
PCP UR QL: NEGATIVE
PH BLDV: 7.38 [PH] (ref 7.3–7.4)
PHOSPHATE SERPL-MCNC: 2.9 MG/DL (ref 2.7–4.5)
PLATELET # BLD AUTO: 205 THOUSANDS/UL (ref 149–390)
PMV BLD AUTO: 10.4 FL (ref 8.9–12.7)
PO2 BLDV: 70.7 MM HG (ref 35–45)
POTASSIUM SERPL-SCNC: 3.7 MMOL/L (ref 3.5–5.3)
PR INTERVAL: 142 MS
PROT SERPL-MCNC: 7.1 G/DL (ref 6.4–8.4)
QRS AXIS: 55 DEGREES
QRSD INTERVAL: 90 MS
QT INTERVAL: 356 MS
QTC INTERVAL: 430 MS
RBC # BLD AUTO: 4.33 MILLION/UL (ref 3.88–5.62)
SALICYLATES SERPL-MCNC: <5 MG/DL (ref 3–20)
SALICYLATES SERPL-MCNC: <5 MG/DL (ref 3–20)
SODIUM SERPL-SCNC: 141 MMOL/L (ref 135–147)
T WAVE AXIS: 33 DEGREES
THC UR QL: NEGATIVE
TSH SERPL DL<=0.05 MIU/L-ACNC: 0.54 UIU/ML (ref 0.45–4.5)
TSH SERPL DL<=0.05 MIU/L-ACNC: 0.72 UIU/ML (ref 0.45–4.5)
VENTRICULAR RATE: 88 BPM
VIT B12 SERPL-MCNC: 371 PG/ML (ref 180–914)
WBC # BLD AUTO: 7.93 THOUSAND/UL (ref 4.31–10.16)

## 2024-02-09 PROCEDURE — 84443 ASSAY THYROID STIM HORMONE: CPT | Performed by: INTERNAL MEDICINE

## 2024-02-09 PROCEDURE — 82805 BLOOD GASES W/O2 SATURATION: CPT | Performed by: INTERNAL MEDICINE

## 2024-02-09 PROCEDURE — A9585 GADOBUTROL INJECTION: HCPCS | Performed by: INTERNAL MEDICINE

## 2024-02-09 PROCEDURE — 96361 HYDRATE IV INFUSION ADD-ON: CPT

## 2024-02-09 PROCEDURE — 71260 CT THORAX DX C+: CPT

## 2024-02-09 PROCEDURE — 80053 COMPREHEN METABOLIC PANEL: CPT | Performed by: EMERGENCY MEDICINE

## 2024-02-09 PROCEDURE — 36415 COLL VENOUS BLD VENIPUNCTURE: CPT | Performed by: EMERGENCY MEDICINE

## 2024-02-09 PROCEDURE — 83735 ASSAY OF MAGNESIUM: CPT | Performed by: EMERGENCY MEDICINE

## 2024-02-09 PROCEDURE — 80179 DRUG ASSAY SALICYLATE: CPT | Performed by: EMERGENCY MEDICINE

## 2024-02-09 PROCEDURE — 70450 CT HEAD/BRAIN W/O DYE: CPT

## 2024-02-09 PROCEDURE — 80179 DRUG ASSAY SALICYLATE: CPT | Performed by: INTERNAL MEDICINE

## 2024-02-09 PROCEDURE — 74177 CT ABD & PELVIS W/CONTRAST: CPT

## 2024-02-09 PROCEDURE — 99285 EMERGENCY DEPT VISIT HI MDM: CPT | Performed by: EMERGENCY MEDICINE

## 2024-02-09 PROCEDURE — 80307 DRUG TEST PRSMV CHEM ANLYZR: CPT | Performed by: INTERNAL MEDICINE

## 2024-02-09 PROCEDURE — 93005 ELECTROCARDIOGRAM TRACING: CPT

## 2024-02-09 PROCEDURE — 82077 ASSAY SPEC XCP UR&BREATH IA: CPT | Performed by: EMERGENCY MEDICINE

## 2024-02-09 PROCEDURE — 80143 DRUG ASSAY ACETAMINOPHEN: CPT | Performed by: EMERGENCY MEDICINE

## 2024-02-09 PROCEDURE — 99223 1ST HOSP IP/OBS HIGH 75: CPT | Performed by: INTERNAL MEDICINE

## 2024-02-09 PROCEDURE — 84443 ASSAY THYROID STIM HORMONE: CPT | Performed by: EMERGENCY MEDICINE

## 2024-02-09 PROCEDURE — 96374 THER/PROPH/DIAG INJ IV PUSH: CPT

## 2024-02-09 PROCEDURE — BW28ZZZ COMPUTERIZED TOMOGRAPHY (CT SCAN) OF HEAD: ICD-10-PCS | Performed by: INTERNAL MEDICINE

## 2024-02-09 PROCEDURE — 82077 ASSAY SPEC XCP UR&BREATH IA: CPT | Performed by: INTERNAL MEDICINE

## 2024-02-09 PROCEDURE — 83090 ASSAY OF HOMOCYSTEINE: CPT

## 2024-02-09 PROCEDURE — 82550 ASSAY OF CK (CPK): CPT | Performed by: EMERGENCY MEDICINE

## 2024-02-09 PROCEDURE — 82140 ASSAY OF AMMONIA: CPT | Performed by: INTERNAL MEDICINE

## 2024-02-09 PROCEDURE — 70553 MRI BRAIN STEM W/O & W/DYE: CPT

## 2024-02-09 PROCEDURE — 80143 DRUG ASSAY ACETAMINOPHEN: CPT | Performed by: INTERNAL MEDICINE

## 2024-02-09 PROCEDURE — 99255 IP/OBS CONSLTJ NEW/EST HI 80: CPT | Performed by: STUDENT IN AN ORGANIZED HEALTH CARE EDUCATION/TRAINING PROGRAM

## 2024-02-09 PROCEDURE — 84100 ASSAY OF PHOSPHORUS: CPT | Performed by: EMERGENCY MEDICINE

## 2024-02-09 PROCEDURE — 82607 VITAMIN B-12: CPT | Performed by: INTERNAL MEDICINE

## 2024-02-09 PROCEDURE — 99284 EMERGENCY DEPT VISIT MOD MDM: CPT

## 2024-02-09 PROCEDURE — G1004 CDSM NDSC: HCPCS

## 2024-02-09 PROCEDURE — 4A10X4Z MONITORING OF CENTRAL NERVOUS ELECTRICAL ACTIVITY, EXTERNAL APPROACH: ICD-10-PCS | Performed by: INTERNAL MEDICINE

## 2024-02-09 PROCEDURE — 83690 ASSAY OF LIPASE: CPT | Performed by: EMERGENCY MEDICINE

## 2024-02-09 PROCEDURE — 93010 ELECTROCARDIOGRAM REPORT: CPT | Performed by: INTERNAL MEDICINE

## 2024-02-09 PROCEDURE — 85025 COMPLETE CBC W/AUTO DIFF WBC: CPT | Performed by: EMERGENCY MEDICINE

## 2024-02-09 RX ORDER — GADOBUTROL 604.72 MG/ML
8 INJECTION INTRAVENOUS
Status: COMPLETED | OUTPATIENT
Start: 2024-02-09 | End: 2024-02-09

## 2024-02-09 RX ORDER — ONDANSETRON 4 MG/1
4 TABLET, ORALLY DISINTEGRATING ORAL ONCE
Status: COMPLETED | OUTPATIENT
Start: 2024-02-09 | End: 2024-02-09

## 2024-02-09 RX ORDER — LORAZEPAM 2 MG/ML
1 INJECTION INTRAMUSCULAR ONCE
Status: COMPLETED | OUTPATIENT
Start: 2024-02-09 | End: 2024-02-09

## 2024-02-09 RX ORDER — ONDANSETRON 2 MG/ML
4 INJECTION INTRAMUSCULAR; INTRAVENOUS ONCE
Status: DISCONTINUED | OUTPATIENT
Start: 2024-02-09 | End: 2024-02-09

## 2024-02-09 RX ORDER — LANOLIN ALCOHOL/MO/W.PET/CERES
800 CREAM (GRAM) TOPICAL ONCE
Status: COMPLETED | OUTPATIENT
Start: 2024-02-09 | End: 2024-02-09

## 2024-02-09 RX ADMIN — LORAZEPAM 1 MG: 2 INJECTION INTRAMUSCULAR; INTRAVENOUS at 04:12

## 2024-02-09 RX ADMIN — IOHEXOL 100 ML: 350 INJECTION, SOLUTION INTRAVENOUS at 21:26

## 2024-02-09 RX ADMIN — SODIUM CHLORIDE 1000 ML: 0.9 INJECTION, SOLUTION INTRAVENOUS at 04:12

## 2024-02-09 RX ADMIN — ONDANSETRON 4 MG: 4 TABLET, ORALLY DISINTEGRATING ORAL at 13:42

## 2024-02-09 RX ADMIN — IOHEXOL 50 ML: 240 INJECTION, SOLUTION INTRATHECAL; INTRAVASCULAR; INTRAVENOUS; ORAL at 19:30

## 2024-02-09 RX ADMIN — Medication 800 MG: at 05:46

## 2024-02-09 RX ADMIN — GADOBUTROL 8 ML: 604.72 INJECTION INTRAVENOUS at 22:07
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
## 2024-02-09 NOTE — ASSESSMENT & PLAN NOTE
Patient reports a 30 pound weight loss over the past year  Will follow-up with CT imaging of chest abdomen pelvis  Continue with workup as above.
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
## 2024-02-09 NOTE — ASSESSMENT & PLAN NOTE
With prior history of snorting Percocet noted in prior record.  Continue with supportive care.  On UDS

## 2024-02-09 NOTE — ED ATTENDING ATTESTATION
"I, Abdon Raygoza MD, saw and evaluated the patient. I have discussed the patient with the resident and agree with the resident's findings, Plan of Care, and MDM as documented in the resident's note, except where noted. All available labs and Radiology studies were reviewed.  I was present for key portions of any procedure(s) performed by the resident and I was immediately available to provide assistance.    At this point I agree with the current assessment done in the Emergency Department.  I have conducted an independent evaluation of this patient a history and physical is as follows:    49 yo male with a history of chronic back pain, GERD, anxiety, and cyclical vomiting syndrome brought to the ED by wife for evaluation of abnormal behavior. The patient's wife reports increasing bizarre behavior since July 2023. She reports jerky abnormal movements \"like tics\", increased profanity, muscle twitches, and confusion. She additionally reports anorexia, frequent vomiting after meals, and weight loss. All of these symptoms have acutely worsened over the past few weeks. The patient was seen in the [place] ED earlier today --> workup was unremarkable. No abdominal pain, chest pain, or shortness of breath. No focal weakness or numbness. Patient and wife express frustration over inability to schedule an appointment with Neurology.      ROS: per resident physician note    Gen: NAD, AA&Ox3  HEENT: PERRL, EOMI  Neck: supple  CV: RRR  Lungs: CTA B/L  Abdomen: soft, NT/ND  Ext: no swelling or deformity  Neuro: 5/5 strength all extremities, sensation grossly intact  Skin: no rash    ED Course  The patient is comfortable appearing with stable vital signs and a benign physical examination. Unclear etiology of progressively worsening symptoms. Atypical seizures vs MS vs rheumatologic disorder vs psychiatric illness? Extensive workup done at another ED this morning. Case dicussed with Neurology --> they will come to the ED to " evaluate the patient. Disposition and further testing per consultant recommendations.      Critical Care Time  Procedures

## 2024-02-09 NOTE — ED PROVIDER NOTES
History  Chief Complaint   Patient presents with    Anxiety     Patient presents to ER with generalized complaints including anxious, episode of vomiting.     Patient is a 48-year-old male seen in the emergency department brought by EMS with concern for recurrent episodes of disorientation, tremors, occasionally stating expletives.  Family notes that patient has had progressively worsening symptoms since approximately July of last year.  Patient notes no headache, tongue biting, bowel/bladder incontinence, weakness, numbness, or tingling during the episodes.  Family explains that the patient occasionally rubs his face with his hand during these episodes.  Family explains that the patient is scheduled to follow-up as an outpatient for MRI brain and EEG testing.  Patient notes no personal or family history of epilepsy.  Patient states that he drinks alcohol occasionally, but denies other drug use.  Patient notes no definite clear exacerbating or alleviating factors for his symptoms.        Prior to Admission Medications   Prescriptions Last Dose Informant Patient Reported? Taking?   busPIRone (BUSPAR) 5 mg tablet   No No   Sig: Take 1 tablet (5 mg total) by mouth 2 (two) times a day      Facility-Administered Medications: None       Past Medical History:   Diagnosis Date    Chronic back pain        Past Surgical History:   Procedure Laterality Date    WISDOM TOOTH EXTRACTION         Family History   Problem Relation Age of Onset    Cirrhosis Mother     Cholelithiasis Mother     Diabetes type II Father     Stroke Maternal Grandfather     Diabetes Paternal Grandfather      I have reviewed and agree with the history as documented.    E-Cigarette/Vaping    E-Cigarette Use Never User      E-Cigarette/Vaping Substances    Nicotine No     THC No     CBD No     Flavoring No     Other No     Unknown No      Social History     Tobacco Use    Smoking status: Former     Types: Cigars    Smokeless tobacco: Never    Tobacco  comments:     has a cigar about 2/month   Vaping Use    Vaping status: Never Used   Substance Use Topics    Alcohol use: Yes     Alcohol/week: 2.0 standard drinks of alcohol     Types: 1 Glasses of wine, 1 Cans of beer per week     Comment: 3 drinks/week    Drug use: Not Currently     Types: Marijuana, Cocaine, Oxycodone     Comment: snorted cocain about 10 years ago for 1 year       Review of Systems   Constitutional:  Negative for chills and fever.   HENT:  Negative for ear pain and sore throat.    Eyes:  Negative for pain and visual disturbance.   Respiratory:  Negative for cough and shortness of breath.    Cardiovascular:  Negative for chest pain and palpitations.   Gastrointestinal:  Positive for nausea and vomiting. Negative for abdominal pain.   Genitourinary:  Negative for decreased urine volume and difficulty urinating.   Musculoskeletal:  Negative for arthralgias and back pain.   Skin:  Negative for color change and rash.   Neurological:  Negative for weakness and numbness.        Observed seizure-like activity/tremulousness   Psychiatric/Behavioral:  Positive for confusion. Negative for agitation.    All other systems reviewed and are negative.      Physical Exam  Physical Exam  Vitals and nursing note reviewed.   Constitutional:       Appearance: He is well-developed. He is not diaphoretic.   HENT:      Head: Normocephalic and atraumatic.      Right Ear: External ear normal.      Left Ear: External ear normal.      Nose: Nose normal.      Mouth/Throat:      Pharynx: Oropharynx is clear.   Eyes:      General: No scleral icterus.     Conjunctiva/sclera: Conjunctivae normal.   Cardiovascular:      Rate and Rhythm: Normal rate and regular rhythm.      Heart sounds: No murmur heard.  Pulmonary:      Effort: Pulmonary effort is normal. No respiratory distress.      Breath sounds: Normal breath sounds.   Abdominal:      General: There is no distension.      Palpations: Abdomen is soft.      Tenderness: There is  no abdominal tenderness.   Musculoskeletal:         General: No deformity or signs of injury.      Cervical back: Normal range of motion and neck supple.   Skin:     General: Skin is warm and dry.   Neurological:      General: No focal deficit present.      Mental Status: He is alert.      Cranial Nerves: No cranial nerve deficit.      Sensory: No sensory deficit.      Comments: Occasionally tremulous   Psychiatric:         Mood and Affect: Mood normal.         Thought Content: Thought content normal.         Vital Signs  ED Triage Vitals [02/09/24 0348]   Temperature Pulse Respirations Blood Pressure SpO2   98.5 °F (36.9 °C) 92 18 152/96 99 %      Temp Source Heart Rate Source Patient Position - Orthostatic VS BP Location FiO2 (%)   Oral Monitor Sitting Left arm --      Pain Score       5           Vitals:    02/09/24 0348   BP: 152/96   Pulse: 92   Patient Position - Orthostatic VS: Sitting         Visual Acuity      ED Medications  Medications   magnesium Oxide (MAG-OX) tablet 800 mg (has no administration in time range)   sodium chloride 0.9 % bolus 1,000 mL (1,000 mL Intravenous New Bag 2/9/24 0412)   LORazepam (ATIVAN) injection 1 mg (1 mg Intravenous Given 2/9/24 0412)       Diagnostic Studies  Results Reviewed       Procedure Component Value Units Date/Time    CK [[id number]]  (Normal) Collected: 02/09/24 0410    Lab Status: Final result Specimen: Blood from Arm, Right Updated: 02/09/24 0535     Total  U/L     Phosphorus [[id number]]  (Normal) Collected: 02/09/24 0410    Lab Status: Final result Specimen: Blood from Arm, Right Updated: 02/09/24 0523     Phosphorus 2.9 mg/dL     Salicylate level [[id number]]  (Normal) Collected: 02/09/24 0410    Lab Status: Final result Specimen: Blood from Arm, Right Updated: 02/09/24 0522     Salicylate Lvl <5 mg/dL     Acetaminophen level-If concentration is detectable, please discuss with medical  on call. [[id number]]  (Abnormal) Collected: 02/09/24 0410     Lab Status: Final result Specimen: Blood from Arm, Right Updated: 02/09/24 0522     Acetaminophen Level <2 ug/mL     TSH, 3rd generation with Free T4 reflex [[id number]]  (Normal) Collected: 02/09/24 0410    Lab Status: Final result Specimen: Blood from Arm, Right Updated: 02/09/24 0509     TSH 3RD GENERATON 0.720 uIU/mL     Comprehensive metabolic panel [[id number]] Collected: 02/09/24 0410    Lab Status: Final result Specimen: Blood from Arm, Right Updated: 02/09/24 0453     Sodium 141 mmol/L      Potassium 3.7 mmol/L      Chloride 101 mmol/L      CO2 31 mmol/L      ANION GAP 9 mmol/L      BUN 15 mg/dL      Creatinine 1.07 mg/dL      Glucose 88 mg/dL      Calcium 9.7 mg/dL      AST 17 U/L      ALT 24 U/L      Alkaline Phosphatase 54 U/L      Total Protein 7.1 g/dL      Albumin 4.5 g/dL      Total Bilirubin 0.48 mg/dL      eGFR 82 ml/min/1.73sq m     Narrative:      [employer] Kid[employer] guidelines for Chronic Kidney Disease (CKD):     Stage 1 with normal or high GFR (GFR > 90 mL/min/1.73 square meters)    Stage 2 Mild CKD (GFR = 60-89 mL/min/1.73 square meters)    Stage 3A Moderate CKD (GFR = 45-59 mL/min/1.73 square meters)    Stage 3B Moderate CKD (GFR = 30-44 mL/min/1.73 square meters)    Stage 4 Severe CKD (GFR = 15-29 mL/min/1.73 square meters)    Stage 5 End Stage CKD (GFR <15 mL/min/1.73 square meters)  Note: GFR calculation is accurate only with a steady state creatinine    Magnesium [[id number]]  (Abnormal) Collected: 02/09/24 0410    Lab Status: Final result Specimen: Blood from Arm, Right Updated: 02/09/24 0453     Magnesium 1.7 mg/dL     Lipase [[id number]]  (Abnormal) Collected: 02/09/24 0410    Lab Status: Final result Specimen: Blood from Arm, Right Updated: 02/09/24 0453     Lipase 8 u/L     Ethanol [[id number]]  (Normal) Collected: 02/09/24 0410    Lab Status: Final result Specimen: Blood from Arm, Right Updated: 02/09/24 0451     Ethanol Lvl <10 mg/dL     CBC and differential [[id number]]   (Abnormal) Collected: 02/09/24 0410    Lab Status: Final result Specimen: Blood from Arm, Right Updated: 02/09/24 0436     WBC 7.93 Thousand/uL      RBC 4.33 Million/uL      Hemoglobin 13.4 g/dL      Hematocrit 38.5 %      MCV 89 fL      MCH 30.9 pg      MCHC 34.8 g/dL      RDW 11.8 %      MPV 10.4 fL      Platelets 205 Thousands/uL      nRBC 0 /100 WBCs      Neutrophils Relative 76 %      Immat GRANS % 0 %      Lymphocytes Relative 15 %      Monocytes Relative 7 %      Eosinophils Relative 1 %      Basophils Relative 1 %      Neutrophils Absolute 6.05 Thousands/µL      Immature Grans Absolute 0.01 Thousand/uL      Lymphocytes Absolute 1.18 Thousands/µL      Monocytes Absolute 0.52 Thousand/µL      Eosinophils Absolute 0.11 Thousand/µL      Basophils Absolute 0.06 Thousands/µL     Rapid drug screen, urine [[id number]]     Lab Status: No result Specimen: Urine     UA w Reflex to Microscopic w Reflex to Culture [[id number]]     Lab Status: No result Specimen: Urine, Clean Catch                    CT head wo contrast   Final Result by Vanesa Nazario MD (02/09 0521)      No acute intracranial abnormality.                  Workstation performed: [id number]                    Procedures  ECG 12 Lead Documentation Only    Date/Time: 2/9/2024 4:12 AM    Performed by: Almas Chatman MD  Authorized by: Almas Chatman MD    Indications / Diagnosis:  Observed seizure-like activity  ECG reviewed by me, the ED Provider: yes    Patient location:  ED  Rate:     ECG rate:  88    ECG rate assessment: normal    Rhythm:     Rhythm: sinus rhythm    QRS:     QRS axis:  Normal  T waves:     T waves: non-specific    Comments:      Normal sinus rhythm at 88, normal axis, , QRS 90, QTc 430, nonspecific T-wave abnormality, no definite evidence of acute ischemia           ED Course  ED Course as of 02/09/24 0541   Fri Feb 09, 2024   0536 CT head-      IMPRESSION:     No acute intracranial abnormality.                                        SBIRT 22yo+      Flowsheet Row Most Recent Value   Initial Alcohol Screen: US AUDIT-C     1. How often do you have a drink containing alcohol? 0 Filed at: 02/09/2024 0502   2. How many drinks containing alcohol do you have on a typical day you are drinking?  0 Filed at: 02/09/2024 0502   3a. Male UNDER 65: How often do you have five or more drinks on one occasion? 0 Filed at: 02/09/2024 0502   Audit-C Score 0 Filed at: 02/09/2024 0502   HANY: How many times in the past year have you...    Used an illegal drug or used a prescription medication for non-medical reasons? Never Filed at: 02/09/2024 0502                      Medical Decision Making  Patient is a 48-year-old male seen in the emergency department brought by EMS with concern for observed seizure-like activity, tremulousness, nausea/vomiting.  EKG was obtained and noted.  CT head showed no acute intracranial abnormality.  Patient was treated with medication for symptom control.  Laboratory evaluation remarkable for low magnesium of 1.7, white blood cell count of 7.93, 76% neutrophils.  No definite cause of the patient's symptoms was discovered.  There is no evidence of postictal episode in the emergency department.  Patient will require evaluation by neurologist with concern for possible tic disorder versus seizure disorder versus other neurologic etiology.  Patient declined admission to the hospital for further evaluation and treatment. Plan to have patient follow up with neurology/outpatient providers.  Patient stable for discharge home.  Discharge instructions were reviewed with patient    Amount and/or Complexity of Data Reviewed  Labs: ordered. Decision-making details documented in ED Course.  Radiology: ordered. Decision-making details documented in ED Course.  ECG/medicine tests: ordered and independent interpretation performed. Decision-making details documented in ED Course.    Risk  OTC drugs.  Prescription drug  management.             Disposition  Final diagnoses:   Nausea and vomiting   Tremulousness   Observed seizure-like activity (HCC)   Hypomagnesemia     Time reflects when diagnosis was documented in both MDM as applicable and the Disposition within this note       Time User Action Codes Description Comment    2/9/2024  3:32 AM Almas Chatman Add [R11.2] Nausea and vomiting     2/9/2024  3:57 AM Almas Chatman Modify [R11.2] Nausea and vomiting     2/9/2024  3:57 AM Almsa Chatman Add [R25.1] Tremulousness     2/9/2024  3:57 AM Butileana Almas Add [R56.9] Observed seizure-like activity (HCC)     2/9/2024  3:57 AM Almas Chatman Modify [R25.1] Tremulousness     2/9/2024  3:57 AM Almas Chatman Modify [R56.9] Observed seizure-like activity (HCC)     2/9/2024  4:56 AM Almas Chatman Add [E83.42] Hypomagnesemia           ED Disposition       ED Disposition   Discharge    Condition   Stable    Date/Time   Fri Feb 9, 2024 0539    Comment   Ibrahima Christelclifford discharge to home/self care.                   Follow-up Information       Follow up With Specialties Details Why Contact Info Additional Information    [facility] Call in 1 day  [address]  [address]  [place] [facility] [address] [phone] [facility] [place], [address], [address], [place], [place], [address] [phone]    Charles Rice DO Family Medicine Call in 1 day  [address] [address] [phone]               Patient's Medications   Discharge Prescriptions    No medications on file           PDMP Review       None            ED Provider  Electronically Signed by             Almas Chatman MD  02/09/24 0541

## 2024-02-09 NOTE — TELEPHONE ENCOUNTER
Patient was aware Dr was out ill. I did call and reiterate this to him. Unfortunately the staff did not know if the provider was out as it was on a day by day basis this message was sent 2 days ago. Staff does not see messages that are in the Dr's task list unless they had access.

## 2024-02-09 NOTE — DISCHARGE INSTRUCTIONS
Do not drive or operate heavy machinery until you are cleared by neurology. Follow up with neurology/outpatient providers, and return to the emergency department for new or worsening symptoms.    CT BRAIN - WITHOUT CONTRAST     INDICATION:   seizure-like activity.     COMPARISON: 8/2/2021.     TECHNIQUE:  CT examination of the brain was performed.  Multiplanar 2D reformatted images were created from the source data.     Radiation dose length product (DLP) for this visit:  1568.6 mGy-cm .  This examination, like all CT scans performed in the [facility], was performed utilizing techniques to minimize radiation dose exposure, including the use of iterative   reconstruction and automated exposure control.     IMAGE QUALITY:  Diagnostic.     FINDINGS:     PARENCHYMA:  No intracranial mass, mass effect or midline shift. No CT signs of acute infarction.  No acute parenchymal hemorrhage.     VENTRICLES AND EXTRA-AXIAL SPACES:  Normal for the patient's age.     VISUALIZED ORBITS: Normal visualized orbits.     PARANASAL SINUSES: Normal visualized paranasal sinuses.     CALVARIUM AND EXTRACRANIAL SOFT TISSUES:  Normal.     IMPRESSION:     No acute intracranial abnormality.

## 2024-02-09 NOTE — TELEPHONE ENCOUNTER
Wife called in stating patient was in ER last night for seizure-like episodes. Wife is requesting a call back from Dr Rice to discuss details.

## 2024-02-09 NOTE — Clinical Note
Case was discussed with Dr. Haney and the patient's admission status was agreed to be Admission Status: observation status to the service of Dr. Haney.

## 2024-02-09 NOTE — ASSESSMENT & PLAN NOTE
Presenting with ongoing chronic symptoms of worsening intermittent confusion, repetitive behavior and tics.  Potential considerations would be rule out seizure versus underlying psychiatric condition versus TIA versus transient global amnesia versus occult malignancy/CNS process versus metabolic process  Consult neurology noted.  MRI brain rule out occult malignancy  EEG-EMU?  Will defer to neurology regarding EMU  Neurology requesting psychiatric assessment.  Will consider psychiatric assessment if MRI negative and seizure workup negative in addition to metabolic studies  TSH, B12,ammonia,vbg,uds,coma
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
## 2024-02-09 NOTE — ASSESSMENT & PLAN NOTE
Presenting with ongoing chronic symptoms of worsening intermittent confusion, repetitive behavior and tics.  Potential considerations would be rule out seizure versus underlying psychiatric  versus occult malignancy/CNS process versus metabolic process.  History not consistent with transient global amnesia or TIA  Consult neurology noted.  MRI brain rule out occult malignancy  EEG-EMU?  Will defer to neurology regarding EMU.  Discussed with neurology in ED.  Will begin with spot EEG first  Neurology requesting psychiatric assessment.  Will consider psychiatric assessment if MRI negative and seizure workup negative in addition to metabolic studies.  Of note, patient reports a 30 pound weight loss.  Will follow-up on CT imaging of chest abdomen pelvis to rule out occult malignancy/paraneoplastic process?  TSH, B12,ammonia,vbg,uds,coma

## 2024-02-10 LAB — HCYS SERPL-SCNC: 10.7 UMOL/L (ref 5–15)

## 2024-02-10 PROCEDURE — 36415 COLL VENOUS BLD VENIPUNCTURE: CPT

## 2024-02-10 PROCEDURE — 83918 ORGANIC ACIDS TOTAL QUANT: CPT

## 2024-02-10 PROCEDURE — 83825 ASSAY OF MERCURY: CPT

## 2024-02-10 PROCEDURE — 82300 ASSAY OF CADMIUM: CPT

## 2024-02-10 PROCEDURE — 83655 ASSAY OF LEAD: CPT

## 2024-02-10 PROCEDURE — 99233 SBSQ HOSP IP/OBS HIGH 50: CPT | Performed by: STUDENT IN AN ORGANIZED HEALTH CARE EDUCATION/TRAINING PROGRAM

## 2024-02-10 PROCEDURE — 99232 SBSQ HOSP IP/OBS MODERATE 35: CPT | Performed by: INTERNAL MEDICINE

## 2024-02-10 PROCEDURE — 82390 ASSAY OF CERULOPLASMIN: CPT

## 2024-02-10 PROCEDURE — 82175 ASSAY OF ARSENIC: CPT

## 2024-02-10 PROCEDURE — 82525 ASSAY OF COPPER: CPT

## 2024-02-10 RX ORDER — MAGNESIUM SULFATE HEPTAHYDRATE 40 MG/ML
2 INJECTION, SOLUTION INTRAVENOUS ONCE
Status: COMPLETED | OUTPATIENT
Start: 2024-02-10 | End: 2024-02-10

## 2024-02-10 RX ORDER — BUSPIRONE HYDROCHLORIDE 5 MG/1
5 TABLET ORAL 2 TIMES DAILY
Status: DISCONTINUED | OUTPATIENT
Start: 2024-02-10 | End: 2024-02-12

## 2024-02-10 RX ADMIN — MAGNESIUM SULFATE HEPTAHYDRATE 2 G: 40 INJECTION, SOLUTION INTRAVENOUS at 11:18

## 2024-02-10 RX ADMIN — BUSPIRONE HYDROCHLORIDE 5 MG: 5 TABLET ORAL at 11:49

## 2024-02-10 RX ADMIN — BUSPIRONE HYDROCHLORIDE 5 MG: 5 TABLET ORAL at 20:20

## 2024-02-10 NOTE — PROGRESS NOTES
[facility]  Progress Note  Name: Ibrahima Adams I  MRN: [id number]  Unit/Bed#: ED 19 I Date of Admission: 2/9/2024   Date of Service: 2/10/2024 I Hospital Day: 1    Assessment/Plan   * Spells of abnormal behavior  Assessment & Plan  Presenting with ongoing chronic symptoms of worsening intermittent confusion, repetitive behavior and tics.  Also reported weight loss  Patient evaluated by neurology , differential diagnosis seizure, encephalitis, stroke, malignancy, paraneoplastic syndrome, primary psychiatric disorder or metabolic   Brain MRI is negative for acute abnormality  Chest abdomen pelvis CT scan without evidence of malignancy  Normal homocysteine level  Negative UDS  Previous workup  including HIV and Lyme titer negative  Normal TSH  Follow on MMA,  ceruloplasmin, heavy metal and copper level  Follow on EEG  Neurology is following    Unintentional weight loss  Assessment & Plan  Patient reports a 30 pound weight loss over the past year  Chest abdomen pelvis CT scan negative for malignancy   Continue with above workup      Substance use  Assessment & Plan  With prior history of snorting Percocet noted in prior record.  Continue with supportive care.  Negative UDS    Anxiety disorder  Assessment & Plan  Unclear if above contributing  Resume home dose BuSpar             VTE Pharmacologic Prophylaxis: VTE Score: 1 Low Risk (Score 0-2) - Encourage Ambulation.    Mobility:      HLM Goal NOT achieved. Continue with multidisciplinary rounding and encourage appropriate mobility to improve upon HLM goals.    Patient Centered Rounds: I performed bedside rounds with nursing staff today.   Discussions with Specialists or Other Care Team Provider:     Education and Discussions with Family / Patient: Updated  (wife) at bedside.    Total Time Spent on Date of Encounter in care of patient: 35 mins. This time was spent on one or more of the following: performing physical  exam; counseling and coordination of care; obtaining or reviewing history; documenting in the medical record; reviewing/ordering tests, medications or procedures; communicating with other healthcare professionals and discussing with patient's family/caregivers.    Current Length of Stay: 1 day(s)  Current Patient Status: Inpatient   Certification Statement: The patient will continue to require additional inpatient hospital stay due to workup for spells  Discharge Plan: When cleared by neurology    Code Status: Prior    Subjective:   Patient seen and examined  Comfortable in bed  No event overnight  He feels better  No chest pain or shortness of breath    Wife at bedside    Objective:     Vitals:   Temp (24hrs), Av.7 °F (36.5 °C), Min:97.7 °F (36.5 °C), Max:97.7 °F (36.5 °C)    Temp:  [97.7 °F (36.5 °C)] 97.7 °F (36.5 °C)  HR:  [65-96] 68  Resp:  [18-20] 18  BP: (138-156)/(64-88) 138/76  SpO2:  [98 %-100 %] 98 %  Body mass index is 24.41 kg/m².     Input and Output Summary (last 24 hours):   No intake or output data in the 24 hours ending 02/10/24 1111    Physical Exam:   Physical Exam   Patient is awake alert oriented no acute distress   Comfortable in bed  Lungs clear to auscultation bilateral  Heart positive S1-S2 no murmur   Abdomen soft nontender  Lower extremities no edema      Additional Data:     Labs:  Results from last 7 days   Lab Units 24  0410   WBC Thousand/uL 7.93   HEMOGLOBIN g/dL 13.4   HEMATOCRIT % 38.5   PLATELETS Thousands/uL 205   NEUTROS PCT % 76*   LYMPHS PCT % 15   MONOS PCT % 7   EOS PCT % 1     Results from last 7 days   Lab Units 24  0410   SODIUM mmol/L 141   POTASSIUM mmol/L 3.7   CHLORIDE mmol/L 101   CO2 mmol/L 31   BUN mg/dL 15   CREATININE mg/dL 1.07   ANION GAP mmol/L 9   CALCIUM mg/dL 9.7   ALBUMIN g/dL 4.5   TOTAL BILIRUBIN mg/dL 0.48   ALK PHOS U/L 54   ALT U/L 24   AST U/L 17   GLUCOSE RANDOM mg/dL 88                       Lines/Drains:  Invasive Devices        Peripheral Intravenous Line  Duration             Peripheral IV 02/09/24 Left Antecubital <1 day                          Imaging: Imaging study reviewed    Recent Cultures (last 7 days):         Last 24 Hours Medication List:        Today, Patient Was Seen By: Rogelio Mirza DO    **Please Note: This note may have been constructed using a voice recognition system.**
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
## 2024-02-10 NOTE — ASSESSMENT & PLAN NOTE
Presenting with ongoing chronic symptoms of worsening intermittent confusion, repetitive behavior and tics.  Also reported weight loss  Patient evaluated by neurology , differential diagnosis seizure, encephalitis, stroke, malignancy, paraneoplastic syndrome, primary psychiatric disorder or metabolic   Brain MRI is negative for acute abnormality  Chest abdomen pelvis CT scan without evidence of malignancy  Normal homocysteine level  Negative UDS  Previous workup  including HIV and Lyme titer negative  Normal TSH  Follow on MMA,  ceruloplasmin, heavy metal and copper level  Follow on EEG  Neurology is following

## 2024-02-10 NOTE — ED NOTES
Patient initially changed into hospital gown and put personal clothing back on     Keiko Meneses RN  02/10/24 0697
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
## 2024-02-10 NOTE — UTILIZATION REVIEW
NOTIFICATION OF INPATIENT ADMISSION      AUTHORIZATION REQUEST   SERVICING FACILITY:   [facility]  Address: [address] [place] [address]  Tax ID: [id number]  NPI: [id number] ATTENDING PROVIDER:  Attending Name and NPI#: Rogelio Mirza Do [[id number]]  Address: [address] [place] [address]  Phone: [phone]   ADMISSION INFORMATION:  Place of Service: Inpatient [facility]  Place of Service Code: 21  Inpatient Admission Date/Time: 2/9/24  3:24 PM  Discharge Date/Time: No discharge date for patient encounter.  Admitting Diagnosis Code/Description:  Nausea and vomiting [R11.2]  Seizure-like activity (HCC) [R56.9]  Change in behavior [R46.89]  Known medical problems [Z78.9]     UTILIZATION REVIEW CONTACT:  Sandra Lancaster, Utilization   Network Utilization Review Department  Phone: [phone]  Fax: [phone]  Email: [email or web address]  Contact for approvals/pending authorizations, clinical reviews, and discharge.     PHYSICIAN ADVISORY SERVICES:  Medical Necessity Denial & Xlsb-kp-Cozi Review  Phone: [phone]  Fax: [phone]  Email: [email or web address]     DISCHARGE SUPPORT TEAM:  For Patients Discharge Needs & Updates  Phone: [phone] opt. 2 Fax: [phone]  Email: [email or web address]

## 2024-02-10 NOTE — ASSESSMENT & PLAN NOTE
With prior history of snorting Percocet noted in prior record.  Continue with supportive care.  Negative UDS

## 2024-02-10 NOTE — ASSESSMENT & PLAN NOTE
Patient reports a 30 pound weight loss over the past year  Chest abdomen pelvis CT scan negative for malignancy   Continue with above workup

## 2024-02-11 ENCOUNTER — APPOINTMENT (INPATIENT)
Dept: NEUROLOGY | Facility: CLINIC | Age: 49
DRG: 093 | End: 2024-02-11
Payer: COMMERCIAL

## 2024-02-11 PROBLEM — R56.9 SEIZURE-LIKE ACTIVITY (HCC): Status: ACTIVE | Noted: 2024-02-11

## 2024-02-11 LAB — CERULOPLASMIN SERPL-MCNC: 21.6 MG/DL (ref 16–31)

## 2024-02-11 PROCEDURE — 95816 EEG AWAKE AND DROWSY: CPT

## 2024-02-11 PROCEDURE — 95819 EEG AWAKE AND ASLEEP: CPT | Performed by: PSYCHIATRY & NEUROLOGY

## 2024-02-11 PROCEDURE — 99232 SBSQ HOSP IP/OBS MODERATE 35: CPT | Performed by: INTERNAL MEDICINE

## 2024-02-11 RX ORDER — AMOXICILLIN 250 MG
1 CAPSULE ORAL 2 TIMES DAILY
Status: DISCONTINUED | OUTPATIENT
Start: 2024-02-11 | End: 2024-02-12 | Stop reason: HOSPADM

## 2024-02-11 RX ORDER — POLYETHYLENE GLYCOL 3350 17 G/17G
17 POWDER, FOR SOLUTION ORAL DAILY
Status: DISCONTINUED | OUTPATIENT
Start: 2024-02-11 | End: 2024-02-12 | Stop reason: HOSPADM

## 2024-02-11 RX ADMIN — BUSPIRONE HYDROCHLORIDE 5 MG: 5 TABLET ORAL at 20:54

## 2024-02-11 RX ADMIN — SENNOSIDES, DOCUSATE SODIUM 1 TABLET: 8.6; 5 TABLET ORAL at 11:28

## 2024-02-11 RX ADMIN — BUSPIRONE HYDROCHLORIDE 5 MG: 5 TABLET ORAL at 07:44

## 2024-02-11 RX ADMIN — POLYETHYLENE GLYCOL 3350 17 G: 17 POWDER, FOR SOLUTION ORAL at 11:28

## 2024-02-11 NOTE — PLAN OF CARE
Problem: PAIN - ADULT  Goal: Verbalizes/displays adequate comfort level or baseline comfort level  Description: Interventions:  - Encourage patient to monitor pain and request assistance  - Assess pain using appropriate pain scale  - Administer analgesics based on type and severity of pain and evaluate response  - Implement non-pharmacological measures as appropriate and evaluate response  - Notify physician/advanced practitioner if interventions unsuccessful or patient reports new pain  Outcome: Progressing     Problem: INFECTION - ADULT  Goal: Absence or prevention of progression during hospitalization  Description: INTERVENTIONS:  - Assess and monitor for signs and symptoms of infection  - Monitor lab/diagnostic results  - Monitor all insertion sites, i.e. indwelling lines, tubes, and drains  - Piru appropriate cooling/warming therapies per order  - Administer medications as ordered  - Instruct and encourage patient and family to use good hand hygiene technique  - Identify and instruct in appropriate isolation precautions for identified infection/condition  Outcome: Progressing     Problem: SAFETY ADULT  Goal: Patient will remain free of falls  Description: INTERVENTIONS:  - Educate patient/family on patient safety including physical limitations  - Instruct patient to call for assistance with activity   - Consult OT/PT to assist with strengthening/mobility   - Keep Call bell within reach  - Keep bed low and locked with side rails adjusted as appropriate  - Keep care items and personal belongings within reach  - Initiate and maintain comfort rounds  - Make Fall Risk Sign visible to staff  - Offer Toileting every 2 Hours, in advance of need  - Initiate/Maintain bed alarm  - Obtain necessary fall risk management equipment.  - Apply yellow socks and bracelet for high fall risk patients  - Consider moving patient to room near nurses station  Outcome: Progressing     Problem: DISCHARGE PLANNING  Goal: Discharge  to home or other facility with appropriate resources  Description: INTERVENTIONS:  - Identify barriers to discharge w/patient and caregiver  - Arrange for needed discharge resources and transportation as appropriate  - Identify discharge learning needs (meds, wound care, etc.)  - Refer to Case Management Department for coordinating discharge planning if the patient needs post-hospital services based on physician/advanced practitioner order or complex needs related to functional status, cognitive ability, or social support system  Outcome: Progressing     Problem: Knowledge Deficit  Goal: Patient/family/caregiver demonstrates understanding of disease process, treatment plan, medications, and discharge instructions  Description: Complete learning assessment and assess knowledge base.  Interventions:  - Provide teaching at level of understanding  - Provide teaching via preferred learning methods  Outcome: Progressing     Problem: NEUROSENSORY - ADULT  Goal: Achieves stable or improved neurological status  Description: INTERVENTIONS  - Monitor and report changes in neurological status  - Monitor vital signs such as temperature, blood pressure, glucose, and any other labs ordered   - Initiate measures to prevent increased intracranial pressure  - Monitor for seizure activity and implement precautions if appropriate      Outcome: Progressing  Goal: Remains free of injury related to seizures activity  Description: INTERVENTIONS  - Maintain airway, patient safety  and administer oxygen as ordered  - Monitor patient for seizure activity, document and report duration and description of seizure to physician/advanced practitioner  - If seizure occurs,  ensure patient safety during seizure  - Reorient patient post seizure  - Seizure pads on all 4 side rails  - Instruct patient/family to notify RN of any seizure activity including if an aura is experienced  - Instruct patient/family to call for assistance with activity based on  nursing assessment  - Administer anti-seizure medications if ordered    Outcome: Progressing     Problem: BEHAVIOR  Goal: Pt/Family maintain appropriate behavior and adhere to behavioral management agreement, if implemented  Description: INTERVENTIONS:  - Assess the family dynamic   - Encourage verbalization of thoughts and concerns in a socially appropriate manner  - Assess patient/family's coping skills and non-compliant behavior (including use of illegal substances).  - Utilize positive, consistent limit setting strategies supporting safety of patient, staff and others  - Initiate consult with Case Management, Spiritual Care or other ancillary services as appropriate  - If a patient's/visitor's behavior jeopardizes the safety of the patient, staff, or others, refer to organization procedure.   - Notify Security of behavior or suspected illegal substances which indicate the need for search of the patient and/or belongings  - Encourage participation in the decision making process about a behavioral management agreement; implement if patient meets criteria  Outcome: Progressing      No

## 2024-02-11 NOTE — ASSESSMENT & PLAN NOTE
Presenting with ongoing chronic symptoms of worsening intermittent confusion, repetitive behavior and tics.  Also reported weight loss  Patient evaluated by neurology , differential diagnosis seizure, encephalitis, stroke, malignancy, paraneoplastic syndrome, primary psychiatric disorder or metabolic   Brain MRI is negative for acute abnormality  Chest abdomen pelvis CT scan without evidence of malignancy  Normal homocysteine level  Negative UDS  Previous workup  including HIV and Lyme titer negative  Normal TSH  Follow on MMA,  ceruloplasmin, heavy metal and copper level  Follow on EEG  Neurology is following  Psych eval

## 2024-02-11 NOTE — PROGRESS NOTES
[facility]  Progress Note  Name: Ibrahima Adams I  MRN: [id number]  Unit/Bed#: [facility] 709-01 I Date of Admission: 2/9/2024   Date of Service: 2/11/2024 I Hospital Day: 2    Assessment/Plan   * Spells of abnormal behavior  Assessment & Plan  Presenting with ongoing chronic symptoms of worsening intermittent confusion, repetitive behavior and tics.  Also reported weight loss  Patient evaluated by neurology , differential diagnosis seizure, encephalitis, stroke, malignancy, paraneoplastic syndrome, primary psychiatric disorder or metabolic   Brain MRI is negative for acute abnormality  Chest abdomen pelvis CT scan without evidence of malignancy  Normal homocysteine level  Negative UDS  Previous workup  including HIV and Lyme titer negative  Normal TSH  Follow on MMA,  ceruloplasmin, heavy metal and copper level  Follow on EEG  Neurology is following  Psych eval    Unintentional weight loss  Assessment & Plan  Patient reports a 30 pound weight loss over the past year  Chest abdomen pelvis CT scan negative for malignancy   Continue with above workup      Substance use  Assessment & Plan  With prior history of snorting Percocet noted in prior record.  Continue with supportive care.  Negative UDS    Anxiety disorder  Assessment & Plan  Unclear if above contributing  Continue home dose BuSpar  Psych consult             VTE Pharmacologic Prophylaxis: VTE Score: 1 Low Risk (Score 0-2) - Encourage Ambulation.    Mobility:   Basic Mobility Inpatient Raw Score: 24  JH-HLM Goal: 8: Walk 250 feet or more  JH-HLM Achieved: 8: Walk 250 feet ot more  HLM Goal achieved. Continue to encourage appropriate mobility.    Patient Centered Rounds: I performed bedside rounds with nursing staff today.   Discussions with Specialists or Other Care Team Provider:     Education and Discussions with Family / Patient: Updated  (wife) at bedside.    Total Time Spent on Date of Encounter in care of  patient: 35 mins. This time was spent on one or more of the following: performing physical exam; counseling and coordination of care; obtaining or reviewing history; documenting in the medical record; reviewing/ordering tests, medications or procedures; communicating with other healthcare professionals and discussing with patient's family/caregivers.    Current Length of Stay: 2 day(s)  Current Patient Status: Inpatient   Certification Statement: The patient will continue to require additional inpatient hospital stay due to above  Discharge Plan: Anticipate discharge tomorrow to home.    Code Status: Prior    Subjective:   Patient seen and examined  Comfortable in bed  Wife reported transient recurrent spell yesterday night  No nausea vomiting or diarrhea    Objective:     Vitals:   Temp (24hrs), Av.6 °F (37 °C), Min:98.3 °F (36.8 °C), Max:98.8 °F (37.1 °C)    Temp:  [98.3 °F (36.8 °C)-98.8 °F (37.1 °C)] 98.3 °F (36.8 °C)  HR:  [56-68] 56  Resp:  [16-20] 16  BP: (128-147)/(75-84) 128/75  SpO2:  [96 %-98 %] 98 %  Body mass index is 24.41 kg/m².     Input and Output Summary (last 24 hours):     Intake/Output Summary (Last 24 hours) at 2024 0840  Last data filed at 2/10/2024 1318  Gross per 24 hour   Intake 50 ml   Output --   Net 50 ml       Physical Exam:   Physical Exam   Patient is awake alert oriented no acute distress   Comfortable in bed  Lungs clear to auscultation bilateral  Heart positive S1-S2 no murmur   Abdomen soft nontender  Lower extremities no edema    Additional Data:     Labs:  Results from last 7 days   Lab Units 24  0410   WBC Thousand/uL 7.93   HEMOGLOBIN g/dL 13.4   HEMATOCRIT % 38.5   PLATELETS Thousands/uL 205   NEUTROS PCT % 76*   LYMPHS PCT % 15   MONOS PCT % 7   EOS PCT % 1     Results from last 7 days   Lab Units 24  0410   SODIUM mmol/L 141   POTASSIUM mmol/L 3.7   CHLORIDE mmol/L 101   CO2 mmol/L 31   BUN mg/dL 15   CREATININE mg/dL 1.07   ANION GAP mmol/L 9   CALCIUM  mg/dL 9.7   ALBUMIN g/dL 4.5   TOTAL BILIRUBIN mg/dL 0.48   ALK PHOS U/L 54   ALT U/L 24   AST U/L 17   GLUCOSE RANDOM mg/dL 88                       Lines/Drains:  Invasive Devices       Peripheral Intravenous Line  Duration             Peripheral IV 02/09/24 Left Antecubital 1 day                          Imaging: No pertinent imaging reviewed.    Recent Cultures (last 7 days):         Last 24 Hours Medication List:   Current Facility-Administered Medications   Medication Dose Route Frequency Provider Last Rate    busPIRone  5 mg Oral BID Rogelio Mirza DO          Today, Patient Was Seen By: Rogelio Mirza DO    **Please Note: This note may have been constructed using a voice recognition system.**

## 2024-02-11 NOTE — PLAN OF CARE
Problem: PAIN - ADULT  Goal: Verbalizes/displays adequate comfort level or baseline comfort level  Description: Interventions:  - Encourage patient to monitor pain and request assistance  - Assess pain using appropriate pain scale  - Administer analgesics based on type and severity of pain and evaluate response  - Implement non-pharmacological measures as appropriate and evaluate response  - Notify physician/advanced practitioner if interventions unsuccessful or patient reports new pain  Outcome: Progressing     Problem: INFECTION - ADULT  Goal: Absence or prevention of progression during hospitalization  Description: INTERVENTIONS:  - Assess and monitor for signs and symptoms of infection  - Monitor lab/diagnostic results  - Monitor all insertion sites, i.e. indwelling lines, tubes, and drains  - [place] appropriate cooling/warming therapies per order  - Administer medications as ordered  - Instruct and encourage patient and family to use good hand hygiene technique  - Identify and instruct in appropriate isolation precautions for identified infection/condition  Outcome: Progressing     Problem: SAFETY ADULT  Goal: Patient will remain free of falls  Description: INTERVENTIONS:  - Educate patient/family on patient safety including physical limitations  - Instruct patient to call for assistance with activity   - Consult OT/PT to assist with strengthening/mobility   - Keep Call bell within reach  - Keep bed low and locked with side rails adjusted as appropriate  - Keep care items and personal belongings within reach  - Initiate and maintain comfort rounds  - Make Fall Risk Sign visible to staff  - Offer Toileting every 2 Hours, in advance of need  - Initiate/Maintain bed alarm  - Obtain necessary fall risk management equipment.  - Apply yellow socks and bracelet for high fall risk patients  - Consider moving patient to room near nurses station  Outcome: Progressing     Problem: DISCHARGE PLANNING  Goal: Discharge  to home or other facility with appropriate resources  Description: INTERVENTIONS:  - Identify barriers to discharge w/patient and caregiver  - Arrange for needed discharge resources and transportation as appropriate  - Identify discharge learning needs (meds, wound care, etc.)  - Refer to Case Management Department for coordinating discharge planning if the patient needs post-hospital services based on physician/advanced practitioner order or complex needs related to functional status, cognitive ability, or social support system  Outcome: Progressing     Problem: Knowledge Deficit  Goal: Patient/family/caregiver demonstrates understanding of disease process, treatment plan, medications, and discharge instructions  Description: Complete learning assessment and assess knowledge base.  Interventions:  - Provide teaching at level of understanding  - Provide teaching via preferred learning methods  Outcome: Progressing     Problem: NEUROSENSORY - ADULT  Goal: Achieves stable or improved neurological status  Description: INTERVENTIONS  - Monitor and report changes in neurological status  - Monitor vital signs such as temperature, blood pressure, glucose, and any other labs ordered   - Initiate measures to prevent increased intracranial pressure  - Monitor for seizure activity and implement precautions if appropriate      Outcome: Progressing  Goal: Remains free of injury related to seizures activity  Description: INTERVENTIONS  - Maintain airway, patient safety  and administer oxygen as ordered  - Monitor patient for seizure activity, document and report duration and description of seizure to physician/advanced practitioner  - If seizure occurs,  ensure patient safety during seizure  - Reorient patient post seizure  - Seizure pads on all 4 side rails  - Instruct patient/family to notify RN of any seizure activity including if an aura is experienced  - Instruct patient/family to call for assistance with activity based on  nursing assessment  - Administer anti-seizure medications if ordered    Outcome: Progressing     Problem: BEHAVIOR  Goal: Pt/Family maintain appropriate behavior and adhere to behavioral management agreement, if implemented  Description: INTERVENTIONS:  - Assess the family dynamic   - Encourage verbalization of thoughts and concerns in a socially appropriate manner  - Assess patient/family's coping skills and non-compliant behavior (including use of illegal substances).  - Utilize positive, consistent limit setting strategies supporting safety of patient, staff and others  - Initiate consult with Case Management, Spiritual Care or other ancillary services as appropriate  - If a patient's/visitor's behavior jeopardizes the safety of the patient, staff, or others, refer to organization procedure.   - Notify Security of behavior or suspected illegal substances which indicate the need for search of the patient and/or belongings  - Encourage participation in the decision making process about a behavioral management agreement; implement if patient meets criteria  Outcome: Progressing

## 2024-02-11 NOTE — CONSULTS
TeleConsultation - Behavioral Health   Ibrahima Adams 48 y.o. male MRN: [id number]  [facility] Encounter: [id number]        REQUIRED DOCUMENTATION:     1. This service was provided via Telemedicine.  [facility]. [facility]  3. TeleMed provider: Mildred Davis MD.  4. Identify all parties in room with patient during tele consult:  Patient  5.Patient was then informed that this was a Telemedicine visit and that the exam was being conducted confidentially over secure lines. My office door was closed. No one else was in the room.  Patient acknowledged consent and understanding of privacy and security of the Telemedicine visit, and gave us permission to have the assistant stay in the room in order to assist with the history and to conduct the exam.  I informed the patient that I have reviewed their record in Epic and presented the opportunity for them to ask any questions regarding the visit today.  The patient agreed to participate.       Assessment/Plan     Principal Problem:    Spells of abnormal behavior  Active Problems:    Anxiety disorder    Gastroesophageal reflux disease without esophagitis    Confusion    Substance use    Unintentional weight loss    Seizure-like activity (HCC)      Assessment  -Major Depressive d/o  -Adjustment d/o with anxious distress    Recommendations & Treatment Plan:    -No indication at this time for inpatient psychiatric hospitalization.  -Recommend adding on vitamin D level to labs  -After complete medical and neurological workup, if patient's presenting symptoms persist, it is plausible that it could be in part manifested or worsened by underlying depression and anxiety.  Patient reports several acute stressors and in the past benefited from psychotherapy.  -Would D/C BuSpar, and start Zoloft 25 mg daily for depression and anxiety, titrated to effect through outpatient provider. Discussed risks and benefits extensively with the patient including possible sexual side  effects.  Patient is agreeable to start.   -Strongly recommend linkage with outpatient psychiatry and psychotherapy.  -Findings and recommendations communicated to staff.      Findings and recommendations communicated to primary team/staff.    Current Medications:   Current Facility-Administered Medications   Medication Dose Route Frequency Provider Last Rate    busPIRone  5 mg Oral BID Rogelio Mirza DO      polyethylene glycol  17 g Oral Daily Rogelio Mirza DO      senna-docusate sodium  1 tablet Oral BID Rogelio Mirza DO         Risks / Benefits of Treatment:  Risks, benefits, and possible side effects of medications explained to patient and patient verbalizes understanding.      Inpatient consult to Psychiatry  Consult performed by: Mildred Davis MD  Consult ordered by: Rogelio Mirza DO        Physician Requesting Consult: Rogelio Mirza DO  Principal Problem:Spell of abnormal behavior    Reason for Consult:  abnormal behavior      History of Present Illness:  48-year-old   male with no formal past psychiatric history except history of opiate use in remission, currently on leave from work, admitted to the hospital due to spells of abnormal behavior, wherein patient reports several months of worsening intermittent confusion, repetitive behaviors and tics (swearing, throat clearing, hitting self and face).  Chart was reviewed and case was discussed with staff.  Patient was seen by telepsychiatry.  He was awake and alert.  He was initially accompanied by his wife at bedside however she stepped outside of the room and interview was conducted in privacy.  Patient was well related, calm, cooperative, forthcoming, maintained good eye contact, not appear to be acting in any bizarre abnormal fashion.  Per nursing patient has been fairly calm and without abnormal behavior during hospital stay.  Patient reports that the last time he was exhibiting the symptoms and signs were as before the hospital.  He feels  that while he is here, he feels a little bit less stressed, whereas he was having significant stressors prior.  Reports that his stressors include financial primarily, and otherwise is doing okay including in his relationship.  States that since last summer he has been having these symptoms amidst being under stress, having decreased income due to less money coming in from employment.  He feels that he is 48 and his wife is 11 years younger than him, and he is ready to have kids and to buy a house however he is facing roadblocks and obstacles due to the finances.  He feels depressed and unable to find motivation to work towards resolving his obstacles.  He reports frequent anhedonia, low energy, psychomotor retardation, disturbances in sleep and appetite, and irritability.  Patient reports that he is also hopeless at times, but other times he is able to look forward.  He denies any current or past suicidal ideations or homicidal ideations.  Patient denies any history of manic or psychotic symptoms including auditory or visual hallucinations or paranoia.  Patient denies current substance abuse.  Patient reports that he was previously in psychotherapy for a few sessions through his work and found it very helpful in reducing his stress levels and he would be interested in starting again if he got a referral.  Discussed medication, and patient states he has been on BuSpar for a few weeks has not found it helpful, and is interested to try something else.        Psychiatric Review Of Systems:  Negative except as reported or endorsed in HPI    Historical Information     Past Psychiatric History:     Psychiatric Hospitalizations:   No history of past inpatient psychiatric admissions  Outpatient Treatment History:   past treatment with therapist (Therapist with outside practice)  Suicide Attempts:   None  History of self-harm:   None  Violence History:   no  Current Psychotropic regimen:Buspar 5mg bid  Past Psychiatric  medication trials: none    Substance Abuse History:  Prior history of fentanyl use, last was a few years ago, stopped after his overdose.  History of infrequent alcohol and cannabis use.    Family Psychiatric History:    None reported      Education:   Family History   Problem Relation Age of Onset    Cirrhosis Mother     Cholelithiasis Mother     Diabetes type II Father     Stroke Maternal Grandfather     Diabetes Paternal Grandfather        Social History:  Marital status:   Children: None, but is hoping to have children  Living arrangement: Has housing, is hoping to buy a house  Support system: [employer]  Occupational History:  by [employer], currently on FMLA and short term disability  Functioning Relationships: good relationship with spouse or significant other.          Past Medical History:   Diagnosis Date    Chronic back pain        Medical Review Of Systems:    Review of Systems    Meds/Allergies     all current active meds have been reviewed  No Known Allergies    Objective     Vital signs in last 24 hours:  Temp:  [98.3 °F (36.8 °C)-98.8 °F (37.1 °C)] 98.8 °F (37.1 °C)  HR:  [56-68] 65  Resp:  [16-20] 16  BP: (128-149)/(75-90) 149/90    No intake or output data in the 24 hours ending 02/11/24 6191    Mental Status Evaluation:    Appearance:  age appropriate   Behavior:  normal, well related, calm, cooperative   Speech:  normal pitch and normal volume   Mood:  depressed   Affect:  Euthymic, mildly constricted   Thought Process:  normal   Associations intact associations   Thought Content:  normal   Perceptual Disturbances: None   Risk Potential: Suicidal Ideations none  Homicidal Ideations none   Sensorium:  person, place, and situation   Cognition:  recent and remote memory grossly intact   Consciousness:  alert and awake    Attention: attention span and concentration were age appropriate   Insight:  age appropriate   Judgment: age appropriate       Lab Results: I have personally reviewed all  pertinent laboratory/tests results.     Most Recent Labs: @RESUFAST(WBC,RBC,HGB,HCT,PLT, RBC,RDW,NEUTROABS,SODIUM,K,CL,CO2,BUN,CREATININE,GLUC,GLUF,CALCIUM,AST,ALT,ALKPHOS,TP,ALB,TBILI,CHOLESTEROL,HDL,TRIG,LDLCALC,NONHDLC,VALPROICTOT,CARBAMAZEPIN,LITHIUM,AMMONIA,JKQ6OTVXARAM,FREET4,T3FREE,EXTPREGUR,PREGSERUM,HCG,HCGQUANT,RPR,HGBA1C,EAG)@    Imaging Studies: EEG awake or drowsy routine    Result Date: 2024  Narrative: Table formatting from the original result was not included. ELECTROENCEPHALOGRAM Patient Name:  Ibrahima Adams  MRN: [id number] :  1975 File #: [id number]  Date performed: 2024  Referring Provider: Shemar Herbert DO       Report date: 2024      Study type: awake and drowsy EEG ICD 10 diagnosis: Transient alteration of awareness R40.4 Patient History: EEG is requested to assess for seizures and/or classification of epilepsy. Patient is 48 y.o. male presenting to hospital for losing track of time.  He was having repetitive behavior.  His wife reported episodes of smacking of face with episodes of screaming. Current AEDs: Medications include: Facility-Administered Medications Ordered in Other Visits Medication Dose Route Frequency Provider Last Rate  busPIRone  5 mg Oral BID Rogelio Mirza DO    polyethylene glycol  17 g Oral Daily Rogelio Mirza DO    senna-docusate sodium  1 tablet Oral BID Rogelio Mirza DO   Description of Procedure: The EEG was performed with electrodes applied using the International 10-20 System.  Additional electrodes used included EOG, ECG and T1/T2 electrodes.  A single lead ECG channel is also present.  At least 16 channels are reviewed at a time and formatted into longitudinal bipolar, transverse bipolar, and referential (to common reference or calculated common reference) montages.   The EEG was recorded with the patient awake, drowsy, and asleep.  The recording was technically satisfactory. EEG was recorded from 10:21 to 10:47. Findings: Background  Activity: The background is grossly symmetric with respect to voltages and activities. During wakefulness, the background is well-organized with anterior very low amplitude beta activity and posterior low-moderate amplitude alpha activity.  There is a symmetric 9-9.5 Hz posterior dominant rhythm that attenuates with eye opening.  Drowsiness is characterized by roving eye movements, attenuation of the posterior dominant rhythm, prominent anterior beta activity, central theta activity, positive occipital sharp transients of sleep (POSTS), and vertex waves. Brief stage 2 sleep is characterized by symmetric sleep spindles and K-complexes. Activation Procedures: Hyperventilation was performed with good effort up to 4 minutes and did not produce any abnormalities. Stepped photic stimulation from 1 to 30 fps was performed and produced no abnormality. Other findings: The single lead ECG shows a regular and sinus rhythm. Interpretation: This is a normal 26 minutes awake, drowsy, and asleep EEG. Roger Medina MD PhD [facility]      CT chest abdomen pelvis w contrast    Result Date: 2/10/2024  Narrative: CT CHEST, ABDOMEN AND PELVIS WITH IV CONTRAST INDICATION: Occult malignancy r/o occult malignancy. Based on Dr. Haney's note from 2/9/2024, patient has spells of intermittent confusion, repetitive behavior, and tics. Patient also has unintentional weight loss. COMPARISON: Chest, abdomen, pelvic CT from 8/2/2021. TECHNIQUE: CT examination of the chest, abdomen and pelvis was performed. Multiplanar 2D reformatted images were created from the source data. This examination, like all CT scans performed in the [facility], was performed utilizing techniques to minimize radiation dose exposure, including the use of iterative reconstruction and automated exposure control. Radiation dose length product (DLP) for this visit: 678.49 mGy-cm IV Contrast: 50 mL of iohexol  (OMNIPAQUE) 100 mL of iohexol (OMNIPAQUE) Enteric Contrast: Administered. FINDINGS: CHEST LUNGS: Unchanged small intraparenchymal lymph node along the right major fissure (series 4 image 68.) No suspicious pulmonary nodules. No tracheal or endobronchial lesion. PLEURA: Unremarkable. HEART/GREAT VESSELS: Heart is unremarkable for patient's age. No thoracic aortic aneurysm. MEDIASTINUM AND NARAYAN: Unremarkable. CHEST WALL AND LOWER NECK: Unremarkable. ABDOMEN LIVER/BILIARY TREE: Tiny hypodensity in the inferior right lobe of the liver (series 301 image 138.) This has been stable since 2021 and therefore benign. No suspicious liver lesions. GALLBLADDER: No calcified gallstones. No pericholecystic inflammatory change. SPLEEN: Unremarkable. PANCREAS: Unremarkable. ADRENAL GLANDS: Unremarkable. KIDNEYS/URETERS: Unremarkable. No hydronephrosis. STOMACH AND BOWEL: Unremarkable. APPENDIX: No findings to suggest appendicitis. ABDOMINOPELVIC CAVITY: No ascites. No pneumoperitoneum. No lymphadenopathy. VESSELS: Unremarkable for patient's age. PELVIS REPRODUCTIVE ORGANS: Unremarkable for patient's age. URINARY BLADDER: Unremarkable. ABDOMINAL WALL/INGUINAL REGIONS: Unremarkable. BONES: No acute fracture or suspicious osseous lesion.     Impression: No evidence of malignancy in the chest, abdomen, and pelvis. Workstation performed: [id number]     MRI brain w wo contrast    Result Date: 2/10/2024  Narrative: MRI BRAIN WITH AND WITHOUT CONTRAST INDICATION: tic disorder versus seizure. COMPARISON: CT from earlier the same date. TECHNIQUE: Multiplanar, multisequence imaging of the brain was performed before and after gadolinium administration. IV Contrast:  8 mL of Gadobutrol injection (SINGLE-DOSE) IMAGE QUALITY:   Diagnostic. FINDINGS: BRAIN PARENCHYMA:  There is no discrete mass, mass effect or midline shift. There is no intracranial hemorrhage.  Normal posterior fossa.  Diffusion imaging is unremarkable. There are no white matter  changes in the cerebral hemispheres. Postcontrast imaging of the brain demonstrates no abnormal enhancement. VENTRICLES:  Normal for the patient's age. SELLA AND PITUITARY GLAND:  Normal. ORBITS:  Normal. PARANASAL SINUSES:  Normal. VASCULATURE:  Evaluation of the major intracranial vasculature demonstrates appropriate flow voids. CALVARIUM AND SKULL BASE:  Normal. EXTRACRANIAL SOFT TISSUES:  Normal.     Impression: No intracranial pathology. Workstation performed: [id number]     CT head wo contrast    Result Date: 2/9/2024  Narrative: CT BRAIN - WITHOUT CONTRAST INDICATION:   seizure-like activity. COMPARISON: 8/2/2021. TECHNIQUE:  CT examination of the brain was performed.  Multiplanar 2D reformatted images were created from the source data. Radiation dose length product (DLP) for this visit:  1568.6 mGy-cm .  This examination, like all CT scans performed in the [facility], was performed utilizing techniques to minimize radiation dose exposure, including the use of iterative  reconstruction and automated exposure control. IMAGE QUALITY:  Diagnostic. FINDINGS: PARENCHYMA:  No intracranial mass, mass effect or midline shift. No CT signs of acute infarction.  No acute parenchymal hemorrhage. VENTRICLES AND EXTRA-AXIAL SPACES:  Normal for the patient's age. VISUALIZED ORBITS: Normal visualized orbits. PARANASAL SINUSES: Normal visualized paranasal sinuses. CALVARIUM AND EXTRACRANIAL SOFT TISSUES:  Normal.     Impression: No acute intracranial abnormality. Workstation performed: [id number]     EKG/Pathology/Other Studies:   Lab Results   Component Value Date    VENTRATE 88 02/09/2024    ATRIALRATE 88 02/09/2024    PRINT 142 02/09/2024    QRSDINT 90 02/09/2024    QTINT 356 02/09/2024    QTCINT 430 02/09/2024    PAXIS 73 02/09/2024    QRSAXIS 55 02/09/2024    TWAVEAXIS 33 02/09/2024        Code Status: Prior  Advance Directive and Living Will:      Power of :    POLST:       Screenings:    1. Nutrition Screening  Nutrition Assessment (completed by Staff): Nutrition  Feeding: Able to feed self  Diet Type: Regular/House  Appetite: Good  Nutrition Screen: Nutrition Screen  Have you lost weight recently without trying?: (!) Yes  How much weight (pounds) have you lost?: (!) 24 to 33 lbs  Have you been eating poorly because of a decreased appetite?: No  MST SCORE: 3  Stage III-IV pressure ulcer or non-healing wound?: No  Home tube feeding or total parenteral nutrition (TPN)?: No  Appearance of muscle wasting or fat loss?: No  Patient currently on hemodialysis or peritoneal dialysis? : No   Nutrition Assessment:  Nursing Nutrition Screen  Have you lost weight recently without trying?: (!) Yes  How much weight (pounds) have you lost?: (!) 24 to 33 lbs  Have you been eating poorly because of a decreased appetite?: No  MST SCORE: 3  Stage III-IV pressure ulcer or non-healing wound?: No  Home tube feeding or total parenteral nutrition (TPN)?: No  Appearance of muscle wasting or fat loss?: No    2. Pain Screening  Pain Screening: Pain Assessment  Pain Assessment Tool: 0-10  Pain Score: 0    3. Suicide Screening  ED Crisis Suicide Risk Assessment:      C-SSRS Screening (Nursing Assessment - recent):    C-SSRS Screening (Nursing Assessment - since last contact):                                                             [place]-SUICIDE SEVERITY RATING SCALE (C-SSRS)                            1. In the last month have you wished you were dead or wished you could go to sleep and not wake up? No  2. In the last month, have you actually had thoughts about killing yourself? No  3. Have you been thinking about how you might do this? No  4. Have you had these thoughts and had some intention of acting on them? No  6. Have you done anything, started to do anything, or prepared to do anything to end your life in the last 3 months? No  Suicide Risk Level : Low    Counseling / Coordination of  Care:    Total floor / unit time spent today 50 minutes. Greater than 50% of total time was spent with the patient and / or family counseling and / or coordination of care. A description of the counseling / coordination of care: Direct Patient Care, Chart Review, and Documentation     Disclaimer: Portions of this note may have been generated by a front end voice activated word recognition program. There may be typographical grammar or word substitution errors generated by the program or my typing skills in this note that may have been escaped my editorial review.

## 2024-02-12 VITALS
RESPIRATION RATE: 17 BRPM | TEMPERATURE: 99 F | HEIGHT: 72 IN | DIASTOLIC BLOOD PRESSURE: 91 MMHG | OXYGEN SATURATION: 98 % | HEART RATE: 64 BPM | SYSTOLIC BLOOD PRESSURE: 161 MMHG | BODY MASS INDEX: 24.38 KG/M2 | WEIGHT: 180 LBS

## 2024-02-12 LAB
ANION GAP SERPL CALCULATED.3IONS-SCNC: 9 MMOL/L
BUN SERPL-MCNC: 10 MG/DL (ref 5–25)
CALCIUM SERPL-MCNC: 9.5 MG/DL (ref 8.4–10.2)
CHLORIDE SERPL-SCNC: 107 MMOL/L (ref 96–108)
CO2 SERPL-SCNC: 26 MMOL/L (ref 21–32)
CREAT SERPL-MCNC: 0.94 MG/DL (ref 0.6–1.3)
GFR SERPL CREATININE-BSD FRML MDRD: 96 ML/MIN/1.73SQ M
GLUCOSE SERPL-MCNC: 98 MG/DL (ref 65–140)
MAGNESIUM SERPL-MCNC: 1.8 MG/DL (ref 1.9–2.7)
POTASSIUM SERPL-SCNC: 3.7 MMOL/L (ref 3.5–5.3)
SODIUM SERPL-SCNC: 142 MMOL/L (ref 135–147)

## 2024-02-12 PROCEDURE — 99239 HOSP IP/OBS DSCHRG MGMT >30: CPT | Performed by: INTERNAL MEDICINE

## 2024-02-12 PROCEDURE — 80048 BASIC METABOLIC PNL TOTAL CA: CPT | Performed by: INTERNAL MEDICINE

## 2024-02-12 PROCEDURE — 83735 ASSAY OF MAGNESIUM: CPT | Performed by: INTERNAL MEDICINE

## 2024-02-12 RX ORDER — SERTRALINE HYDROCHLORIDE 25 MG/1
25 TABLET, FILM COATED ORAL DAILY
Status: DISCONTINUED | OUTPATIENT
Start: 2024-02-12 | End: 2024-02-12 | Stop reason: HOSPADM

## 2024-02-12 RX ORDER — MAGNESIUM SULFATE HEPTAHYDRATE 40 MG/ML
2 INJECTION, SOLUTION INTRAVENOUS ONCE
Status: DISCONTINUED | OUTPATIENT
Start: 2024-02-12 | End: 2024-02-12 | Stop reason: HOSPADM

## 2024-02-12 RX ORDER — SERTRALINE HYDROCHLORIDE 25 MG/1
25 TABLET, FILM COATED ORAL DAILY
Qty: 30 TABLET | Refills: 0 | Status: SHIPPED | OUTPATIENT
Start: 2024-02-12

## 2024-02-12 RX ADMIN — SERTRALINE HYDROCHLORIDE 25 MG: 25 TABLET ORAL at 08:35

## 2024-02-12 NOTE — UTILIZATION REVIEW
NOTIFICATION OF ADMISSION DISCHARGE   This is a Notification of Discharge from [facility]. Please be advised that this patient has been discharge from our facility. Below you will find the admission and discharge date and time including the patient’s disposition.   UTILIZATION REVIEW CONTACT:  Michael Chicas  Utilization   Network Utilization Review Department  Phone: [phone] x carefully listen to the prompts. All voicemails are confidential.  Email: [email or web address]     ADMISSION INFORMATION  PRESENTATION DATE: 2/9/2024  1:11 PM  OBERVATION ADMISSION DATE:   INPATIENT ADMISSION DATE: 2/9/24  3:24 PM   DISCHARGE DATE: 2/12/2024  9:30 AM   DISPOSITION:Home/Self Care    Network Utilization Review Department  ATTENTION: Please call with any questions or concerns to [phone] and carefully listen to the prompts so that you are directed to the right person. All voicemails are confidential.   For Discharge needs, contact Care Management DC Support Team at [phone] opt. 2  Send all requests for admission clinical reviews, approved or denied determinations and any other requests to dedicated fax number below belonging to the campus where the patient is receiving treatment. List of dedicated fax numbers for the Facilities:  FACILITY NAME UR FAX NUMBER   ADMISSION DENIALS (Administrative/Medical Necessity) [phone]   DISCHARGE SUPPORT TEAM ([facility]) [phone]   PARENT CHILD HEALTH (Maternity/NICU/Pediatrics) [phone]   [facility] [phone]   [facility] [phone]   [facility] [phone]   [facility] [phone]   [facility] [phone]   [facility] [phone]   [facility] [phone]   [facility] [phone]   [facility]  [facility] [phone]   [facility] [phone]   [facility] [phone]   [facility] [phone]

## 2024-02-12 NOTE — ASSESSMENT & PLAN NOTE
Patient was evaluated by psychiatry  Major depression/adjustment disorder with anxious distress  Patient was taken off BuSpar and started on Zoloft

## 2024-02-12 NOTE — PLAN OF CARE
Problem: PAIN - ADULT  Goal: Verbalizes/displays adequate comfort level or baseline comfort level  Description: Interventions:  - Encourage patient to monitor pain and request assistance  - Assess pain using appropriate pain scale  - Administer analgesics based on type and severity of pain and evaluate response  - Implement non-pharmacological measures as appropriate and evaluate response  - Notify physician/advanced practitioner if interventions unsuccessful or patient reports new pain  Outcome: Completed     Problem: INFECTION - ADULT  Goal: Absence or prevention of progression during hospitalization  Description: INTERVENTIONS:  - Assess and monitor for signs and symptoms of infection  - Monitor lab/diagnostic results  - Monitor all insertion sites, i.e. indwelling lines, tubes, and drains  - [place] appropriate cooling/warming therapies per order  - Administer medications as ordered  - Instruct and encourage patient and family to use good hand hygiene technique  - Identify and instruct in appropriate isolation precautions for identified infection/condition  Outcome: Completed     Problem: SAFETY ADULT  Goal: Patient will remain free of falls  Description: INTERVENTIONS:  - Educate patient/family on patient safety including physical limitations  - Instruct patient to call for assistance with activity   - Consult OT/PT to assist with strengthening/mobility   - Keep Call bell within reach  - Keep bed low and locked with side rails adjusted as appropriate  - Keep care items and personal belongings within reach  - Initiate and maintain comfort rounds  - Make Fall Risk Sign visible to staff  - Offer Toileting every 2 Hours, in advance of need  - Initiate/Maintain bed alarm  - Obtain necessary fall risk management equipment.  - Apply yellow socks and bracelet for high fall risk patients  - Consider moving patient to room near nurses station  Outcome: Completed     Problem: DISCHARGE PLANNING  Goal: Discharge to  home or other facility with appropriate resources  Description: INTERVENTIONS:  - Identify barriers to discharge w/patient and caregiver  - Arrange for needed discharge resources and transportation as appropriate  - Identify discharge learning needs (meds, wound care, etc.)  - Refer to Case Management Department for coordinating discharge planning if the patient needs post-hospital services based on physician/advanced practitioner order or complex needs related to functional status, cognitive ability, or social support system  Outcome: Completed     Problem: Knowledge Deficit  Goal: Patient/family/caregiver demonstrates understanding of disease process, treatment plan, medications, and discharge instructions  Description: Complete learning assessment and assess knowledge base.  Interventions:  - Provide teaching at level of understanding  - Provide teaching via preferred learning methods  Outcome: Completed     Problem: NEUROSENSORY - ADULT  Goal: Achieves stable or improved neurological status  Description: INTERVENTIONS  - Monitor and report changes in neurological status  - Monitor vital signs such as temperature, blood pressure, glucose, and any other labs ordered   - Initiate measures to prevent increased intracranial pressure  - Monitor for seizure activity and implement precautions if appropriate      Outcome: Completed  Goal: Remains free of injury related to seizures activity  Description: INTERVENTIONS  - Maintain airway, patient safety  and administer oxygen as ordered  - Monitor patient for seizure activity, document and report duration and description of seizure to physician/advanced practitioner  - If seizure occurs,  ensure patient safety during seizure  - Reorient patient post seizure  - Seizure pads on all 4 side rails  - Instruct patient/family to notify RN of any seizure activity including if an aura is experienced  - Instruct patient/family to call for assistance with activity based on nursing  assessment  - Administer anti-seizure medications if ordered    Outcome: Completed     Problem: BEHAVIOR  Goal: Pt/Family maintain appropriate behavior and adhere to behavioral management agreement, if implemented  Description: INTERVENTIONS:  - Assess the family dynamic   - Encourage verbalization of thoughts and concerns in a socially appropriate manner  - Assess patient/family's coping skills and non-compliant behavior (including use of illegal substances).  - Utilize positive, consistent limit setting strategies supporting safety of patient, staff and others  - Initiate consult with Case Management, Spiritual Care or other ancillary services as appropriate  - If a patient's/visitor's behavior jeopardizes the safety of the patient, staff, or others, refer to organization procedure.   - Notify Security of behavior or suspected illegal substances which indicate the need for search of the patient and/or belongings  - Encourage participation in the decision making process about a behavioral management agreement; implement if patient meets criteria  Outcome: Completed

## 2024-02-12 NOTE — DISCHARGE SUMMARY
[facility]  Discharge- Ibrahima Adams 1975, 48 y.o. male MRN: [id number]  Unit/Bed#: [facility] 709-01 Encounter: [id number]  Primary Care Provider: Charles Rice DO   Date and time admitted to hospital: 2/9/2024  1:11 PM    * Spells of abnormal behavior  Assessment & Plan  Presenting with ongoing chronic symptoms of worsening intermittent confusion, repetitive behavior and tics.  Also reported weight loss  Patient evaluated by neurology , differential diagnosis seizure, encephalitis, stroke, malignancy, paraneoplastic syndrome, primary psychiatric disorder or metabolic   Brain MRI is negative for acute abnormality  Chest abdomen pelvis CT scan without evidence of malignancy  Normal homocysteine level  Negative UDS  Previous workup  including HIV and Lyme titer negative  Normal TSH  Follow on MMA,  ceruloplasmin, heavy metal and copper level  Normal EEG  Patient was evaluated by psychiatric, taking off BuSpar and started on Zoloft with recommendation for urgent psychiatry and psychotherapy  Stable for discharge home today    Unintentional weight loss  Assessment & Plan  Patient reports a 30 pound weight loss over the past year  Chest abdomen pelvis CT scan negative for malignancy   Continue with above workup      Substance use  Assessment & Plan  With prior history of snorting Percocet noted in prior record.  Continue with supportive care.  Negative UDS    Anxiety disorder  Assessment & Plan  Patient was evaluated by psychiatry  Major depression/adjustment disorder with anxious distress  Patient was taken off BuSpar and started on Zoloft        Medical Problems       Resolved Problems  Date Reviewed: 2/12/2024   None       Discharging Physician / Practitioner: Rogelio Mirza DO  PCP: Charles Rice DO  Admission Date:   Admission Orders (From admission, onward)       Ordered        02/09/24 1524  INPATIENT ADMISSION  Once                          Discharge Date:  02/12/24    Consultations During Hospital Stay:  Neurology  Psychiatry    Procedures Performed:   Head CT scan no abnormality  Chest abdomen vesicant without acute abnormality   Brain MRI without acute abnormality  Normal EEG    Significant Findings / Test Results:   none    Incidental Findings:   none    Test Results Pending at Discharge (will require follow up):   Heavy metal screen, copper and MMA     Outpatient Tests Requested:  VIT D    Complications:  none    Reason for Admission:   Spells of apnea review    Hospital Course:   Ibrahima Adams is a 48 y.o. male patient who originally presented to the hospital on 2/9/2024 due to episodes of Abnormal behavior    Patient with underlying history of anxiety, prior substance abuse  Patient was evaluated by neurology and psychiatry  Workup so far is negative, per psychiatry suspect symptoms are worsened by underlying depression and elevated    Patient was taken off BuSpar and started on Zoloft with a plan for outpatient psychiatry and psychotherapy    Stable for discharge home today    Please see above list of diagnoses and related plan for additional information.     Condition at Discharge: stable    Discharge Day Visit / Exam:   Subjective:    Patient seen and examined  Comfortable in bed  No event overnight  No complaints  Vitals: Blood Pressure: 161/91 (02/12/24 0723)  Pulse: 64 (02/12/24 0723)  Temperature: 99 °F (37.2 °C) (02/12/24 0723)  Temp Source: Oral (02/11/24 1416)  Respirations: 17 (02/12/24 0723)  Height: 6' (182.9 cm) (02/09/24 1307)  Weight - Scale: 81.6 kg (180 lb) (02/09/24 1307)  SpO2: 98 % (02/12/24 0723)  Exam:   Physical Exam   Patient is awake alert oriented no acute distress   Comfortable in bed  Lungs clear to auscultation bilateral  Heart positive S1-S2 no murmur   Abdomen soft nontender  Lower extremities no edema    Discussion with Family: Updated  (wife) at bedside.    Discharge instructions/Information to patient and family:    See after visit summary for information provided to patient and family.      Provisions for Follow-Up Care:  See after visit summary for information related to follow-up care and any pertinent home health orders.      Mobility at time of Discharge:   Basic Mobility Inpatient Raw Score: 24  JH-HLM Goal: 8: Walk 250 feet or more  JH-HLM Achieved: 8: Walk 250 feet ot more  HLM Goal achieved. Continue to encourage appropriate mobility.     Disposition:   Home    Planned Readmission: no     Discharge Statement:  I spent 35 minutes discharging the patient. This time was spent on the day of discharge. I had direct contact with the patient on the day of discharge. Greater than 50% of the total time was spent examining patient, answering all patient questions, arranging and discussing plan of care with patient as well as directly providing post-discharge instructions.  Additional time then spent on discharge activities.    Discharge Medications:  See after visit summary for reconciled discharge medications provided to patient and/or family.      **Please Note: This note may have been constructed using a voice recognition system**

## 2024-02-12 NOTE — CASE MANAGEMENT
Case Management Discharge Planning Note    Patient name Ibrahima Adams  Location [facility] 709/[facility] 709-01 MRN [id number]  : 1975 Date 2024       Current Admission Date: 2024  Current Admission Diagnosis:Spells of abnormal behavior   Patient Active Problem List    Diagnosis Date Noted    Seizure-like activity (HCC) 2024    Confusion 2024    Substance use 2024    Spells of abnormal behavior 2024    Unintentional weight loss 2024    Transient tics 2024    Anxiety disorder 2024    Gastroesophageal reflux disease without esophagitis 2024    Cyclical vomiting 2024    Transient amnesia 2024    Personal history of COVID-19 2024    Encounter for annual physical exam 2024      LOS (days): 3  Geometric Mean LOS (GMLOS) (days):   Days to GMLOS:     OBJECTIVE:  Risk of Unplanned Readmission Score: 9.93         Current admission status: Inpatient   Preferred Pharmacy:   [facility]  AGUILAR Jacobs [address]  [address] [place] [address]  [place] [address]  Phone: [phone] Fax: [phone]    Primary Care Provider: Charles Rice DO    Primary Insurance: BLUE CROSS  Secondary Insurance:     DISCHARGE DETAILS:    Discharge planning discussed with:: Patient and spouse at the bedside           Were Treatment Team discharge recommendations reviewed with patient/caregiver?: Yes  Did patient/caregiver verbalize understanding of patient care needs?: Yes  Were patient/caregiver advised of the risks associated with not following Treatment Team discharge recommendations?: Yes         Treatment Team Recommendation: Home  Discharge Destination Plan:: Home  Transport at Discharge : Family           ETA of Transport (Date): 24  ETA of Transport (Time): 0850     Transfer Mode: Self  Accompanied by: Significant other      Patient will discharge home self care. No additional CM needs.

## 2024-02-12 NOTE — ASSESSMENT & PLAN NOTE
Presenting with ongoing chronic symptoms of worsening intermittent confusion, repetitive behavior and tics.  Also reported weight loss  Patient evaluated by neurology , differential diagnosis seizure, encephalitis, stroke, malignancy, paraneoplastic syndrome, primary psychiatric disorder or metabolic   Brain MRI is negative for acute abnormality  Chest abdomen pelvis CT scan without evidence of malignancy  Normal homocysteine level  Negative UDS  Previous workup  including HIV and Lyme titer negative  Normal TSH  Follow on MMA,  ceruloplasmin, heavy metal and copper level  Normal EEG  Patient was evaluated by psychiatric, taking off BuSpar and started on Zoloft with recommendation for urgent psychiatry and psychotherapy  Stable for discharge home today

## 2024-02-12 NOTE — PLAN OF CARE
Problem: SAFETY ADULT  Goal: Patient will remain free of falls  Description: INTERVENTIONS:  - Educate patient/family on patient safety including physical limitations  - Instruct patient to call for assistance with activity   - Consult OT/PT to assist with strengthening/mobility   - Keep Call bell within reach  - Keep bed low and locked with side rails adjusted as appropriate  - Keep care items and personal belongings within reach  - Initiate and maintain comfort rounds  - Make Fall Risk Sign visible to staff  - Offer Toileting every 2 Hours, in advance of need  - Initiate/Maintain bed alarm  - Obtain necessary fall risk management equipment.  - Apply yellow socks and bracelet for high fall risk patients  - Consider moving patient to room near nurses station  Outcome: Progressing

## 2024-02-15 ENCOUNTER — TRANSITIONAL CARE MANAGEMENT (OUTPATIENT)
Dept: FAMILY MEDICINE CLINIC | Facility: CLINIC | Age: 49
End: 2024-02-15

## 2024-02-16 ENCOUNTER — TELEPHONE (OUTPATIENT)
Dept: FAMILY MEDICINE CLINIC | Facility: CLINIC | Age: 49
End: 2024-02-16

## 2024-02-16 DIAGNOSIS — F41.9 ANXIETY DISORDER, UNSPECIFIED TYPE: Primary | ICD-10-CM

## 2024-02-16 LAB
COPPER SERPL-MCNC: 95 UG/DL (ref 69–132)
METHYLMALONATE SERPL-SCNC: 237 NMOL/L (ref 0–378)

## 2024-02-16 RX ORDER — LORAZEPAM 0.5 MG/1
0.5 TABLET ORAL EVERY 8 HOURS PRN
Qty: 30 TABLET | Refills: 0 | Status: SHIPPED | OUTPATIENT
Start: 2024-02-16

## 2024-02-16 NOTE — TELEPHONE ENCOUNTER
Called and spoke with the patient's significant other recapping his hospitalization and testing that was performed as well as consultations.  Patient was seen inpatient by neurology as well as a virtual visit with on-call psychiatrist.  MRI of the brain with and without contrast did not show any abnormality, EEG was also unremarkable for epileptiform activity.  She states that he had received IV magnesium while in the hospital and thinks that that may have calm him down.  He was prescribed sertraline 25 mg.  While he was hospitalized he did not experience any of these tic-like episodes.  She states that he has been taking sertraline.  He did take it in the evening but recently has been having episodes of verbal tics as well as anxiety.  She states that last night was not a very good night with getting any type of rest.      fypio message was sent by psychiatry for scheduling the patient (which is a poor way of scheduling).  I sent a message to the psychiatric  for her to call the patient's significant other to schedule an intake appointment.  I would like for them to continue on sertraline 25 mg once daily and I will prescribe a limited supply of lorazepam 0.5 mg.  Hopefully psychiatry can get him in to be seen in a realistic fashion otherwise he may require psychiatric hospitalization which would have to be done at [facility].    ----- Message from Hakeem Vargas sent at 2/16/2024  8:27 AM EST -----  Regarding: FW: Jb’s Episodes Continue After Hospital Stay  Contact: [phone]    ----- Message -----  From: Yanira Yañez  Sent: 2/16/2024   7:35 AM EST  To: [facility]  Subject: FW: Jb’s Episodes Continue After Hospital S#      ----- Message -----  From: Ibrahima Adams  Sent: 2/16/2024  12:18 AM EST  To: [facility]  Subject: Jb’s Episodes Continue After Hospital Stay      Hi Dr. Rice,  This is Jb’s wife, Morena. I’m sending you a message on his behalf.  Jb’s episodes continued after we last spoke. Jb was in the [facility] ER last Friday, 2/9, then I took him to the [place] ER where they admitted him in the hospital and performed all the tests you referred him for. Jb didn’t have any episodes during his stay. The tests came back fine, and the psychiatrist he spoke with at the hospital prescribed him Zoloft. His episodes started again after we got home from the hospital. They are happening daily and he’s been throwing up still so I’m not sure how much of the medicine is actually staying in him, if any. He has Tourettes-like behavior. He’s been swearing & shouting random phrases continuously for at least 9 hours now. He can’t stop or calm down. Can you please call me on my (Morena’s) cell when you can to discuss if he needs a sedative or something else until the Zoloft takes effect? Or if he needs something stronger? [phone]

## 2024-02-19 LAB
ARSENIC BLD-MCNC: 3 UG/L (ref 0–9)
CADMIUM BLD-MCNC: <0.5 UG/L (ref 0–1.2)
LEAD BLD-MCNC: <1 UG/DL (ref 0–3.4)
MERCURY BLD-MCNC: 1.1 UG/L (ref 0–14.9)

## 2024-02-22 ENCOUNTER — TELEPHONE (OUTPATIENT)
Dept: PSYCHIATRY | Facility: CLINIC | Age: 49
End: 2024-02-22

## 2024-02-22 NOTE — TELEPHONE ENCOUNTER
"Behavioral Health Outpatient Intake Questions    Referred By   : Referral     Please advise interviewee that they need to answer all questions truthfully to allow for best care, and any misrepresentations of information may affect their ability to be seen at this clinic   => Was this discussed? Yes     If Minor Child (under age 18)    Who is/are the legal guardian(s) of the child?     Is there a custody agreement? No     If \"YES\"- Custody orders must be obtained prior to scheduling the first appointment  In addition, Consent to Treatment must be signed by all legal guardians prior to scheduling the first appointment    If \"NO\"- Consent to Treatment must be signed by all legal guardians prior to scheduling the first appointment    Behavioral Health Outpatient Intake History -     Presenting Problem (in patient's own words):     Pt stated that he has disoriented ticks ( rubbing faces and noises, constant clearing throat.) No sleep for a couple days when these episodes are happening.  Recent hospital stays. Neuro test were fine, Stress anxiety past year. Episodes are happening daily.      Pt stated that he will have episodes where he is doing things and doesn't even realize he is  doing them. Examples trying to charge his phone with a pair of shorts and putting water in a  in the middle of the night.     Pt stated that he has just recently started seeing doctors.     Are there any communication barriers for this patient?     No                                               If yes, please describe barriers:  NONE   If there is a unique situation, please refer to Alcon Chowdhury/Leni Palmer for final determination.    Are you taking any psychiatric medications? Yes     If \"YES\" -What are they Zoloft     If \"YES\" -Who prescribes? Psych doctor in [facility]- PCP      Has the Patient previously received outpatient Talk Therapy or Medication Management from [facility]'s  No        If \"YES\"- When, Where and with " "Whom?         If \"NO\" -Has Patient received these services elsewhere?       If \"YES\" -When, Where, and with Whom?    Has the Patient abused alcohol or other substances in the last 6 months ? No  No concerns of substance abuse are reported.     If \"YES\" -What substance, How much, How often?     If illegal substance: Refer to [facility] (for [facility]) or [facility] Offices.   If Alcohol in excess of 10 drinks per week:  Refer to [facility] (for [facility]) or [facility] Offices    Legal History-     Is this treatment court ordered? No   If \"yes \"send to :  Talk Therapy : Send to Alcon Chowdhury/Leni Palmer for final determination   Med Management: Send to Dr Brantley for final determination     Has the Patient been convicted of a felony?  No   If \"Yes\" send to -When, What?   Talk Therapy : Send to Alcon Palmer for final determination   Med Management: Send to Dr Brantley for final determination     ACCEPTED as a patient Yes  If \"Yes\" Appointment Date: 2/29/2023 With ally     Referred Elsewhere? No  If “Yes” - (Where? Ex: [facility], [facility], [facility], [facility], etc.)       Name of Insurance Co:Blue Cross   Insurance ID#[id number]  Insurance Phone #  If ins is primary or secondary?Primary   If patient is a minor, parents information such as Name, D.O.B of guarantor.  "

## 2024-02-27 ENCOUNTER — TELEPHONE (OUTPATIENT)
Age: 49
End: 2024-02-27

## 2024-02-27 NOTE — TELEPHONE ENCOUNTER
Has she given him the Ativan?  If she has and that is not working and they cannot wait until he has his psychiatry appointment on the 29th then he would be seen through one of the ERs and they would determine if he would need a psychiatric admission

## 2024-02-27 NOTE — TELEPHONE ENCOUNTER
Pts wife called asking if Dr. Rice can call her directly. Pt is having another episode and she wants to know what the next steps would be.  Please advise    Morena: [phone]

## 2024-02-27 NOTE — TELEPHONE ENCOUNTER
I spoke with patients wife and she advised she is giving him Ativan Q 8 hours. Is going to most likely going to take Ibrahima to [facility] as they are from [facility] and would like him inpatient there. Just an FYI for you

## 2024-03-11 DIAGNOSIS — R46.89 SPELL OF ABNORMAL BEHAVIOR: ICD-10-CM

## 2024-03-11 RX ORDER — SERTRALINE HYDROCHLORIDE 25 MG/1
25 TABLET, FILM COATED ORAL DAILY
Qty: 30 TABLET | Refills: 0 | Status: SHIPPED | OUTPATIENT
Start: 2024-03-11

## 2024-03-12 DIAGNOSIS — F41.9 ANXIETY DISORDER, UNSPECIFIED TYPE: ICD-10-CM

## 2024-03-12 NOTE — TELEPHONE ENCOUNTER
Reason for call:   [x] Refill   [] Prior Auth  [] Other:     Office:   [x] PCP/Provider - Dr. Rice   [] Specialty/Provider -     Medication:     Quantity: 30    Pharmacy: [facility]    Does the patient have enough for 3 days?   [] Yes   [x] No - Send as HP to POD

## 2024-03-13 RX ORDER — LORAZEPAM 0.5 MG/1
0.5 TABLET ORAL EVERY 8 HOURS PRN
Qty: 30 TABLET | Refills: 0 | Status: SHIPPED | OUTPATIENT
Start: 2024-03-13

## 2024-03-20 ENCOUNTER — TELEPHONE (OUTPATIENT)
Dept: NEUROLOGY | Facility: CLINIC | Age: 49
End: 2024-03-20

## 2024-03-20 NOTE — TELEPHONE ENCOUNTER
Hello,     Can you please advise which Speciality/Team and if patient can be seen by an Resident, AP and or Attending  only?    Thank you for your time,     Susu      Recommendations for outpatient neurological follow up have yet to be determined.  Pending for discharge: Routine EEG    HFU/ SL ISABEL/ Spells of abnormal behavior     DC- 2/12/2024- HOME

## 2024-03-22 ENCOUNTER — TELEPHONE (OUTPATIENT)
Dept: PSYCHIATRY | Facility: CLINIC | Age: 49
End: 2024-03-22

## 2024-03-22 NOTE — TELEPHONE ENCOUNTER
Attempted to contact patient to inform 5/22 appt was cancelled due to provider being out of office. Patient has NP appt scheduled 4/24. Patient should reschedule follow up on above date.

## 2024-04-04 ENCOUNTER — TELEPHONE (OUTPATIENT)
Dept: PSYCHIATRY | Facility: CLINIC | Age: 49
End: 2024-04-04

## 2024-04-04 NOTE — TELEPHONE ENCOUNTER
The patient contacted the office, requesting to schedule an in office visit with his therapist. The writer informed the patient that his therapist only does virtual visit, if he would like to see someone in the office he would need to be placed on the wait list. The patient agreed to be placed on the wait list.     Patient has been added to the Talk Therapy wait list with a referral.    Insurance: [place] Personal [employer]   Insurance Type:    Commercial [x]   Medicaid []   County (if applicable)   Medicare []  Location Preference: [place]  Provider Preference: none  Virtual: Yes [] No [x]  Were outside resources sent: Yes [] No [x]

## 2024-04-08 DIAGNOSIS — F41.9 ANXIETY DISORDER, UNSPECIFIED TYPE: ICD-10-CM

## 2024-04-08 DIAGNOSIS — R46.89 SPELL OF ABNORMAL BEHAVIOR: ICD-10-CM

## 2024-04-09 RX ORDER — SERTRALINE HYDROCHLORIDE 25 MG/1
25 TABLET, FILM COATED ORAL DAILY
Qty: 30 TABLET | Refills: 5 | Status: SHIPPED | OUTPATIENT
Start: 2024-04-09

## 2024-04-11 RX ORDER — LORAZEPAM 0.5 MG/1
0.5 TABLET ORAL EVERY 8 HOURS PRN
Qty: 30 TABLET | Refills: 0 | Status: SHIPPED | OUTPATIENT
Start: 2024-04-11

## 2024-04-18 ENCOUNTER — APPOINTMENT (EMERGENCY)
Dept: RADIOLOGY | Facility: HOSPITAL | Age: 49
DRG: 101 | End: 2024-04-18
Payer: COMMERCIAL

## 2024-04-18 ENCOUNTER — HOSPITAL ENCOUNTER (INPATIENT)
Facility: HOSPITAL | Age: 49
LOS: 1 days | Discharge: HOME | DRG: 101 | End: 2024-04-19
Attending: EMERGENCY MEDICINE | Admitting: INTERNAL MEDICINE
Payer: COMMERCIAL

## 2024-04-18 DIAGNOSIS — R56.9 SEIZURE-LIKE ACTIVITY (HCC): ICD-10-CM

## 2024-04-18 DIAGNOSIS — F19.90 SUBSTANCE USE: ICD-10-CM

## 2024-04-18 DIAGNOSIS — N17.9 AKI (ACUTE KIDNEY INJURY) (HCC): Primary | ICD-10-CM

## 2024-04-18 DIAGNOSIS — F41.9 ANXIETY DISORDER, UNSPECIFIED TYPE: ICD-10-CM

## 2024-04-18 DIAGNOSIS — R46.89 SPELL OF ABNORMAL BEHAVIOR: ICD-10-CM

## 2024-04-18 DIAGNOSIS — R25.2 JERKING MOVEMENTS OF EXTREMITIES: ICD-10-CM

## 2024-04-18 LAB
ALBUMIN SERPL BCP-MCNC: 5 G/DL (ref 3.5–5)
ALP SERPL-CCNC: 100 U/L (ref 34–104)
ALT SERPL W P-5'-P-CCNC: 22 U/L (ref 7–52)
ANION GAP SERPL CALCULATED.3IONS-SCNC: 15 MMOL/L (ref 4–13)
APAP SERPL-MCNC: <2 UG/ML (ref 10–20)
APTT PPP: 24 SECONDS (ref 23–37)
AST SERPL W P-5'-P-CCNC: 18 U/L (ref 13–39)
B-OH-BUTYR SERPL-MCNC: 0.12 MMOL/L (ref 0.02–0.27)
BASE EX.OXY STD BLDV CALC-SCNC: 97.1 % (ref 60–80)
BASE EXCESS BLDV CALC-SCNC: -0.2 MMOL/L
BASOPHILS # BLD AUTO: 0.08 THOUSANDS/ÂΜL (ref 0–0.1)
BASOPHILS NFR BLD AUTO: 1 % (ref 0–1)
BILIRUB SERPL-MCNC: 0.55 MG/DL (ref 0.2–1)
BUN SERPL-MCNC: 22 MG/DL (ref 5–25)
CALCIUM SERPL-MCNC: 10.2 MG/DL (ref 8.4–10.2)
CARDIAC TROPONIN I PNL SERPL HS: 6 NG/L
CHLORIDE SERPL-SCNC: 101 MMOL/L (ref 96–108)
CK SERPL-CCNC: 112 U/L (ref 39–308)
CO2 SERPL-SCNC: 26 MMOL/L (ref 21–32)
CREAT SERPL-MCNC: 1.85 MG/DL (ref 0.6–1.3)
EOSINOPHIL # BLD AUTO: 0.09 THOUSAND/ÂΜL (ref 0–0.61)
EOSINOPHIL NFR BLD AUTO: 1 % (ref 0–6)
ERYTHROCYTE [DISTWIDTH] IN BLOOD BY AUTOMATED COUNT: 13 % (ref 11.6–15.1)
ETHANOL SERPL-MCNC: <10 MG/DL
GFR SERPL CREATININE-BSD FRML MDRD: 42 ML/MIN/1.73SQ M
GLUCOSE SERPL-MCNC: 115 MG/DL (ref 65–140)
HCO3 BLDV-SCNC: 24.3 MMOL/L (ref 24–30)
HCT VFR BLD AUTO: 47.1 % (ref 36.5–49.3)
HGB BLD-MCNC: 15.8 G/DL (ref 12–17)
IMM GRANULOCYTES # BLD AUTO: 0.08 THOUSAND/UL (ref 0–0.2)
IMM GRANULOCYTES NFR BLD AUTO: 1 % (ref 0–2)
INR PPP: 0.93 (ref 0.84–1.19)
LIPASE SERPL-CCNC: 12 U/L (ref 11–82)
LYMPHOCYTES # BLD AUTO: 1.22 THOUSANDS/ÂΜL (ref 0.6–4.47)
LYMPHOCYTES NFR BLD AUTO: 7 % (ref 14–44)
MAGNESIUM SERPL-MCNC: 2.3 MG/DL (ref 1.9–2.7)
MCH RBC QN AUTO: 30.4 PG (ref 26.8–34.3)
MCHC RBC AUTO-ENTMCNC: 33.5 G/DL (ref 31.4–37.4)
MCV RBC AUTO: 91 FL (ref 82–98)
MONOCYTES # BLD AUTO: 0.99 THOUSAND/ÂΜL (ref 0.17–1.22)
MONOCYTES NFR BLD AUTO: 6 % (ref 4–12)
NEUTROPHILS # BLD AUTO: 14.78 THOUSANDS/ÂΜL (ref 1.85–7.62)
NEUTS SEG NFR BLD AUTO: 84 % (ref 43–75)
NRBC BLD AUTO-RTO: 0 /100 WBCS
O2 CT BLDV-SCNC: 21.4 ML/DL
PCO2 BLDV: 39.2 MM HG (ref 42–50)
PH BLDV: 7.41 [PH] (ref 7.3–7.4)
PLATELET # BLD AUTO: 295 THOUSANDS/UL (ref 149–390)
PMV BLD AUTO: 9.7 FL (ref 8.9–12.7)
PO2 BLDV: 99.1 MM HG (ref 35–45)
POTASSIUM SERPL-SCNC: 4.2 MMOL/L (ref 3.5–5.3)
PROT SERPL-MCNC: 8 G/DL (ref 6.4–8.4)
PROTHROMBIN TIME: 13.1 SECONDS (ref 11.6–14.5)
RBC # BLD AUTO: 5.19 MILLION/UL (ref 3.88–5.62)
SALICYLATES SERPL-MCNC: <5 MG/DL (ref 3–20)
SODIUM SERPL-SCNC: 142 MMOL/L (ref 135–147)
TSH SERPL DL<=0.05 MIU/L-ACNC: 3.46 UIU/ML (ref 0.45–4.5)
WBC # BLD AUTO: 17.24 THOUSAND/UL (ref 4.31–10.16)

## 2024-04-18 PROCEDURE — 80143 DRUG ASSAY ACETAMINOPHEN: CPT

## 2024-04-18 PROCEDURE — 96366 THER/PROPH/DIAG IV INF ADDON: CPT

## 2024-04-18 PROCEDURE — 99285 EMERGENCY DEPT VISIT HI MDM: CPT | Performed by: EMERGENCY MEDICINE

## 2024-04-18 PROCEDURE — 82077 ASSAY SPEC XCP UR&BREATH IA: CPT

## 2024-04-18 PROCEDURE — 82550 ASSAY OF CK (CPK): CPT

## 2024-04-18 PROCEDURE — 83735 ASSAY OF MAGNESIUM: CPT

## 2024-04-18 PROCEDURE — 84484 ASSAY OF TROPONIN QUANT: CPT

## 2024-04-18 PROCEDURE — 85025 COMPLETE CBC W/AUTO DIFF WBC: CPT

## 2024-04-18 PROCEDURE — 83605 ASSAY OF LACTIC ACID: CPT

## 2024-04-18 PROCEDURE — 83690 ASSAY OF LIPASE: CPT

## 2024-04-18 PROCEDURE — 93005 ELECTROCARDIOGRAM TRACING: CPT

## 2024-04-18 PROCEDURE — 96365 THER/PROPH/DIAG IV INF INIT: CPT

## 2024-04-18 PROCEDURE — 82010 KETONE BODYS QUAN: CPT

## 2024-04-18 PROCEDURE — 80179 DRUG ASSAY SALICYLATE: CPT

## 2024-04-18 PROCEDURE — 36415 COLL VENOUS BLD VENIPUNCTURE: CPT

## 2024-04-18 PROCEDURE — 82805 BLOOD GASES W/O2 SATURATION: CPT

## 2024-04-18 PROCEDURE — 71045 X-RAY EXAM CHEST 1 VIEW: CPT

## 2024-04-18 PROCEDURE — 85730 THROMBOPLASTIN TIME PARTIAL: CPT

## 2024-04-18 PROCEDURE — 85610 PROTHROMBIN TIME: CPT

## 2024-04-18 PROCEDURE — 84443 ASSAY THYROID STIM HORMONE: CPT

## 2024-04-18 PROCEDURE — 99284 EMERGENCY DEPT VISIT MOD MDM: CPT

## 2024-04-18 PROCEDURE — 80053 COMPREHEN METABOLIC PANEL: CPT

## 2024-04-18 RX ADMIN — SODIUM CHLORIDE, SODIUM LACTATE, POTASSIUM CHLORIDE, AND CALCIUM CHLORIDE 1000 ML: .6; .31; .03; .02 INJECTION, SOLUTION INTRAVENOUS at 20:23

## 2024-04-18 RX ADMIN — SODIUM CHLORIDE, SODIUM LACTATE, POTASSIUM CHLORIDE, AND CALCIUM CHLORIDE 1000 ML: .6; .31; .03; .02 INJECTION, SOLUTION INTRAVENOUS at 22:06

## 2024-04-19 ENCOUNTER — HOSPITAL ENCOUNTER (EMERGENCY)
Facility: HOSPITAL | Age: 49
Discharge: HOME | End: 2024-04-20
Attending: EMERGENCY MEDICINE
Payer: COMMERCIAL

## 2024-04-19 VITALS
TEMPERATURE: 96 F | DIASTOLIC BLOOD PRESSURE: 68 MMHG | SYSTOLIC BLOOD PRESSURE: 128 MMHG | OXYGEN SATURATION: 95 % | RESPIRATION RATE: 20 BRPM | HEART RATE: 98 BPM

## 2024-04-19 VITALS
TEMPERATURE: 98.5 F | RESPIRATION RATE: 17 BRPM | HEART RATE: 78 BPM | OXYGEN SATURATION: 96 % | DIASTOLIC BLOOD PRESSURE: 90 MMHG | WEIGHT: 169.97 LBS | BODY MASS INDEX: 23.05 KG/M2 | SYSTOLIC BLOOD PRESSURE: 138 MMHG

## 2024-04-19 DIAGNOSIS — F95.0 TRANSIENT TICS: Primary | ICD-10-CM

## 2024-04-19 DIAGNOSIS — R46.89 SPELL OF ABNORMAL BEHAVIOR: ICD-10-CM

## 2024-04-19 PROBLEM — R79.89 ELEVATED SERUM CREATININE: Status: ACTIVE | Noted: 2024-04-19

## 2024-04-19 PROBLEM — N17.9 AKI (ACUTE KIDNEY INJURY) (HCC): Status: ACTIVE | Noted: 2024-04-19

## 2024-04-19 LAB
2HR DELTA HS TROPONIN: 6 NG/L
AMPHETAMINES SERPL QL SCN: NEGATIVE
AMPHETAMINES SERPL QL SCN: POSITIVE
ANION GAP SERPL CALCULATED.3IONS-SCNC: 4 MMOL/L (ref 4–13)
BARBITURATES UR QL: NEGATIVE
BARBITURATES UR QL: NEGATIVE
BENZODIAZ UR QL: NEGATIVE
BENZODIAZ UR QL: NEGATIVE
BILIRUB UR QL STRIP: NEGATIVE
BUN SERPL-MCNC: 20 MG/DL (ref 5–25)
CALCIUM SERPL-MCNC: 9.1 MG/DL (ref 8.4–10.2)
CARDIAC TROPONIN I PNL SERPL HS: 12 NG/L
CHLORIDE SERPL-SCNC: 103 MMOL/L (ref 96–108)
CLARITY UR: CLEAR
CO2 SERPL-SCNC: 31 MMOL/L (ref 21–32)
COCAINE UR QL: NEGATIVE
COCAINE UR QL: NEGATIVE
COLOR UR: NORMAL
CREAT SERPL-MCNC: 1.07 MG/DL (ref 0.6–1.3)
ERYTHROCYTE [DISTWIDTH] IN BLOOD BY AUTOMATED COUNT: 13 % (ref 11.6–15.1)
FENTANYL UR QL SCN: POSITIVE
FENTANYL UR QL SCN: POSITIVE
GFR SERPL CREATININE-BSD FRML MDRD: 82 ML/MIN/1.73SQ M
GLUCOSE SERPL-MCNC: 84 MG/DL (ref 65–140)
GLUCOSE UR STRIP-MCNC: NEGATIVE MG/DL
HCT VFR BLD AUTO: 37.7 % (ref 36.5–49.3)
HGB BLD-MCNC: 12.5 G/DL (ref 12–17)
HGB UR QL STRIP.AUTO: NEGATIVE
HYDROCODONE UR QL SCN: NEGATIVE
HYDROCODONE UR QL SCN: NEGATIVE
KETONES UR STRIP-MCNC: NEGATIVE MG/DL
LACTATE SERPL-SCNC: 1.3 MMOL/L (ref 0.5–2)
LEUKOCYTE ESTERASE UR QL STRIP: NEGATIVE
MCH RBC QN AUTO: 30.6 PG (ref 26.8–34.3)
MCHC RBC AUTO-ENTMCNC: 33.2 G/DL (ref 31.4–37.4)
MCV RBC AUTO: 92 FL (ref 82–98)
METHADONE UR QL: NEGATIVE
METHADONE UR QL: NEGATIVE
NITRITE UR QL STRIP: NEGATIVE
OPIATES UR QL SCN: NEGATIVE
OPIATES UR QL SCN: NEGATIVE
OXYCODONE+OXYMORPHONE UR QL SCN: NEGATIVE
OXYCODONE+OXYMORPHONE UR QL SCN: NEGATIVE
PCP UR QL: NEGATIVE
PCP UR QL: NEGATIVE
PH UR STRIP.AUTO: 5.5 [PH]
PLATELET # BLD AUTO: 204 THOUSANDS/UL (ref 149–390)
PMV BLD AUTO: 9.9 FL (ref 8.9–12.7)
POTASSIUM SERPL-SCNC: 3.7 MMOL/L (ref 3.5–5.3)
PROT UR STRIP-MCNC: NEGATIVE MG/DL
RBC # BLD AUTO: 4.09 MILLION/UL (ref 3.88–5.62)
SODIUM SERPL-SCNC: 138 MMOL/L (ref 135–147)
SP GR UR STRIP.AUTO: 1.01 (ref 1–1.03)
THC UR QL: NEGATIVE
THC UR QL: NEGATIVE
UROBILINOGEN UR STRIP-ACNC: <2 MG/DL
WBC # BLD AUTO: 7.43 THOUSAND/UL (ref 4.31–10.16)

## 2024-04-19 PROCEDURE — 99283 EMERGENCY DEPT VISIT LOW MDM: CPT

## 2024-04-19 PROCEDURE — 85027 COMPLETE CBC AUTOMATED: CPT

## 2024-04-19 PROCEDURE — 81003 URINALYSIS AUTO W/O SCOPE: CPT

## 2024-04-19 PROCEDURE — 80307 DRUG TEST PRSMV CHEM ANLYZR: CPT

## 2024-04-19 PROCEDURE — 99223 1ST HOSP IP/OBS HIGH 75: CPT | Performed by: INTERNAL MEDICINE

## 2024-04-19 PROCEDURE — 99223 1ST HOSP IP/OBS HIGH 75: CPT | Performed by: PSYCHIATRY & NEUROLOGY

## 2024-04-19 PROCEDURE — 80048 BASIC METABOLIC PNL TOTAL CA: CPT

## 2024-04-19 PROCEDURE — 99223 1ST HOSP IP/OBS HIGH 75: CPT | Performed by: STUDENT IN AN ORGANIZED HEALTH CARE EDUCATION/TRAINING PROGRAM

## 2024-04-19 PROCEDURE — 99285 EMERGENCY DEPT VISIT HI MDM: CPT | Performed by: EMERGENCY MEDICINE

## 2024-04-19 PROCEDURE — 99245 OFF/OP CONSLTJ NEW/EST HI 55: CPT | Performed by: PSYCHIATRY & NEUROLOGY

## 2024-04-19 RX ORDER — LORAZEPAM 0.5 MG/1
0.5 TABLET ORAL EVERY 8 HOURS PRN
Status: DISCONTINUED | OUTPATIENT
Start: 2024-04-19 | End: 2024-04-19

## 2024-04-19 RX ORDER — NALOXONE HYDROCHLORIDE 4 MG/.1ML
SPRAY NASAL
Qty: 1 EACH | Refills: 0 | Status: SHIPPED | OUTPATIENT
Start: 2024-04-19 | End: 2025-04-19

## 2024-04-19 RX ORDER — SODIUM CHLORIDE, SODIUM GLUCONATE, SODIUM ACETATE, POTASSIUM CHLORIDE, MAGNESIUM CHLORIDE, SODIUM PHOSPHATE, DIBASIC, AND POTASSIUM PHOSPHATE .53; .5; .37; .037; .03; .012; .00082 G/100ML; G/100ML; G/100ML; G/100ML; G/100ML; G/100ML; G/100ML
100 INJECTION, SOLUTION INTRAVENOUS CONTINUOUS
Status: DISCONTINUED | OUTPATIENT
Start: 2024-04-19 | End: 2024-04-19

## 2024-04-19 RX ORDER — SERTRALINE HYDROCHLORIDE 25 MG/1
25 TABLET, FILM COATED ORAL DAILY
Status: DISCONTINUED | OUTPATIENT
Start: 2024-04-19 | End: 2024-04-19 | Stop reason: HOSPADM

## 2024-04-19 RX ORDER — ACETAMINOPHEN 325 MG/1
650 TABLET ORAL EVERY 6 HOURS PRN
Status: DISCONTINUED | OUTPATIENT
Start: 2024-04-19 | End: 2024-04-19 | Stop reason: HOSPADM

## 2024-04-19 RX ORDER — ONDANSETRON 2 MG/ML
4 INJECTION INTRAMUSCULAR; INTRAVENOUS EVERY 6 HOURS PRN
Status: DISCONTINUED | OUTPATIENT
Start: 2024-04-19 | End: 2024-04-19 | Stop reason: HOSPADM

## 2024-04-19 RX ORDER — BACITRACIN, NEOMYCIN, POLYMYXIN B 400; 3.5; 5 [USP'U]/G; MG/G; [USP'U]/G
1 OINTMENT TOPICAL 2 TIMES DAILY
Status: DISCONTINUED | OUTPATIENT
Start: 2024-04-19 | End: 2024-04-19 | Stop reason: HOSPADM

## 2024-04-19 RX ORDER — SODIUM CHLORIDE, SODIUM GLUCONATE, SODIUM ACETATE, POTASSIUM CHLORIDE, MAGNESIUM CHLORIDE, SODIUM PHOSPHATE, DIBASIC, AND POTASSIUM PHOSPHATE .53; .5; .37; .037; .03; .012; .00082 G/100ML; G/100ML; G/100ML; G/100ML; G/100ML; G/100ML; G/100ML
100 INJECTION, SOLUTION INTRAVENOUS CONTINUOUS
Status: DISPENSED | OUTPATIENT
Start: 2024-04-19 | End: 2024-04-19

## 2024-04-19 RX ADMIN — SERTRALINE HYDROCHLORIDE 25 MG: 25 TABLET ORAL at 01:35

## 2024-04-19 RX ADMIN — SODIUM CHLORIDE, SODIUM GLUCONATE, SODIUM ACETATE, POTASSIUM CHLORIDE, MAGNESIUM CHLORIDE, SODIUM PHOSPHATE, DIBASIC, AND POTASSIUM PHOSPHATE 100 ML/HR: .53; .5; .37; .037; .03; .012; .00082 INJECTION, SOLUTION INTRAVENOUS at 01:11

## 2024-04-19 RX ADMIN — SODIUM CHLORIDE, SODIUM GLUCONATE, SODIUM ACETATE, POTASSIUM CHLORIDE, MAGNESIUM CHLORIDE, SODIUM PHOSPHATE, DIBASIC, AND POTASSIUM PHOSPHATE 100 ML/HR: .53; .5; .37; .037; .03; .012; .00082 INJECTION, SOLUTION INTRAVENOUS at 11:21

## 2024-04-19 RX ADMIN — BACITRACIN ZINC, NEOMYCIN SULFATE , POLYMYXIN B SULFATE. 1 SMALL APPLICATION: 400; 3.5; 5 OINTMENT TOPICAL at 08:57

## 2024-04-19 RX ADMIN — LORAZEPAM 0.5 MG: 0.5 TABLET ORAL at 01:35

## 2024-04-19 NOTE — ASSESSMENT & PLAN NOTE
Based the chronicity, prior workups- more likely psychiatric features.  Routine EEG if patient has another episode.  Rest of plan seen in abnormal behavior.

## 2024-04-19 NOTE — ED ATTENDING ATTESTATION
"4/18/2024  I, Delores March DO, saw and evaluated the patient. I have discussed the patient with the resident/non-physician practitioner and agree with the resident's/non-physician practitioner's findings, Plan of Care, and MDM as documented in the resident's/non-physician practitioner's note, except where noted. All available labs and Radiology studies were reviewed.  I was present for key portions of any procedure(s) performed by the resident/non-physician practitioner and I was immediately available to provide assistance.       At this point I agree with the current assessment done in the Emergency Department.  I have conducted an independent evaluation of this patient a history and physical is as follows:    ED Course   48-year-old male presents by ambulance from home for evaluation of intermittent altered mental status.  Patient's wife is at the bedside to help provide history.  She reports the patient has \"episodes\" thought to be secondary to severe anxiety and psychosis.  Patient is intermittently awake but dozes off frequently during history.  His wife reports that this evening he began to make moaning noises in the bathroom.  When she checked on him he was repetitively wiping his face which she reports she does as a way to comfort himself.  He has been having severe anxiety and difficulty sleeping she does not believe that he has slept in over 48 hours.  He has intermittent tics and body twitching that prevents him from sleep.  This occurs when his mental illness is severe.  Patient was noted to start making noise and his wife checked on him again where he seemed to be poorly responsive and drooling with his face flat on the bathroom countertop.  She does not believe that he injured himself.  No seizure activity noted.  Patient does have a history of chronic recurrent vomiting.  He was able to tolerate a normal diet today.  No reported fevers.  Patient was admitted in February of this year for similar " symptoms and had an extensive workup.  He is currently taking Zoloft and lorazepam 0.5 mg in the evening.  He does have a history of substance use disorder but denies current drug use.  Etiology of patient's symptoms at the time of his last admission were unclear and differential included seizure, encephalitis, stroke, malignancy, paraneoplastic syndrome, primary psychiatric disorder.  Wife does report that the patient's behavior does tend to worsen when the patient is experiencing increased stress.    Wt Readings from Last 3 Encounters:   04/18/24 78.5 kg (173 lb 1 oz)   02/09/24 81.6 kg (180 lb)   01/29/24 84.8 kg (187 lb)        Past medical history: Substance abuse, anxiety disorder    Physical exam: Patient is drowsy but easily arousable.  He has behavioral tics with flexion of the lower extremities.  He appears to drift off to sleep during conversations and rapidly awakens to verbal stimuli and appears panicked upon waking.  Mucous membranes are dry.  Heart is tachycardic, regular.  Lungs are clear to auscultation.  Abdomen is soft, flat, nontender nondistended with normoactive bowel sounds.  He has 5-5 strength in all 4 extremities.  No focal motor or sensory deficits.    Assessment/plan: 48-year-old male presents with recurrent symptoms of intermittent confusion, repetitive behaviors and tics.  Patient also has had progressive weight loss.  Differential diagnosis is broad and includes delirium secondary to insomnia, dehydration, electrolyte abnormality, starvation, substance abuse, psychosis, encephalopathy    Patient did have 2 episodes of vomiting, large volumes.  He has not produced a urine sample.  Available blood work is concerning for acute kidney injury.  Will admit for IV hydration, monitoring of behavior and neurology/psychiatry consultation.    Critical Care Time  Procedures

## 2024-04-19 NOTE — ASSESSMENT & PLAN NOTE
Patient with ongoing chronic symptoms of worsening intermittent confusion, repetitive behavior and attacks.  Recurrent episode happened in bathroom PM of the admission day - first episode since Easter, catatonic behavior?  Confusion during episode, minimal confusion state after episode. Reported drooling, perfusing diaphoresis.  Denied tongue biting, urinary/bowel incontinence.  Patient was recently hospitalized in February 2022 at [facility] for similar presentation with extensive workup performed.   CT head, MRI brain, CT CAP negative for acute findings.  Spot EEG routine normal.  Negative UDS  HIV and Lyme titer negative  Normal homocystine, TSH, MMA,  ceruloplasmin, heavy metal and copper level  Neurology and the psychiatric was consulted during that hospitalization- felt more likely psychosis feature in the setting of normal workup.  Patient was discharged on Zoloft 25 mg daily. Patient was prescribed Ativan 0.5 mg every 8 hours as needed per PCP.  Patient reported ever since he got Ativan which was prescribed by his PCP since Easter, he did not have any episode of abnormal behavior until today.  He has slowly tapered his Ativan from 3 times daily to 3 times daily to 1 time daily in a weekly period. He stopped taking Ativan for 1 week with no recurrence of episode.  He slowly started retaking Ativan with half tablet since 2 weeks ago increased to 1 tablet/day last week due to increased distress secondary to unemployment and financial stress.  In the ED, on my evaluation, patient was OOA x 4, moving all extremities freely.    Plan:  Neurology/psychiatry consult.  Recommendation appreciated.  EEG if abnormal behavior episode.  Patient and wife are interested in inpatient psych unit. CM and  on board.  Continue Zoloft 25 mg daily.  Redosing per psychiatry.  Continue Ativan 0.5 mg as home dosage.  Delirium precaution.

## 2024-04-19 NOTE — H&P
[facility]  H&P  Name: Ibrahima Adams 48 y.o. male I MRN: [id number]  Unit/Bed#: S -01 I Date of Admission: 4/18/2024   Date of Service: 4/19/2024 I Hospital Day: 1      Assessment/Plan   * Spells of abnormal behavior  Assessment & Plan  Patient with ongoing chronic symptoms of worsening intermittent confusion, repetitive behavior and attacks.  Recurrent episode happened in bathroom PM of the admission day - first episode since Easter, catatonic behavior?  Confusion during episode, minimal confusion state after episode. Reported drooling, perfusing diaphoresis.  Denied tongue biting, urinary/bowel incontinence.  Patient was recently hospitalized in February 2022 at [facility] for similar presentation with extensive workup performed.   CT head, MRI brain, CT CAP negative for acute findings.  Spot EEG routine normal.  Negative UDS  HIV and Lyme titer negative  Normal homocystine, TSH, MMA,  ceruloplasmin, heavy metal and copper level  Neurology and the psychiatric was consulted during that hospitalization- felt more likely psychosis feature in the setting of normal workup.  Patient was discharged on Zoloft 25 mg daily. Patient was prescribed Ativan 0.5 mg every 8 hours as needed per PCP.  Patient reported ever since he got Ativan which was prescribed by his PCP since Easter, he did not have any episode of abnormal behavior until today.  He has slowly tapered his Ativan from 3 times daily to 3 times daily to 1 time daily in a weekly period. He stopped taking Ativan for 1 week with no recurrence of episode.  He slowly started retaking Ativan with half tablet since 2 weeks ago increased to 1 tablet/day last week due to increased distress secondary to unemployment and financial stress.  In the ED, on my evaluation, patient was OOA x 4, moving all extremities freely.    Plan:  Neurology/psychiatry consult.  Recommendation appreciated.  EEG if abnormal behavior episode.  Patient and wife are  interested in inpatient psych unit. CM and  on board.  Continue Zoloft 25 mg daily.  Redosing per psychiatry.  Continue Ativan 0.5 mg as home dosage.  Delirium precaution.    Elevated serum creatinine  Assessment & Plan  Lab Results   Component Value Date    CREATININE 1.85 (H) 04/18/2024    CREATININE 0.94 02/12/2024    CREATININE 1.07 02/09/2024     Baseline Cr around 1.  Wife reported patient was drinking enough water and was vomiting and drooling during episode.  Reported good UOP at home but no urination while in ED after PO water intake and 2L IVF.    Plan:  UA w/ microscopic, Urinary retention protocol, Bladder scan, Daily weights, I/O  IV Fluids 100cc/hr x12 hrs.  Monitor BMP daily and observe for downward trend of creatinine  Avoid hypoperfusion of the kidneys, minimize nephrotoxins  Consider Kidney US if no improvement on next Cr check.    Seizure-like activity (HCC)  Assessment & Plan  Based the chronicity, prior workups- more likely psychiatric features.  Routine EEG if patient has another episode.  Rest of plan seen in abnormal behavior.    Anxiety disorder  Assessment & Plan  Continue home Zoloft 25 mg daily, as needed Ativan.         VTE Pharmacologic Prophylaxis: VTE Score: 1 Low Risk (Score 0-2) - Encourage Ambulation.  Code Status: Level 1 - Full Code   Discussion with family: Updated  (wife) at bedside.    Anticipated Length of Stay: Patient will be admitted on an inpatient basis with an anticipated length of stay of greater than 2 midnights secondary to abnormal behavior.    Chief Complaint:     History of Present Illness:  Ibrahima Adams is a 48 y.o. male who has a past medical history of depression, anxiety, abnormal behavior, H/o opioid OD presents with seizure-like activity/abnormal behavior. Of note, Patient with ongoing chronic symptoms of worsening intermittent confusion, repetitive behavior and attacks.     Episode details discussed with wife and per ED  note:  Patient has been doing well since Easter until today. Patient wife states that she heard him grunting when she went to check on him he was sitting on the toilet making the jerking/motor tic movements with his legs and his arms, rubbing his face with his hand.  His wife states that his eyes also rolled back of his head and was unresponsive. Wife also noticed patient was drooling and had perfusing diaphoresis. Patient states last thing he remembers that he went to the bathroom and the next thing he knew was that EMS and police right at home and he is being taken to the emergency department.  Patient's wife states that towards the end of his episode in the bathroom he  developed stiffness in arms and legs but did not have any shaking. Wife states patient does not drink water frequently.  Patient wife also states during the episodes of the bathroom he had 2 episodes of vomiting.  No fevers, chills, chest pain, shortness of breath, blood in vomit, bowel changes, bladder changes, headache, focal weakness, paresthesias.    Patient was recently hospitalized in February 2022 at [facility] for similar presentation with extensive workup performed.  Neurology and the psychiatric was consulted during that hospitalization which were all unremarkable. Patient was discharged on Zoloft 25 mg daily. Patient was prescribed Ativan 0.5 mg every 8 hours as needed per PCP. Patient reported ever since he got Ativan which was prescribed by his PCP since Easter, he did not have any episode of abnormal behavior until today.  He has slowly tapered his Ativan from 3 times daily to 3 times daily to 1 time daily in a weekly period.  He stopped taking Ativan for 1 week with no recurrence of episode. He slowly started retaking Ativan with half tablet since 2 weeks ago increased to 1 tablet/day last week due to increased distress secondary to unemployment and financial stress.     In the ED, afebrile, tachycardia and tachypnea, On RA. OAA x4 upon my  eval. WBC 17.24, Cr 1.85, TSH WNL. Coma panel neg. UDS pending. VBG 7.41-39.2-99.1-24.3. Trop 6-?. Received 2L IVF in the ED.    Review of Systems:  Review of Systems   Constitutional:  Positive for diaphoresis. Negative for chills and fever.   HENT:  Negative for ear pain, sore throat and trouble swallowing.    Eyes:  Negative for pain and visual disturbance.   Respiratory:  Negative for cough, shortness of breath and wheezing.    Cardiovascular:  Negative for chest pain, palpitations and leg swelling.   Gastrointestinal:  Positive for constipation. Negative for abdominal pain, diarrhea and vomiting.   Genitourinary:  Negative for dysuria and hematuria.   Skin:  Positive for rash (facial rashes).   Neurological:  Positive for tremors. Negative for dizziness, syncope, facial asymmetry, speech difficulty, weakness, light-headedness and headaches.   Psychiatric/Behavioral:  Positive for confusion. Negative for agitation, behavioral problems, decreased concentration and dysphoric mood. The patient is nervous/anxious.    All other systems reviewed and are negative.      Past Medical and Surgical History:   Past Medical History:   Diagnosis Date    Chronic back pain        Past Surgical History:   Procedure Laterality Date    WISDOM TOOTH EXTRACTION         Meds/Allergies:  Prior to Admission medications    Medication Sig Start Date End Date Taking? Authorizing Provider   LORazepam (ATIVAN) 0.5 mg tablet Take 1 tablet (0.5 mg total) by mouth every 8 (eight) hours as needed for anxiety 4/11/24  Yes Charles Rice DO   sertraline (ZOLOFT) 25 mg tablet Take 1 tablet (25 mg total) by mouth daily 4/9/24  Yes Charles Rice DO     I have reviewed home medications with patient personally.    Allergies: No Known Allergies    Social History:  Marital Status:   Occupation:   Patient Pre-hospital Living Situation: Home, With spouse  Patient Pre-hospital Level of Mobility: walks  Patient Pre-hospital Diet Restrictions: N/A  Substance  Use History:   Social History     Substance and Sexual Activity   Alcohol Use Yes    Alcohol/week: 2.0 standard drinks of alcohol    Types: 1 Glasses of wine, 1 Cans of beer per week    Comment: 3 drinks/week     Social History     Tobacco Use   Smoking Status Former    Types: Cigars   Smokeless Tobacco Never   Tobacco Comments    has a cigar about 2/month     Social History     Substance and Sexual Activity   Drug Use Not Currently    Types: Marijuana, Cocaine, Oxycodone    Comment: snorted cocain about 10 years ago for 1 year       Family History:  Family History   Problem Relation Age of Onset    Cirrhosis Mother     Cholelithiasis Mother     Diabetes type II Father     Stroke Maternal Grandfather     Diabetes Paternal Grandfather        Physical Exam:     Vitals:   Blood Pressure: 118/71 (04/19/24 0109)  Pulse: 90 (04/19/24 0109)  Temperature: 98.2 °F (36.8 °C) (04/19/24 0109)  Temp Source: Oral (04/18/24 2050)  Respirations: 16 (04/19/24 0109)  Weight - Scale: 78.5 kg (173 lb 1 oz) (04/18/24 2050)  SpO2: 95 % (04/19/24 0109)    Physical Exam  Vitals and nursing note reviewed.   Constitutional:       General: He is not in acute distress.     Appearance: He is well-developed. He is not ill-appearing.   HENT:      Head: Normocephalic and atraumatic.      Right Ear: External ear normal.      Left Ear: External ear normal.      Nose: Nose normal. No congestion or rhinorrhea.      Mouth/Throat:      Mouth: Mucous membranes are moist.      Pharynx: No posterior oropharyngeal erythema.   Eyes:      General:         Right eye: No discharge.         Left eye: No discharge.      Extraocular Movements: Extraocular movements intact.      Conjunctiva/sclera: Conjunctivae normal.      Pupils: Pupils are equal, round, and reactive to light.   Cardiovascular:      Rate and Rhythm: Normal rate and regular rhythm.      Pulses: Normal pulses.   Pulmonary:      Effort: Pulmonary effort is normal. No respiratory distress.       Breath sounds: Normal breath sounds. No wheezing, rhonchi or rales.   Chest:      Chest wall: No tenderness.   Abdominal:      General: Bowel sounds are normal.      Palpations: Abdomen is soft.      Tenderness: There is no abdominal tenderness. There is no right CVA tenderness, left CVA tenderness, guarding or rebound.   Musculoskeletal:         General: No swelling or tenderness. Normal range of motion.      Cervical back: Normal range of motion and neck supple. No rigidity or tenderness.   Lymphadenopathy:      Cervical: No cervical adenopathy.   Skin:     General: Skin is warm and dry.      Capillary Refill: Capillary refill takes less than 2 seconds.      Findings: Rash (face) present.   Neurological:      Mental Status: He is alert and oriented to person, place, and time.      Comments: Face symmetrical, tongue midline. Normal tongue movement. Normal speech.  No sensory deficits on face.  Normal vision, normal EOM, normal hearing.  Bilateral hand  strength 5/5.  Bilateral upper extremities strength 5/5. No light touch/proprioception sensory deficits.  Bilateral feet/ legs strength 5/5.  No light touch/proprioception sensory deficits.    Random muscle twitching noticed in patient front chest.   Psychiatric:         Behavior: Behavior normal.         Thought Content: Thought content normal.         Judgment: Judgment normal.          Additional Data:     Lab Results:  Results from last 7 days   Lab Units 04/18/24 2023   WBC Thousand/uL 17.24*   HEMOGLOBIN g/dL 15.8   HEMATOCRIT % 47.1   PLATELETS Thousands/uL 295   SEGS PCT % 84*   LYMPHO PCT % 7*   MONO PCT % 6   EOS PCT % 1     Results from last 7 days   Lab Units 04/18/24 2023   SODIUM mmol/L 142   POTASSIUM mmol/L 4.2   CHLORIDE mmol/L 101   CO2 mmol/L 26   BUN mg/dL 22   CREATININE mg/dL 1.85*   ANION GAP mmol/L 15*   CALCIUM mg/dL 10.2   ALBUMIN g/dL 5.0   TOTAL BILIRUBIN mg/dL 0.55   ALK PHOS U/L 100   ALT U/L 22   AST U/L 18   GLUCOSE RANDOM mg/dL  115     Results from last 7 days   Lab Units 04/18/24 2023   INR  0.93             Results from last 7 days   Lab Units 04/18/24  2344   LACTIC ACID mmol/L 1.3       Lines/Drains:  Invasive Devices       Peripheral Intravenous Line  Duration             Peripheral IV 04/18/24 Dorsal (posterior);Right Forearm <1 day                        Imaging: Reviewed radiology reports from this admission including: chest xray and Personally reviewed the following imaging: chest CT scan, abdominal/pelvic CT, CT head, and MRI brain  XR chest 1 view portable   ED Interpretation by Theresa Sanchez DO (04/18 2219)   No acute cardiopulmonary process visualized.          EKG and Other Studies Reviewed on Admission:   EKG: Sinus Tachycardia. .    ** Please Note: This note has been constructed using a voice recognition system. **

## 2024-04-19 NOTE — DISCHARGE SUMMARY
[facility]   DISCHARGE- Ibrahima Adams, 1975, 48 y.o. male MRN: [id number]   Unit/Bed#: S /S -01 Encounter: [id number]   Primary Care Physician: Charles Rice DO   Date and Time Admitted to Hospital: 4/18/2024  7:45 PM     Assessment/Plan    * Spells of abnormal behavior  Assessment & Plan  Patient with ongoing chronic symptoms of worsening intermittent confusion, repetitive behavior and attacks.  Recurrent episode happened in bathroom PM of the admission day - first episode since Easter, catatonic behavior?  Confusion during episode, minimal confusion state after episode. Reported drooling, perfusing diaphoresis.  Denied tongue biting, urinary/bowel incontinence.  Patient was recently hospitalized in February 2022 at [facility] for similar presentation with extensive workup performed.   CT head, MRI brain, CT CAP negative for acute findings.  Spot EEG routine normal.  Negative UDS  HIV and Lyme titer negative  Normal homocystine, TSH, MMA,  ceruloplasmin, heavy metal and copper level  Neurology and the psychiatric was consulted during that hospitalization- felt more likely psychosis feature in the setting of normal workup.  Patient was discharged on Zoloft 25 mg daily. Patient was prescribed Ativan 0.5 mg every 8 hours as needed per PCP.  Patient reported ever since he got Ativan which was prescribed by his PCP since Easter, he did not have any episode of abnormal behavior until today.  He has slowly tapered his Ativan from 3 times daily to 3 times daily to 1 time daily in a weekly period. He stopped taking Ativan for 1 week with no recurrence of episode.  He slowly started retaking Ativan with half tablet since 2 weeks ago increased to 1 tablet/day last week due to increased distress secondary to unemployment and financial stress.  In the ED, on my evaluation, patient was OOA x 4, moving all extremities freely.  Patient had positive UDS of amphetamines and fentanyl, repeat UDS  was positive for only fentanyl.  Patient is adamant he has not taken any fentanyl since 2021.    Plan:  Ambulatory 72-hour EEG per neurology   Increase Zoloft 50 mg daily.    Stop Ativan  Follow-up with epilepsy specialist in 6 to 8 weeks  Follow-up with psychiatry next week, April 24  Establish with psychologist for therapy  Patient given new prescription of Narcan    CHAGO (acute kidney injury) (HCC)  Assessment & Plan  Lab Results   Component Value Date    CREATININE 1.07 04/19/2024    CREATININE 1.85 (H) 04/18/2024    CREATININE 0.94 02/12/2024     Baseline Cr around 1.  Wife reported patient was drinking enough water and was vomiting and drooling during episode.  Reported good UOP at home but no urination while in ED after PO water intake and 2L IVF.  UA was unremarkable  Creatinine normalized      Seizure-like activity (HCC)  Assessment & Plan  Based the chronicity, prior workups- more likely psychiatric features.  Ambulatory 72-hour EEG per neurology  If patient has another spell patient was advised to go to [facility] for continuous video EEG monitoring    Anxiety disorder  Assessment & Plan  Increase Zoloft to 50 mg daily as per psychiatry  Stop Ativan       Medical Problems       Resolved Problems  Date Reviewed: 4/19/2024   None             Discharging Resident: Quique Hernandez DO  Discharging Attending: Haider Merchant MD  PCP: Charles Rice DO  Admission Date:   Admission Orders (From admission, onward)       Ordered        04/18/24 2241  INPATIENT ADMISSION  Once                          Discharge Date: 04/19/24    Consultations During Hospital Stay:  IP CONSULT TO PSYCHIATRY  IP CONSULT TO NEUROLOGY     Procedures Performed:   None    Significant Findings / Test Results:   UDS was positive for amphetamines and fentanyl, repeat UDS was only positive for fentanyl  Patient initially had leukocytosis of 17, this was normalized  Patient had elevated creatinine 1.85, this also  normalized.    Incidental Findings:   As above  I reviewed the above mentioned incidental findings with the patient and/or family and they expressed understanding.    Test Results Pending at Discharge (will require follow up):  None     Outpatient Tests Requested:  72-hour ambulatory EEG    Complications: None    Reason for Admission: Abnormal behavioral spells, CHAGO    Hospital Course:   Ibrahima Adams is a 48 y.o. male who presented to the hospital on 4/18/2024 initially due to abnormal episodes of behavior that are likely stress-induced.  On admission patient had leukocytosis 17 which normalized.  Creatinine 1.85 which normalized after 2 L IV fluids.  Neurology was consulted who recommended patient to have a 72-hour ambulatory EEG and also advised the patient if he were to have another one of his abnormal behavioral spells to return to [facility] for video EEG monitoring to characterize the spells as epileptic or not.  Psychiatry was also consulted and recommended the patient to stop Ativan, increase the Zoloft to 50 mg daily, and recommended patient have new prescription of Narcan.  His UDS was initially positive for amphetamines as well as fentanyl.  Repeat UDS was positive for fentanyl only.  Patient is adamant that he has not taken any fentanyl since 2021.  Psychiatry recommended patient keep his outpatient psychiatry appointment next Wednesday on April 24.  In addition they recommended the patient to continue to work on establishing himself with a psychologist for therapy.  As psychiatry and neurology have no further inpatient workup recommendations and patient is not currently having any of his abnormal behavioral spells he is now stable for discharge.  Patient's abnormal lab work Normalized.  Patient is agreeable to discharge at this time.    The patient, initially admitted to the hospital as inpatient, was discharged earlier than expected given the following: .  Patient currently not having one of  his abnormal behavioral spells unable to monitor and evaluate the spell as a seizure using EEG.  Patient was advised to return to symptoms that for EEG monitoring if this occurs again..    Please see above list of diagnoses and related plan for additional information.     Condition at Discharge: Stable    Discharge Day Visit / Exam:   Subjective: No acute events overnight.  Patient seen lying in bed comfortably.  Patient noted that these spells to occur during moments of stress and do seem to cease whenever patient is confronted by police or brought to the hospital.  Otherwise patient does not have any other acute concerns or symptoms at this time.    Vitals: Blood Pressure: 138/90 (04/19/24 1457)  Pulse: 78 (04/19/24 1457)  Temperature: 98.5 °F (36.9 °C) (04/19/24 1457)  Temp Source: Oral (04/18/24 2050)  Respirations: 17 (04/19/24 0755)  Weight - Scale: 77.1 kg (169 lb 15.6 oz) (04/19/24 0627)  SpO2: 96 % (04/19/24 1457)    Physical Exam  Vitals and nursing note reviewed.   Constitutional:       General: He is not in acute distress.     Appearance: He is well-developed.   HENT:      Head: Normocephalic and atraumatic.   Eyes:      Conjunctiva/sclera: Conjunctivae normal.   Cardiovascular:      Rate and Rhythm: Normal rate and regular rhythm.      Pulses: Normal pulses.      Heart sounds: Normal heart sounds. No murmur heard.  Pulmonary:      Effort: Pulmonary effort is normal. No respiratory distress.      Breath sounds: Normal breath sounds.   Abdominal:      General: Bowel sounds are normal. There is no distension.      Palpations: Abdomen is soft.      Tenderness: There is no abdominal tenderness. There is no guarding or rebound.   Musculoskeletal:         General: No swelling.   Skin:     General: Skin is warm and dry.      Capillary Refill: Capillary refill takes less than 2 seconds.   Neurological:      Mental Status: He is alert.      Sensory: No sensory deficit.      Motor: No weakness.      Deep Tendon  Reflexes: Reflexes normal.   Psychiatric:         Mood and Affect: Mood normal.          Discussion with Family: Updated  (wife) at bedside.    Discharge instructions/Information to patient and family:   See after visit summary for information provided to patient and family.      Provisions for Follow-Up Care:  See after visit summary for information related to follow-up care and any pertinent home health orders.       Disposition:   Home    Planned Readmission: No     Discharge Medications:  See after visit summary for reconciled discharge medications provided to patient and/or family.      **Please Note: This note may have been constructed using a voice recognition system**

## 2024-04-19 NOTE — ASSESSMENT & PLAN NOTE
Lab Results   Component Value Date    CREATININE 1.85 (H) 04/18/2024    CREATININE 0.94 02/12/2024    CREATININE 1.07 02/09/2024     Baseline Cr around 1.  Wife reported patient was drinking enough water and was vomiting and drooling during episode.  Reported good UOP at home but no urination while in ED after PO water intake and 2L IVF.    Plan:  UA w/ microscopic, Urinary retention protocol, Bladder scan, Daily weights, I/O  IV Fluids 100cc/hr x12 hrs.  Monitor BMP daily and observe for downward trend of creatinine  Avoid hypoperfusion of the kidneys, minimize nephrotoxins  Consider Kidney US if no improvement on next Cr check.
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
## 2024-04-19 NOTE — ASSESSMENT & PLAN NOTE
Patient with ongoing chronic symptoms of worsening intermittent confusion, repetitive behavior and attacks.  Recurrent episode happened in bathroom PM of the admission day - first episode since Easter, catatonic behavior?  Confusion during episode, minimal confusion state after episode. Reported drooling, perfusing diaphoresis.  Denied tongue biting, urinary/bowel incontinence.  Patient was recently hospitalized in February 2022 at [facility] for similar presentation with extensive workup performed.   CT head, MRI brain, CT CAP negative for acute findings.  Spot EEG routine normal.  Negative UDS  HIV and Lyme titer negative  Normal homocystine, TSH, MMA,  ceruloplasmin, heavy metal and copper level  Neurology and the psychiatric was consulted during that hospitalization- felt more likely psychosis feature in the setting of normal workup.  Patient was discharged on Zoloft 25 mg daily. Patient was prescribed Ativan 0.5 mg every 8 hours as needed per PCP.  Patient reported ever since he got Ativan which was prescribed by his PCP since Easter, he did not have any episode of abnormal behavior until today.  He has slowly tapered his Ativan from 3 times daily to 3 times daily to 1 time daily in a weekly period. He stopped taking Ativan for 1 week with no recurrence of episode.  He slowly started retaking Ativan with half tablet since 2 weeks ago increased to 1 tablet/day last week due to increased distress secondary to unemployment and financial stress.  In the ED, on my evaluation, patient was OOA x 4, moving all extremities freely.  Patient had positive UDS of amphetamines and fentanyl, repeat UDS was positive for only fentanyl.  Patient is adamant he has not taken any fentanyl since 2021.    Plan:  Ambulatory 72-hour EEG per neurology   Increase Zoloft 50 mg daily.    Stop Ativan  Follow-up with epilepsy specialist in 6 to 8 weeks  Follow-up with psychiatry next week, April 24  Establish with psychologist for therapy  Patient  given new prescription of Narcan

## 2024-04-19 NOTE — ASSESSMENT & PLAN NOTE
Lab Results   Component Value Date    CREATININE 1.07 04/19/2024    CREATININE 1.85 (H) 04/18/2024    CREATININE 0.94 02/12/2024     Baseline Cr around 1.  Wife reported patient was drinking enough water and was vomiting and drooling during episode.  Reported good UOP at home but no urination while in ED after PO water intake and 2L IVF.  UA was unremarkable  Creatinine normalized

## 2024-04-19 NOTE — CONSULTS
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 Thousands/uL    MPV 9.9 8.9 - 12.7 fL        Code Status: Level 1 - Full Code  Advance Directive and Living Will:      Power of :    POLST:        This note was not shared with the patient due to reasonable likelihood of causing patient harm      This note has been constructed in part using a voice recognition system.   There may be translation, syntax,  or grammatical errors. If you have any questions, please contact the dictating provider.    Sneha Cruz MD  Department of Psychiatry and Behavioral Health  [facility]

## 2024-04-19 NOTE — DISCHARGE INSTR - AVS FIRST PAGE
Dear Ibrahima Adams,     It was our pleasure to care for you here at [facility].  It is our hope that we were always able to exceed the expected standards for your care during your stay.  You were hospitalized due to Abnormal behavioral spells.  You were cared for on the [place] fourth floor by Quique Hernandez DO under the service of Haider Merchant MD with the [facility] who covers for your primary care physician (PCP), Charles Rice DO, while you were hospitalized.  If you have any questions or concerns related to this hospitalization, you may contact us at .  For follow up as well as any medication refills, we recommend that you follow up with your primary care physician.  A registered nurse will reach out to you by phone within a few days after your discharge to answer any additional questions that you may have after going home.  However, at this time we provide for you here, the most important instructions / recommendations at discharge:     Notable Medication Adjustments -   Increase Zoloft to 50 mg daily  Please use Narcan nasal spray every 2 to 3 minutes as needed for decreased breathing, abnormal mental status  Please stop Ativan  Testing Required after Discharge -   Please obtain 72-hour ambulatory EEG  ** Please contact your PCP to request testing orders for any of the testing recommended here **  Important follow up information -   Follow-up with your PCP within 1 week of discharge  Please follow-up with psychiatry next Wednesday, April 24  Please follow-up with neurology, epilepsy specialist within 6 to 8 weeks  Please continue trying to establish yourself with a psychologist.  Other Instructions -   If you have another abnormal behavior spell please call 911 and have EMS transport you to [facility] for continuous video EEG monitoring to rule in or out these spells as seizures.  Please review this entire after visit  summary as additional general instructions including medication list, appointments, activity, diet, any pertinent wound care, and other additional recommendations from your care team that may be provided for you.      Sincerely,     Quique Hernandez, DO
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
## 2024-04-19 NOTE — QUICK NOTE
From my initial evaluation of this patient, he denied any drug use. Known history of fentanyl abuse back in 2021.    2:30AM:  UDS came back positive for amphetamine, fentanyl. Negative for benzo.  Conflicted with the history patient provided to me.  Patient currently sleeping along with wife in the same room. Do not intend to confront with patient at this moment.    Based on the presentation from wife- Patient's COWS score was at least 24 before he came in the hospital. During my initial evaluation: COWS score 11.     Addendum:  Confirmed with patient and wife again at bedside in the AM- patient firmly denied he was not on any drugs, has never used amphetamine in his life. Wife also verbalized she witnessed patient to intake his prescription Ativan.    Will repeat UDS at this time to r/o lab error.

## 2024-04-19 NOTE — ASSESSMENT & PLAN NOTE
Based the chronicity, prior workups- more likely psychiatric features.  Ambulatory 72-hour EEG per neurology  If patient has another spell patient was advised to go to [facility] for continuous video EEG monitoring
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
## 2024-04-19 NOTE — PLAN OF CARE
Problem: SAFETY ADULT  Goal: Patient will remain free of falls  Description: INTERVENTIONS:  - Educate patient/family on patient safety including physical limitations  - Instruct patient to call for assistance with activity   - Consult OT/PT to assist with strengthening/mobility   - Keep Call bell within reach  - Keep bed low and locked with side rails adjusted as appropriate  - Keep care items and personal belongings within reach  - Initiate and maintain comfort rounds  - Make Fall Risk Sign visible to staff  - Apply yellow socks and bracelet for high fall risk patients  - Consider moving patient to room near nurses station  Outcome: Progressing  Goal: Maintain or return to baseline ADL function  Description: INTERVENTIONS:  -  Assess patient's ability to carry out ADLs; assess patient's baseline for ADL function and identify physical deficits which impact ability to perform ADLs (bathing, care of mouth/teeth, toileting, grooming, dressing, etc.)  - Assess/evaluate cause of self-care deficits   - Assess range of motion  - Assess patient's mobility; develop plan if impaired  - Assess patient's need for assistive devices and provide as appropriate  - Encourage maximum independence but intervene and supervise when necessary  - Involve family in performance of ADLs  - Assess for home care needs following discharge   - Consider OT consult to assist with ADL evaluation and planning for discharge  - Provide patient education as appropriate  Outcome: Progressing  Goal: Maintains/Returns to pre admission functional level  Description: INTERVENTIONS:  - Perform AM-PAC 6 Click Basic Mobility/ Daily Activity assessment daily.  - Set and communicate daily mobility goal to care team and patient/family/caregiver.   - Collaborate with rehabilitation services on mobility goals if consulted  - Out of bed for toileting  - Record patient progress and toleration of activity level   Outcome: Progressing     Problem: DISCHARGE  PLANNING  Goal: Discharge to home or other facility with appropriate resources  Description: INTERVENTIONS:  - Identify barriers to discharge w/patient and caregiver  - Arrange for needed discharge resources and transportation as appropriate  - Identify discharge learning needs (meds, wound care, etc.)  - Arrange for interpretive services to assist at discharge as needed  - Refer to Case Management Department for coordinating discharge planning if the patient needs post-hospital services based on physician/advanced practitioner order or complex needs related to functional status, cognitive ability, or social support system  Outcome: Progressing     Problem: Knowledge Deficit  Goal: Patient/family/caregiver demonstrates understanding of disease process, treatment plan, medications, and discharge instructions  Description: Complete learning assessment and assess knowledge base.  Interventions:  - Provide teaching at level of understanding  - Provide teaching via preferred learning methods  Outcome: Progressing

## 2024-04-19 NOTE — ED NOTES
Patient transported to X-ray via stretcher by xray tech. IVF's infusing with negative complications.     Jane Dunlap RN  04/18/24 2024

## 2024-04-20 LAB
ATRIAL RATE: 106 BPM
P AXIS: 79 DEGREES
PR INTERVAL: 136 MS
QRS AXIS: 74 DEGREES
QRSD INTERVAL: 80 MS
QT INTERVAL: 376 MS
QTC INTERVAL: 499 MS
T WAVE AXIS: 70 DEGREES
VENTRICULAR RATE: 106 BPM

## 2024-04-20 PROCEDURE — 93010 ELECTROCARDIOGRAM REPORT: CPT | Performed by: INTERNAL MEDICINE

## 2024-04-20 NOTE — DISCHARGE INSTRUCTIONS
Follow up with your outpatient specialists.     If you develop new or worsening symptoms, please return to the Emergency Department for further evaluation.
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
## 2024-04-20 NOTE — ED ATTENDING ATTESTATION
4/19/2024  I, Chetan Shukla DO, saw and evaluated the patient. I have discussed the patient with the resident/non-physician practitioner and agree with the resident's/non-physician practitioner's findings, Plan of Care, and MDM as documented in the resident's/non-physician practitioner's note, except where noted. All available labs and Radiology studies were reviewed.  I was present for key portions of any procedure(s) performed by the resident/non-physician practitioner and I was immediately available to provide assistance.       At this point I agree with the current assessment done in the Emergency Department.  I have conducted an independent evaluation of this patient a history and physical is as follows:    48-year-old male presents for evaluation of multiple physical tics, vocal tics, anxiety spectrum symptoms.  Had an outpatient EEG which was unremarkable.  He was admitted in February he was seen by neurology large differential was considered.  He has had heavy metal testing, serum testing including BEAR, Lyme all of which have been unremarkable.  Symptoms continue they seem to fluctuate day-to-day.  He has been on Zoloft as well as Ativan at a low dose however Zoloft was recently increased.    Patient was instructed to come to [facility] for video EEG if symptoms returned.    Impression: Multiple neurologic symptoms unclear etiology plan: Consult neurology for possible admission for video EEG.  I would strongly consider CSF studies for paraneoplastic syndrome/autoimmune encephalitis type syndromes.      ED Course         Critical Care Time  Procedures

## 2024-04-20 NOTE — ASSESSMENT & PLAN NOTE
Ibrahima Adams is a 48-year-old male with a history of substance abuse, anxiety, chronic back pain who [facility] for abnormal behavior including physical and vocal tics, repetitive behaviors, vomiting, and dissociation.     Patient has had multiple admissions for similar symptoms. Ad[facility] in February 2024 and had extensive workup completed in the hospital for a similar presentation. Of note the patient's CTH, MRI Brain with and without contrast, and UDS were all negative.  The patient also had CT chest/abdomen/pelvis which was also negative for any signs of malignancy.  Routine EEG was completed which was normal.    Admitted to [facility] on 4/18/24 - UDS was positive for Fentanyl and amphetamines. The patient denied any drug use so a UDS was repeated which remained positive for fentanyl but not for amphetamines. Patient was discharged home and an ambulatory EEG was ordered.    Plan was to admit the patient in the EMU for vEEG monitoring. The patient's episode resolved while waiting in the ED. The patient and his spouse decided to go home. We discussed the value of a vEEG vs ambulatory. The patient and his spouse stated that because his episode resolved they did not think he would have another one in the hospital. The patient insisted on going home even after extensive discussion and advising to stay. Patient stated he would just do the ambulatory EEG and did not want to be admitted.

## 2024-04-20 NOTE — ED PROVIDER NOTES
"History  Chief Complaint   Patient presents with    Medical Problem     Patient and wife reports having \"psychotic episodes\" where patient becomes disoriented and has \"tics like grunting, rubbing face or body spasms.\" Patient seen at [facility] for these episodes and discharged. Patient recommended to come here if another occurs due to eeg opportunities. Reports episode started this afternoon and continuing. Outpatient appointment Wednesday.      HPI    49 y/o M presenting for ongoing paroxysmal tic and behavior problems. Pt accompanied by wife who states pt was just discharged this morning from [facility] for same issues of tic and behavior issues. States they were told if he had further tic episodes to go to [facility] for possible vEEG. Wife concerned pt was having episode this evening again and wanted him to get evaluated for possible vEEG as was discussed during the recent admission. Wife states he had some medication adjustments and was otherwise referred for outpatient EEG. No known inciting events for these episodes.     Prior to Admission Medications   Prescriptions Last Dose Informant Patient Reported? Taking?   naloxone (NARCAN) 4 mg/0.1 mL nasal spray   No No   Sig: Administer 1 spray into a nostril. If no response after 2-3 minutes, give another dose in the other nostril using a new spray.   sertraline (ZOLOFT) 50 mg tablet   No No   Sig: Take 1 tablet (50 mg total) by mouth daily      Facility-Administered Medications: None       Past Medical History:   Diagnosis Date    Chronic back pain        Past Surgical History:   Procedure Laterality Date    WISDOM TOOTH EXTRACTION         Family History   Problem Relation Age of Onset    Cirrhosis Mother     Cholelithiasis Mother     Diabetes type II Father     Stroke Maternal Grandfather     Diabetes Paternal Grandfather      I have reviewed and agree with the history as documented.    E-Cigarette/Vaping    E-Cigarette Use Never User      E-Cigarette/Vaping Substances    " Nicotine No     THC No     CBD No     Flavoring No     Other No     Unknown No      Social History     Tobacco Use    Smoking status: Former     Types: Cigars    Smokeless tobacco: Never    Tobacco comments:     has a cigar about 2/month   Vaping Use    Vaping status: Never Used   Substance Use Topics    Alcohol use: Yes     Alcohol/week: 2.0 standard drinks of alcohol     Types: 1 Glasses of wine, 1 Cans of beer per week     Comment: 3 drinks/week    Drug use: Not Currently     Types: Marijuana, Cocaine, Oxycodone     Comment: snorted cocain about 10 years ago for 1 year        Review of Systems   Constitutional:  Negative for chills and fever.   HENT:  Negative for ear pain and sore throat.    Eyes:  Negative for pain and visual disturbance.   Respiratory:  Negative for cough and shortness of breath.    Cardiovascular:  Negative for chest pain and palpitations.   Gastrointestinal:  Negative for abdominal pain and vomiting.   Genitourinary:  Negative for dysuria and hematuria.   Musculoskeletal:  Negative for arthralgias and back pain.   Skin:  Negative for color change and rash.   Neurological:  Negative for seizures and syncope.   Psychiatric/Behavioral:  Positive for behavioral problems.    All other systems reviewed and are negative.      Physical Exam  ED Triage Vitals [04/19/24 2113]   Temperature Pulse Respirations Blood Pressure SpO2   (!) 96 °F (35.6 °C) 98 20 128/68 95 %      Temp Source Heart Rate Source Patient Position - Orthostatic VS BP Location FiO2 (%)   Temporal -- Sitting Left arm --      Pain Score       No Pain             Orthostatic Vital Signs  Vitals:    04/19/24 2113   BP: 128/68   Pulse: 98   Patient Position - Orthostatic VS: Sitting       Physical Exam  Vitals and nursing note reviewed.   Constitutional:       General: He is not in acute distress.     Appearance: He is well-developed. He is not toxic-appearing.   HENT:      Head: Normocephalic and atraumatic.      Right Ear: External  ear normal.      Left Ear: External ear normal.      Nose: Nose normal.      Mouth/Throat:      Pharynx: Oropharynx is clear. No oropharyngeal exudate or posterior oropharyngeal erythema.   Eyes:      Extraocular Movements: Extraocular movements intact.      Conjunctiva/sclera: Conjunctivae normal.      Pupils: Pupils are equal, round, and reactive to light.   Cardiovascular:      Rate and Rhythm: Regular rhythm. Tachycardia present.      Pulses: Normal pulses.      Heart sounds: Normal heart sounds. No murmur heard.     No friction rub. No gallop.   Pulmonary:      Effort: Pulmonary effort is normal. No respiratory distress.      Breath sounds: Normal breath sounds. No wheezing, rhonchi or rales.   Abdominal:      General: Abdomen is flat.      Palpations: Abdomen is soft.      Tenderness: There is no abdominal tenderness. There is no guarding or rebound.   Musculoskeletal:      Cervical back: Normal range of motion. No rigidity.      Right lower leg: No edema.      Left lower leg: No edema.   Skin:     General: Skin is warm and dry.      Capillary Refill: Capillary refill takes less than 2 seconds.   Neurological:      General: No focal deficit present.      Mental Status: He is alert.      Comments: Pt with frequent full body spasms lasting a second with vocal moan associated. He is otherwise alert and able to speak/answer some questions. No focal findings         ED Medications  Medications - No data to display    Diagnostic Studies  Results Reviewed       None                   No orders to display         Procedures  Procedures      ED Course  ED Course as of 04/20/24 0134   Fri Apr 19, 2024   4650 Neuro evaluating now                                       Medical Decision Making  47 y/o M with ongoing episodes of motor and vocal tics, behavior changes. VSS. Will c/s neuro to discuss possibilities of obtaining vEEG and to provide further recommendations. Neurology evaluated pt, waiting to hear back from EMU  for approval. While waiting, the pts tic episode resolved. Pt and wife frustrated that the episode was not able to be captured on EEG. Discussed the timeframe for setting up vEEG and approval process. Neurology recommending admission however given the resolution of the tic the pt and wife do not want to stay for admission. Risks/benefits discussed, pt is of sound mind and able to make decisions for self. They understand reasoning for admission and prefer to f/u outpatient at this point. They understand to return to the ED at any time for re-evaluation.           Disposition  Final diagnoses:   Transient tics   Spells of abnormal behavior     Time reflects when diagnosis was documented in both MDM as applicable and the Disposition within this note       Time User Action Codes Description Comment    4/19/2024  9:34 PM Maury Escobar [F95.0] Transient tics     4/19/2024  9:34 PM Maury Escobar [R46.89] Spells of abnormal behavior           ED Disposition       ED Disposition   Discharge    Condition   Stable    Date/Time   Sat Apr 20, 2024 12:57 AM    Comment   Ibrahima Adams discharge to home/self care.                   Follow-up Information       Follow up With Specialties Details Why Contact Info Additional Information    Charles Rice DO Family Medicine   [address] [address]  [phone]       [facility] Emergency Department Emergency Medicine  If symptoms worsen [address] [address]  [phone] [facility], [address], [place], [address]   [phone]            Discharge Medication List as of 4/20/2024 12:58 AM        CONTINUE these medications which have NOT CHANGED    Details   naloxone (NARCAN) 4 mg/0.1 mL nasal spray Administer 1 spray into a nostril. If no response after 2-3 minutes, give another dose in the other nostril using a new spray., Normal      sertraline (ZOLOFT)  50 mg tablet Take 1 tablet (50 mg total) by mouth daily, Starting Fri 4/19/2024, Until Sun 5/19/2024, Normal           No discharge procedures on file.    PDMP Review         Value Time User    PDMP Reviewed  Yes 4/19/2024 11:33 AM Sneha Cruz MD             ED Provider  Attending physically available and evaluated Ibrahima Adams. I managed the patient along with the ED Attending.    Electronically Signed by           Maury Escobar MD  04/20/24 0134

## 2024-04-22 ENCOUNTER — TELEPHONE (OUTPATIENT)
Dept: PSYCHIATRY | Facility: CLINIC | Age: 49
End: 2024-04-22

## 2024-04-22 DIAGNOSIS — Z71.89 COORDINATION OF COMPLEX CARE: Primary | ICD-10-CM

## 2024-04-22 NOTE — UTILIZATION REVIEW
NOTIFICATION OF ADMISSION DISCHARGE   This is a Notification of Discharge from [facility]. Please be advised that this patient has been discharge from our facility. Below you will find the admission and discharge date and time including the patient’s disposition.   UTILIZATION REVIEW CONTACT:  Jennifer Streeter  Utilization   Network Utilization Review Department  Phone: [phone] x [phone] listen to the prompts. All voicemails are confidential.  Email: [email or web address]     ADMISSION INFORMATION  PRESENTATION DATE: 4/18/2024  7:45 PM  OBERVATION ADMISSION DATE:   INPATIENT ADMISSION DATE: 4/18/24 10:41 PM   DISCHARGE DATE: 4/19/2024  4:32 PM   DISPOSITION:Home/Self Care    Network Utilization Review Department  ATTENTION: Please call with any questions or concerns to [phone] and carefully listen to the prompts so that you are directed to the right person. All voicemails are confidential.   For Discharge needs, contact Care Management DC Support Team at [phone] opt. 2  Send all requests for admission clinical reviews, approved or denied determinations and any other requests to dedicated fax number below belonging to the campus where the patient is receiving treatment. List of dedicated fax numbers for the Facilities:  FACILITY NAME UR FAX NUMBER   ADMISSION DENIALS (Administrative/Medical Necessity) [phone]   DISCHARGE SUPPORT TEAM ([facility]) [phone]   PARENT CHILD HEALTH (Maternity/NICU/Pediatrics) [phone]   [facility] [phone]   [facility] [phone]   Pending sale to [facility] [phone]   [facility] [phone]   [facility] [phone]   [facility] [phone]   [facility] [phone]   [facility] [phone]    [facility] [phone]   [facility] [phone]   [facility] [phone]   [facility] [phone]

## 2024-04-23 ENCOUNTER — PATIENT OUTREACH (OUTPATIENT)
Dept: FAMILY MEDICINE CLINIC | Facility: CLINIC | Age: 49
End: 2024-04-23

## 2024-04-24 ENCOUNTER — TELEPHONE (OUTPATIENT)
Dept: PSYCHIATRY | Facility: CLINIC | Age: 49
End: 2024-04-24

## 2024-04-24 ENCOUNTER — PATIENT OUTREACH (OUTPATIENT)
Dept: FAMILY MEDICINE CLINIC | Facility: CLINIC | Age: 49
End: 2024-04-24

## 2024-04-24 ENCOUNTER — OFFICE VISIT (OUTPATIENT)
Dept: PSYCHIATRY | Facility: CLINIC | Age: 49
End: 2024-04-24

## 2024-04-24 DIAGNOSIS — R46.89 SPELL OF ABNORMAL BEHAVIOR: ICD-10-CM

## 2024-04-24 DIAGNOSIS — F32.A DEPRESSIVE DISORDER: ICD-10-CM

## 2024-04-24 DIAGNOSIS — F41.1 GENERALIZED ANXIETY DISORDER: Primary | ICD-10-CM

## 2024-04-24 RX ORDER — TRAZODONE HYDROCHLORIDE 50 MG/1
50 TABLET ORAL
Qty: 30 TABLET | Refills: 1 | Status: SHIPPED | OUTPATIENT
Start: 2024-04-24

## 2024-04-24 RX ORDER — BUSPIRONE HYDROCHLORIDE 10 MG/1
10 TABLET ORAL 3 TIMES DAILY
Qty: 60 TABLET | Refills: 1 | Status: SHIPPED | OUTPATIENT
Start: 2024-04-24

## 2024-04-24 NOTE — TELEPHONE ENCOUNTER
Called and left message for patient to return a call to [phone] and schedule 5 week follow up with provider (5/24/24). Please schedule upon return call. Thank you.

## 2024-04-24 NOTE — PSYCH
Psychiatric Evaluation - Behavioral Health     Identification Data:Ibrahima Adams 48 y.o. male MRN: [id number]      Chief Complaint:   Episodes of unawareness of surroundings and depressive symptoms  History of present illness:    Patient is a 47 y/o   male. Since last year he has had episodes of losing track of time with involuntary movements. Sometimes he is aware of his surroundings but often, he is not he also makes sounds and talks.  His wife provided a video of 1 of these episodes.  The patient was clenching his fists and tightening his entire body and then would curse.  Other episodes have been associated with profuse sweating.  So far no explanation has been found.  He also reports that he has been under stress since last few months because of losing his job due to frequent absenteeism and calling in sick.  He has had depressive symptoms such as sadness and low energy level and loss of interest and diminished libido.  His wife also reports that Ibrahima has been spending a lot of time on the Internet, probably gambling but he indicates that these are mostly free games.  Denies usage of drugs or alcohol.  The most recent urine drug screen from 5 days ago was positive for methamphetamine and also fentanyl but he denies using either one of them.  A repeat test was negative for fentanyl but positive for methamphetamine although he adamantly denies using any drugs.  His wife informed me that she has made it very clear to Ibrahima that if he does drugs again, she will divorce him.    Psychiatric Review Of Systems:  Change in sleep: yes  Appetite changes: no  Weight changes: no  Change in energy/anergy: Was low before but improved  Change in interest/pleasure/anhedonia: Has improved  Somatic symptoms: yes  Anxiety/panic: yes  Manic symptoms: no  Guilt feelings:no  Hopeless: no  Self injurious behavior/risky behavior: no    Historical Information     Past Psychiatric History:   No inpatient or outpatient  treatment history  No suicidal tendencies.  He has not had any psychotic symptoms, mood swings or aggressive tendencies    Substance Abuse History:    History of Fentanyl use until 2021. Quit after going to outpatient rehab. When younger, drank heavily but not for years.Denies usage of stimulants at anytime. Used cocaine in the past  Family Psychiatric History:   His mother and maternal grandfather had severe alcohol problem  Cousin committed suicide with a substance use history  Social History:  Developmental:uneventful  Education: some college  Marital history:  x 2 years but had been living with his wife for 9 years prior to that.  They have no children  Living arrangement, social support: wife  Occupational History: unemployed-for the most part he has been a  but he has also been a  in the past  Access to firearms:No    Traumatic History:   Abuse:none is reported  Other Traumatic Events: None    Past Medical History:   Diagnosis Date    Chronic back pain        Medical Review Of Systems:  Pertinent items are noted in HPI.    Meds/Allergies   all current active meds have been reviewed  No Known Allergies  Objective      Mental Status Evaluation:  Appearance:  {Adequate hygiene and grooming and Good eye contact   Behavior Cooperative   Speech:   Language Normal volume and Normal rate  No overt abnormality   Mood:  Anxious and Dysphoric   Affect:   Thought process appropriate  Goal directed and coherent   Associations: Tightly connected   Thought Content:  Does not verbalize delusional material   Perceptual Disturbances: Denies hallucinations and does not appear to be responding to internal stimuli   Risk Potential: No suicidal or homicidal ideation   Orientation  Oriented x 3   Memory grossly intact   Attention/Concentration attention span appeared shorter than expected for age   Fund of knowledge vocabulary Average   Insight:  Good insight   Judgment: Good judgment   Gait/Station: normal  gait/station and normal balance   Motor Activity: No abnormal movement noted        Diagnosis ICD-10-CM Associated Orders   1. Generalized anxiety disorder  F41.1 Ambulatory referral to Psych Services     busPIRone (BUSPAR) 10 mg tablet     traZODone (DESYREL) 50 mg tablet     sertraline (ZOLOFT) 50 mg tablet      2. Spells of abnormal behavior  R46.89 Ambulatory referral to Psych Services      3. Depressive disorder  F32.A traZODone (DESYREL) 50 mg tablet     sertraline (ZOLOFT) 50 mg tablet               Plan: Continue with sertraline 50 mg daily.  Add buspirone 10 mg 3 times daily and trazodone 50 mg nightly.  Follow up in a few weeks  Risks, benefits and possible side effects of Medications:   Risks, benefits, and possible side effects of medications explained to patient and patient verbalizes understanding.

## 2024-04-24 NOTE — PROGRESS NOTES
Telephone call to patient, left message on machine with my name, number and request for return call.

## 2024-04-24 NOTE — PSYCH
"TREATMENT PLAN (Medication Management Only)        [facility]    Name/Date of Birth/MRN:  Ibrahima Adams 48 y.o. 1975 MRN: [id number]  Date of Treatment Plan: April 24, 2024  Diagnosis/Diagnoses:   1. Generalized anxiety disorder    2. Spells of abnormal behavior    3. Depressive disorder      Strengths/Personal Resources for Self-Care: supportive family, taking medications as prescribed, ability to adapt to life changes, ability to communicate needs, family ties.  Area/Areas of need (in own words): Unexplained behaviors, anxiety, depressive symptoms.  1. Long Term Goal:   Maintain control of \"Unexplained behaviors\", maintain control of anxiety, remission of depression  Target Date: 2 months - 6/24/2024  Person/Persons responsible for completion of goal: Ibrahima and   2.  Short Term Objective (s) - How will we reach this goal?:   A.  Provider new recommended medication/dosage changes and/or continue medication(s): continue current medications as prescribed.  B.  N/A.  C.  N/A.  Target Date: 3 months - 7/24/2024  Person/Persons Responsible for Completion of Goal: Ibrahima and    Progress Towards Goals: initiating treatment, continuing treatment  Treatment Modality: medication management every 6 weeks  Review due 180 days from date of this plan: 6 months - 10/24/2024  Expected length of service: maintenance unless revised  My Physician/PA/NP and I have developed this plan together and I agree to work on the goals and objectives. I understand the treatment goals that were developed for my treatment.  Electronic Signatures: on file (unless signed below)    Jimmie Hung MD 04/24/24  "

## 2024-04-26 ENCOUNTER — TELEPHONE (OUTPATIENT)
Dept: PSYCHIATRY | Facility: CLINIC | Age: 49
End: 2024-04-26

## 2024-04-26 ENCOUNTER — TELEPHONE (OUTPATIENT)
Age: 49
End: 2024-04-26

## 2024-04-26 ENCOUNTER — TELEPHONE (OUTPATIENT)
Dept: NEUROLOGY | Facility: CLINIC | Age: 49
End: 2024-04-26

## 2024-04-26 NOTE — TELEPHONE ENCOUNTER
Ibrahima's wife called, she wants to know if Dr. Rice wants to follow up with Ibrahima after his ED visit, or if he should follow up with his specialist appointments he has coming up.   Please call Mornea, his wife back to set up the appointment.

## 2024-04-26 NOTE — TELEPHONE ENCOUNTER
Morena left message that Ibrahima was put on new medication and she wants to know how long it takes to take effect.  Ibrahima started with episodes on 4/24/24 in the middle of the night and he is having episode now.  She is requesting call  back.

## 2024-04-26 NOTE — TELEPHONE ENCOUNTER
CRISSY/4/18-4/19/Spells of abnormal behavior     Ibrahima Adams will need follow up in 6-8 weeks with epilepsy attending. He will require a ambulatory EEG within 2-4 weeks.       [facility]U scheduled for 5/17/24 @ 10:00 with Dr. Medina in [place]

## 2024-04-26 NOTE — TELEPHONE ENCOUNTER
"Nurse spoke with Morena (wife) - Morena wanted Dr. Hung to know that Jb had another \"episode\", started on Wednesday night and continued through Thursday afternoon - briefly stopped a few  hours and then started back up on Thursday night and lasted until late Friday morning.    Pupils become small, face is red, becomes sweaty, shouting and swearing, becomes disoriented.   Morena states they have an appointment with neurology on 5/17 and the at home EEG is on 6/27.      "

## 2024-04-29 NOTE — TELEPHONE ENCOUNTER
He is scheduled for follow-up with psychiatry as well as neurology.  There is nothing more that I can add to his workup or recommendation

## 2024-05-02 ENCOUNTER — PATIENT OUTREACH (OUTPATIENT)
Dept: FAMILY MEDICINE CLINIC | Facility: CLINIC | Age: 49
End: 2024-05-02

## 2024-05-02 NOTE — LETTER
Date: 05/02/24    Dear Ibrahima Adams,   My name is Candi; I am a registered nurse care manager working with [facility].   I have not been able to reach you and would like to set a time that I can talk with you over the phone.  My work is to help patients that have complex medical conditions get the care they need. This includes patients who may have been in the hospital or emergency room.    I have enclosed information for you. Please call me with any questions you may have. I look forward to speaking with you.  Sincerely,  Candi Fernando RN  [phone]  Outpatient Care Manager

## 2024-05-14 ENCOUNTER — TELEPHONE (OUTPATIENT)
Dept: NEUROLOGY | Facility: CLINIC | Age: 49
End: 2024-05-14

## 2024-05-14 ENCOUNTER — PATIENT OUTREACH (OUTPATIENT)
Dept: FAMILY MEDICINE CLINIC | Facility: CLINIC | Age: 49
End: 2024-05-14

## 2024-05-14 NOTE — TELEPHONE ENCOUNTER
Made an appt reminder call no answer lmom. Appt with dr Medina  on 5/17/24 @10:00am in [facility].

## 2024-05-14 NOTE — PROGRESS NOTES
Unable to reach pt by phone x 2 and letter. Episode closed at this time, pt provided my contact information if future assistance is needed.

## 2024-05-17 ENCOUNTER — TELEPHONE (OUTPATIENT)
Dept: NEUROLOGY | Facility: CLINIC | Age: 49
End: 2024-05-17

## 2024-05-17 ENCOUNTER — OFFICE VISIT (OUTPATIENT)
Dept: NEUROLOGY | Facility: CLINIC | Age: 49
End: 2024-05-17
Payer: COMMERCIAL

## 2024-05-17 VITALS
RESPIRATION RATE: 16 BRPM | SYSTOLIC BLOOD PRESSURE: 112 MMHG | DIASTOLIC BLOOD PRESSURE: 68 MMHG | HEART RATE: 66 BPM | WEIGHT: 171.2 LBS | HEIGHT: 73 IN | TEMPERATURE: 97.2 F | BODY MASS INDEX: 22.69 KG/M2 | OXYGEN SATURATION: 98 %

## 2024-05-17 DIAGNOSIS — R46.89 SPELL OF ABNORMAL BEHAVIOR: ICD-10-CM

## 2024-05-17 DIAGNOSIS — F45.9 SOMATOFORM DISORDER, UNSPECIFIED: ICD-10-CM

## 2024-05-17 DIAGNOSIS — G40.909 RECURRENT SEIZURES (HCC): Primary | ICD-10-CM

## 2024-05-17 PROBLEM — N17.9 AKI (ACUTE KIDNEY INJURY) (HCC): Status: RESOLVED | Noted: 2024-04-19 | Resolved: 2024-05-17

## 2024-05-17 PROCEDURE — 99215 OFFICE O/P EST HI 40 MIN: CPT | Performed by: PSYCHIATRY & NEUROLOGY

## 2024-05-17 PROCEDURE — 99417 PROLNG OP E/M EACH 15 MIN: CPT | Performed by: PSYCHIATRY & NEUROLOGY

## 2024-05-17 NOTE — PROGRESS NOTES
Review of Systems   Constitutional:  Negative for appetite change, fatigue and fever.   HENT: Negative.  Negative for hearing loss, tinnitus, trouble swallowing and voice change.    Eyes: Negative.  Negative for photophobia, pain and visual disturbance.   Respiratory: Negative.  Negative for shortness of breath.    Cardiovascular: Negative.  Negative for palpitations.   Gastrointestinal: Negative.  Negative for nausea and vomiting.   Endocrine: Negative.  Negative for cold intolerance.   Genitourinary: Negative.  Negative for dysuria, frequency and urgency.   Musculoskeletal:  Negative for back pain, gait problem, myalgias, neck pain and neck stiffness.   Skin: Negative.  Negative for rash.   Allergic/Immunologic: Negative.    Neurological:  Positive for seizures (not sure what it is he had eeg schdule end of june) and syncope (he has 2 times episode last week). Negative for dizziness, tremors, facial asymmetry, speech difficulty, weakness, light-headedness, numbness and headaches.   Hematological: Negative.  Does not bruise/bleed easily.   Psychiatric/Behavioral:  Positive for sleep disturbance. Negative for confusion and hallucinations.    All other systems reviewed and are negative.
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
## 2024-05-17 NOTE — TELEPHONE ENCOUNTER
----- Message from Roger Medina MD sent at 5/17/2024 11:19 AM EDT -----  Regarding: community resources  Mr. Adams is having near daily episodes of altered awareness, seizure like events that I suspect are functional (conversion disorder, nonepileptic).  He lives with his wife, who is the only employed person in the house hold.  She is worried about his safety when he is home alone.  Can you assist with him applying for medical disability for a functional disorder and having a home attendant for a few hours a day to make sure that he is safe?  I would say this can be temporary for 3-6 months until his diagnosis is clarified.  His primary disorders are psychiatric with generalized anxiety with panic attacks.    Teresa
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
## 2024-05-17 NOTE — PATIENT INSTRUCTIONS
Plan:   Recurrent seizure like or tic like behaviors with impaired awareness   Less likely to be epileptic seizures based on the clinical features but we need to rule out frontal lobe seizures  - the repetitive movements and age of onset is not consistent with either a tic disorder or Tourette's  - frontal lobe seizures may have bizarre clinical features of repetitive behaviors and cursing but generally are brief and semiologically similar event to event and not so much variability.  These could also be an underlying functional neurological symptoms disorder (F44.5) previously known as conversion disorder as part of the somatoform disorders.    - recommend referral to the epilepsy monitoring unit (EMU) for video EEG monitoring  - may have the 72 hours ambulatory EEG as a back up if no clinical event is captured in the EMU.  - follow-up in 2-3 months with advanced practitioner     Functional neurological disorders  - continue to follow-up with your therapist  - follow-up with psychiatry to manage underlying anxiety disorder.    Video Electroencephalopathy (VEEG) and the Epilepsy Monitoring Unit (EMU)    What is video EEG?  In video-EEG, you are video taped at the same time as your brain waves are recorded.   This typically occurs in the hospital over a long period of time, often an average of 3-7 days.   The doctor is able to review both the video and EEG at the same time to see exactly what your brain waves are doing while watching what your body is doing.     Why do I need video-EEG?  Typically the reasons to have a video-EEG study are to make a diagnosis, classify the type of seizures and epilepsy, and if medications do not prevent the seizures then offer an alternative therapy (pre-surgical evaluation).   Some people have events that look like an epileptic seizure (caused by electrical storm of the brain); but are in fact not due to abnormal electrical activity in the brain.  Other causes include cardiac or  vascular pathology, other neurological causes such as tics and movement disorders, or psychological / psychogenic nonepileptic events.  There are also many different kinds of epileptic seizures.  The video EEG will help classify the seizures and epilepsy syndrome to determine the best medications or treatments.   Nearly a third of epilepsy patients are medically refractory (failed 2 or more appropriate anti-seizure medications) and epilepsy surgery may be an option for these patients. VEEG is used to evaluate where seizures start in the brain, determine if surgery may be possible and guide the specific surgical approach for patients who are found to be surgical candidates.     What to expect in the Epilepsy Monitoring Unit (EMU)  The EMU is a specialized inpatient unit in the hospital specially designed for evaluation of seizure disorders. The unit is equipped with computer-based monitoring equipment, certified EEG technologists, trained nurses, and attending physicians trained in the management of epilepsy.   In the EMU, seizures are recorded and specially reviewed so that a proper diagnosis can be made and treatment can be optimized.   Each patient will have a private room and a private bathroom.   Patients will be connected to video-EEG equipment continuously (24 hours a day).   There is no video recording while in the restroom, but brain waves are recorded on the EEG at all times.   Although this can be bothersome, continuous monitoring at all times is necessary to make sure patients are safe and that the necessary information is collected.   Because patients are connected to recording equipment, mobility is restricted to the patient’s room.    Patients are not be able to take a shower or wash your hair while on EEG monitoring  This would interfere with your EEG electrodes.   Washing with a basin or sink is permitted.   Patients are asked to activate a push button when they experience an event and then state  aloud what they are experiencing.   Nurses will test the patient during an event to help the doctors see what is occurring.  To increase the chance of capturing a seizure in a limited amount of time a variety of “activation procedures” are employed.  Sleep deprivation (no naps during the day and attempts to keep you awake all night as often as every other night)  Photic stimulation and hyperventilation procedures  Weaning or withdrawal of medications used to control seizures (the attending physician will discuss this with you).   You will need to have a peripheral IV placed in your hand or arm. This allows the doctors to give “rescue” medications in the event of a medical emergency while you are in the hospital. You also may be given a blood thinner in the form of daily subcutaneous injections to prevent deep venous thrombosis (DVT).  The hospital is a non-smoking facility, therefore smoking is not allowed.  Chewing gum is not allowed, this creates artifact on the EEG.  If you are a woman of childbearing age, you may be asked to take a urine pregnancy test.  We strive to make the EMU as safe as possible.  Throughout the world video EEG monitoring is the standard of care and thousands of patients have been admitted to epilepsy monitoring units and there are rare cases of complications due to severely difficult to control epilepsy.  These have included seizure clusters, status epilepticus (seizures that do not stop with typical medications and advanced measures are required), injuries (fractures and head injury), and very rarely death.    How long do patients stay in the EMU?  The length of time varies for each patient.   Prepare to stay about 1 week (even though it may not last that long).  Typically, patients stay between 3-7 days, but may stay more or less time in special circumstances.     Things you can do to prepare for admission  Take a shower and wash your hair the night before or the morning of admission.    Do not use any hair products such as gels, sprays, mousse, or hair weaves.  It is NOT necessary to cut your hair or shave your head for the test.   Unless you received specific instructions from your physician, continue to take your medications as you normally do, including the morning of your admission.  Bring all of your current medications in the original prescription bottles to the hospital (some specialty medications may not be available in the hospital).   Bring a complete list of your medical and surgical history  Bring your seizure calendar  Wear comfortable clothing or pajamas.   Button-down shirts and elastic-waist pants are preferred.   You can bring slippers or sneakers for when you are walking around the room.   You can bring activities like computers, phones, cards, music, movies, etc. with you to keep you occupied.   Electronic devices cannot be plugged in while you are touching them (this interferes with the equipment), but can be charging on the other side of the room.   If you need any medical devices (such as a CPAP for obstructive sleep apnea), please bring it to the hospital.  Non-Epileptic Psychogenic Spells (NEPS)    What are non-epileptic psychogenic spells?   Non-epileptic psychogenic spells (NEPS), sometimes called non-epileptic attack, nervous spells, pseudo-seizures, or stress spells, are behavioral events that look like epileptic seizures to an observer, but are not actually epileptic seizures.    What is the difference between epileptic seizures and NEPS?   Epileptic seizures can be described as an electrical storm in the brain.  The electrical storm causes the symptoms of the seizure (shaking, unresponsiveness, etc) and shows up as abnormal changes in brain waves on EEG monitoring.    Non-epileptic psychogenic spells (NEPS) are not due to an electrical storm in the brain and brain waves on EEG monitoring remain normal during NEPS. They are still real, uncontrollable events that can  cause a person to be unresponsive and sometimes shake, but they are not caused by abnormal electrical signals in the brain.     How is the diagnosis of NEPS made?   In most cases, the events must be captured in the act on EEG while patients are monitored on video-EEG, like in an Epilepsy Monitoring Unit.   This allows doctors to see both what the patient is doing during the event and monitor the brain waves to look for the presence or absence of abnormal brain waves (the electrical storm).     Why is it important to diagnose NEPS?  The treatment for epileptic seizures and non-epileptic psychogenic spells are very different.  Seizure medications are designed to treat electrical storms of the brain, and don’t work in NEPS because an electrical storm is not the problem.   By confirming the diagnosis of NEPS, patients can avoid unnecessary medications or procedures and focus on treatments specific for NEPS with a mental health provider.     How common are NEPS?  The exact prevalence of NEPS in the population is not known, but a third to a half of patients seen in Epilepsy monitoring units are diagnosed with NEPS.    What causes NEPS?   Sometimes the exact cause of NEPS is difficult to determine.    Common causes are depression, anxiety, post-traumatic stress disorder, family conflict, or a history of abuse.   Talking with a mental health provider is the best way to try to identify the cause.     What if an underlying cause can’t be found?   Counseling from a certified counselor, psychologist or psychiatrist is helpful even if a specific cause of NEPS cannot be found.  Mental health professionals can teach patients techniques for dealing with the spells.   What are the treatment options for NEPS?   Treatment begins with evaluation by a mental health specialist, including psychologists, licensed counselors, and psychiatrists. They will work to identify a cause if possible, and then focus treatments for that cause.    Remember, often, no clear cause can be found. Working with a mental health provider is still important.  Treatments include counseling, medications, and strategies to cope/deal with the spells.   Some strategies to cope/deal with the spells include: relaxation training, visualization, biofeedback, and active problem solving skills.    Are they faked or done purposely?   No. NEPS are not “faked”.   What happens during a non-epileptic psychogenic spell is out of the control of the patient.   Working with a mental health provider can help get these events back under a person’s control.    Can the brain be damaged from NEPS?   No. The brain has normal electrical activity during a NEPS.   People can be injured if they fall during an event, so diagnosis and correct treatment are very important.       Can NEPS affect a person’s activities of daily living?   Even though non-epileptic psychogenic spells are not epileptic seizures, the spells can affect people’s daily lives.    When a spell occurs, patients should stop what you are doing until the spell passes.   After the spell stops, he/she can then resume their regular activities as soon as they are able.     Can people with epilepsy also have NEPS?   Yes. It is possible for people with epilepsy to also have NEPS.  EEG monitoring can determine which spells are epileptic seizures and which are NEPS.     Where can I find a psychologist?   Often, your primary care doctor or the doctors in the Epilepsy monitoring unit can help direct you to a mental health provider.   The American Psychological Association (APA) has a “Psychologist ” (http://.apa.org/).  Enter your zip code and “dissociative disorders” in the area of specialization field to find the names and contact information of some mental health professionals in your area.        For more information about NEPS, you can visit the following  website:  http://www.nonepilepticseizures.com  https://www.fndhope.org    Book with information and tools for management of NEPS:   Betty Rodriguez. (2014) Psychogenic Non-epileptic Seizures: A Guide. [employer]

## 2024-05-22 ENCOUNTER — TELEPHONE (OUTPATIENT)
Dept: NEUROLOGY | Facility: CLINIC | Age: 49
End: 2024-05-22

## 2024-05-22 DIAGNOSIS — R56.9 SEIZURE-LIKE ACTIVITY (HCC): Primary | ICD-10-CM

## 2024-05-23 ENCOUNTER — TELEPHONE (OUTPATIENT)
Dept: PSYCHIATRY | Facility: CLINIC | Age: 49
End: 2024-05-23

## 2024-05-23 DIAGNOSIS — F32.A DEPRESSIVE DISORDER: ICD-10-CM

## 2024-05-23 DIAGNOSIS — F41.1 GENERALIZED ANXIETY DISORDER: ICD-10-CM

## 2024-05-23 NOTE — TELEPHONE ENCOUNTER
Patients wife  contacted the office to schedule a follow up visit with provider. Patient is now scheduled for 8/14/2024  at 10:30am in office.     Patients wife  contacted the office to schedule a follow up visit with provider. Patient is now scheduled for 9/16/2024  at 10am in office.

## 2024-05-23 NOTE — TELEPHONE ENCOUNTER
Ibrahima Adams called the clinic today and appropriately requested refill of psychotropic medication (ZOloft). As such, will refill script today for 30-days as I am covering for patient's primary provider.

## 2024-05-23 NOTE — TELEPHONE ENCOUNTER
Medication Refill Request     Name of Medication zoloft  Dose/Frequency 50mg take 1 tablet by mouth daily at bedtime  Quantity 30  Verified pharmacy   [x]  Verified ordering Provider   [x]  Does patient have enough for the next 3 days? Yes [x] No []  Does patient have a follow-up appointment scheduled? Yes [x] No []   If so when is appointment: 7/10/2024 10:30am

## 2024-05-29 ENCOUNTER — APPOINTMENT (INPATIENT)
Dept: NEUROLOGY | Facility: CLINIC | Age: 49
DRG: 880 | End: 2024-05-29
Payer: COMMERCIAL

## 2024-05-29 ENCOUNTER — HOSPITAL ENCOUNTER (INPATIENT)
Facility: HOSPITAL | Age: 49
LOS: 3 days | Discharge: HOME | DRG: 880 | End: 2024-06-01
Attending: PSYCHIATRY & NEUROLOGY | Admitting: PSYCHIATRY & NEUROLOGY
Payer: COMMERCIAL

## 2024-05-29 DIAGNOSIS — F44.5 PSYCHOGENIC NONEPILEPTIC SEIZURE: Primary | ICD-10-CM

## 2024-05-29 PROBLEM — R56.9 SEIZURE-LIKE ACTIVITY (HCC): Status: ACTIVE | Noted: 2024-05-29

## 2024-05-29 PROBLEM — R11.15 CYCLICAL VOMITING: Status: RESOLVED | Noted: 2024-01-29 | Resolved: 2024-05-29

## 2024-05-29 PROCEDURE — 95700 EEG CONT REC W/VID EEG TECH: CPT

## 2024-05-29 PROCEDURE — 95715 VEEG EA 12-26HR INTMT MNTR: CPT

## 2024-05-29 PROCEDURE — 93005 ELECTROCARDIOGRAM TRACING: CPT

## 2024-05-29 PROCEDURE — 99223 1ST HOSP IP/OBS HIGH 75: CPT | Performed by: PSYCHIATRY & NEUROLOGY

## 2024-05-29 RX ORDER — BUSPIRONE HYDROCHLORIDE 10 MG/1
10 TABLET ORAL 3 TIMES DAILY
Status: DISCONTINUED | OUTPATIENT
Start: 2024-05-29 | End: 2024-06-01 | Stop reason: HOSPADM

## 2024-05-29 RX ORDER — TRAZODONE HYDROCHLORIDE 50 MG/1
50 TABLET ORAL
Status: DISCONTINUED | OUTPATIENT
Start: 2024-05-29 | End: 2024-06-01 | Stop reason: HOSPADM

## 2024-05-29 RX ORDER — ONDANSETRON 2 MG/ML
4 INJECTION INTRAMUSCULAR; INTRAVENOUS EVERY 6 HOURS PRN
Status: DISCONTINUED | OUTPATIENT
Start: 2024-05-29 | End: 2024-06-01 | Stop reason: HOSPADM

## 2024-05-29 RX ORDER — LORAZEPAM 2 MG/ML
2 INJECTION INTRAMUSCULAR EVERY 8 HOURS PRN
Status: DISCONTINUED | OUTPATIENT
Start: 2024-05-29 | End: 2024-06-01 | Stop reason: HOSPADM

## 2024-05-29 RX ADMIN — BUSPIRONE HYDROCHLORIDE 10 MG: 10 TABLET ORAL at 20:34

## 2024-05-29 RX ADMIN — BUSPIRONE HYDROCHLORIDE 10 MG: 10 TABLET ORAL at 17:35

## 2024-05-29 NOTE — PLAN OF CARE
Problem: PAIN - ADULT  Goal: Verbalizes/displays adequate comfort level or baseline comfort level  Description: Interventions:  - Encourage patient to monitor pain and request assistance  - Assess pain using appropriate pain scale  - Administer analgesics based on type and severity of pain and evaluate response  - Implement non-pharmacological measures as appropriate and evaluate response  - Consider cultural and social influences on pain and pain management  - Notify physician/advanced practitioner if interventions unsuccessful or patient reports new pain  Outcome: Progressing     Problem: INFECTION - ADULT  Goal: Absence or prevention of progression during hospitalization  Description: INTERVENTIONS:  - Assess and monitor for signs and symptoms of infection  - Monitor lab/diagnostic results  - Monitor all insertion sites, i.e. indwelling lines, tubes, and drains  - Monitor endotracheal if appropriate and nasal secretions for changes in amount and color  - [place] appropriate cooling/warming therapies per order  - Administer medications as ordered  - Instruct and encourage patient and family to use good hand hygiene technique  - Identify and instruct in appropriate isolation precautions for identified infection/condition  Outcome: Progressing  Goal: Absence of fever/infection during neutropenic period  Description: INTERVENTIONS:  - Monitor WBC    Outcome: Progressing     Problem: SAFETY ADULT  Goal: Patient will remain free of falls  Description: INTERVENTIONS:  - Educate patient/family on patient safety including physical limitations  - Instruct patient to call for assistance with activity   - Consult OT/PT to assist with strengthening/mobility   - Keep Call bell within reach  - Keep bed low and locked with side rails adjusted as appropriate  - Keep care items and personal belongings within reach  - Initiate and maintain comfort rounds  - Make Fall Risk Sign visible to staff  - Offer Toileting every 3 Hours,  in advance of need  - Initiate/Maintain bed alarm  - Obtain necessary fall risk management equipment:   - Apply yellow socks and bracelet for high fall risk patients  - Consider moving patient to room near nurses station  Outcome: Progressing  Goal: Maintain or return to baseline ADL function  Description: INTERVENTIONS:  -  Assess patient's ability to carry out ADLs; assess patient's baseline for ADL function and identify physical deficits which impact ability to perform ADLs (bathing, care of mouth/teeth, toileting, grooming, dressing, etc.)  - Assess/evaluate cause of self-care deficits   - Assess range of motion  - Assess patient's mobility; develop plan if impaired  - Assess patient's need for assistive devices and provide as appropriate  - Encourage maximum independence but intervene and supervise when necessary  - Involve family in performance of ADLs  - Assess for home care needs following discharge   - Consider OT consult to assist with ADL evaluation and planning for discharge  - Provide patient education as appropriate  Outcome: Progressing  Goal: Maintains/Returns to pre admission functional level  Description: INTERVENTIONS:  - Perform AM-PAC 6 Click Basic Mobility/ Daily Activity assessment daily.  - Set and communicate daily mobility goal to care team and patient/family/caregiver.   - Collaborate with rehabilitation services on mobility goals if consulted  - Perform Range of Motion 3 times a day.  - Reposition patient every 3 hours.  - Dangle patient 3 times a day  - Stand patient 3 times a day  - Ambulate patient 3 times a day  - Out of bed to chair 3 times a day   - Out of bed for meals 3 times a day  - Out of bed for toileting  - Record patient progress and toleration of activity level   Outcome: Progressing     Problem: DISCHARGE PLANNING  Goal: Discharge to home or other facility with appropriate resources  Description: INTERVENTIONS:  - Identify barriers to discharge w/patient and caregiver  -  Arrange for needed discharge resources and transportation as appropriate  - Identify discharge learning needs (meds, wound care, etc.)  - Arrange for interpretive services to assist at discharge as needed  - Refer to Case Management Department for coordinating discharge planning if the patient needs post-hospital services based on physician/advanced practitioner order or complex needs related to functional status, cognitive ability, or social support system  Outcome: Progressing     Problem: Knowledge Deficit  Goal: Patient/family/caregiver demonstrates understanding of disease process, treatment plan, medications, and discharge instructions  Description: Complete learning assessment and assess knowledge base.  Interventions:  - Provide teaching at level of understanding  - Provide teaching via preferred learning methods  Outcome: Progressing
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
## 2024-05-30 ENCOUNTER — APPOINTMENT (INPATIENT)
Dept: NEUROLOGY | Facility: CLINIC | Age: 49
DRG: 880 | End: 2024-05-30
Payer: COMMERCIAL

## 2024-05-30 ENCOUNTER — TELEPHONE (OUTPATIENT)
Dept: NEUROLOGY | Facility: CLINIC | Age: 49
End: 2024-05-30

## 2024-05-30 PROCEDURE — 99232 SBSQ HOSP IP/OBS MODERATE 35: CPT | Performed by: PSYCHIATRY & NEUROLOGY

## 2024-05-30 PROCEDURE — 95720 EEG PHY/QHP EA INCR W/VEEG: CPT | Performed by: PSYCHIATRY & NEUROLOGY

## 2024-05-30 PROCEDURE — 95715 VEEG EA 12-26HR INTMT MNTR: CPT

## 2024-05-30 RX ADMIN — BUSPIRONE HYDROCHLORIDE 10 MG: 10 TABLET ORAL at 15:57

## 2024-05-30 RX ADMIN — SERTRALINE HYDROCHLORIDE 50 MG: 50 TABLET ORAL at 08:18

## 2024-05-30 RX ADMIN — BUSPIRONE HYDROCHLORIDE 10 MG: 10 TABLET ORAL at 08:18

## 2024-05-30 RX ADMIN — BUSPIRONE HYDROCHLORIDE 10 MG: 10 TABLET ORAL at 20:24

## 2024-05-30 NOTE — UTILIZATION REVIEW
Initial Clinical Review    Admission: Date/Time/Statement:   Admission Orders (From admission, onward)       Ordered        05/29/24 1232  INPATIENT ADMISSION  Once                          Orders Placed This Encounter   Procedures    INPATIENT ADMISSION     Standing Status:   Standing     Number of Occurrences:   1     Order Specific Question:   Level of Care     Answer:   Level 2 Stepdown / HOT [14]     Order Specific Question:   Bed Type     Answer:   EMU [8]     Order Specific Question:   Estimated length of stay     Answer:   More than 2 Midnights     Order Specific Question:   Certification     Answer:   I certify that inpatient services are medically necessary for this patient for a duration of greater than two midnights. See H&P and MD Progress Notes for additional information about the patient's course of treatment.     Initial Presentation: 48 y.o. male direct admit to  EMU  for differential diagnosis recurrent prolonged episodes of repetitive behaviors that are concerning for seizures.  Clinical semiology is suggestive of psychogenic etiology but continuous video EEG monitoring is necessary to rule out the possibility of frontal lobe seizures.  His events have some variable features but consistently involve repetitive cursing/explicatives, pounding of chest, leg, or table, repetitive rocking motions of his body, lasting hours with impaired awareness but he seems to have some recognition of his surrounding.  Because insufficient number of events/seizures have not been captured and since the diagnosis of the patient's events have not been clarified, further continuous video EEG monitoring / admission is required.       5/29    Admit INPT status,  MS  Level of care for  continuous video EEG monitoring w/single lead ECG.  EMU protocol for seizure safety, fall precautions, ictal cognitive testing, and acute seizure care with vital signs.    sleep deprive tonight.  Hyperventilation and photic stimulation today.    PRN -  lorazepam 2mg IV if there is a generalized convulsion or more than 2 complex partial seizures within 12 hours       Anticipated Length of Stay/Certification Statement: patient will be admitted for at least 2 midnights for the evaluation of seizures / seizure-like activity.     Date: 5/30       Day 2: Continuous video EEG 5/29-5/30/2024 reviewed 5/30   to 10AM:    Normal awake and asleep background  No epileptiform discharges.   No clinical event.  No c/o.  Neuro exam stable w/ no deficits, GCS 15             ED Triage Vitals   Temperature Pulse Respirations Blood Pressure SpO2   05/29/24 1220 05/29/24 1220 05/29/24 1220 05/29/24 1220 05/29/24 1220   98.8 °F (37.1 °C) 59 16 119/58 98 %      Temp Source Heart Rate Source Patient Position - Orthostatic VS BP Location FiO2 (%)   05/29/24 1224 05/29/24 1224 -- -- --   Oral Monitor         Pain Score       05/29/24 1839       No Pain          Wt Readings from Last 1 Encounters:   05/29/24 75.6 kg (166 lb 10.7 oz)     Additional Vital Signs:   05/30/24 06:49:15 98.7 °F (37.1 °C) 62 -- 142/69 93 97 %   05/29/24 22:56:13 99.3 °F (37.4 °C) 57 -- 137/66 90 98 %   05/29/24 18:39:32 98.1 °F (36.7 °C) 64 16 124/69 87 97 %     Pertinent Labs/Diagnostic Test Results:     Beta- Hydroxybutyrate   Date Value Ref Range Status   04/18/2024 0.12 0.02 - 0.27 mmol/L Final          Past Medical History:   Diagnosis Date    Chronic back pain      Present on Admission:   Somatoform disorder, unspecified   Anxiety disorder   Spells of abnormal behavior   Seizure-like activity (HCC)      Admitting Diagnosis: Seizure-like activity (HCC) [R56.9]  Age/Sex: 48 y.o. male  Admission Orders:    see above.   Scd b/l LE.  May shower.   Reg diet .  Neuro ck q4H.       Scheduled Medications:  busPIRone, 10 mg, Oral, TID  sertraline, 50 mg, Oral, Daily      Continuous IV Infusions:     PRN Meds:  LORazepam, 2 mg, Intravenous, Q8H PRN  ondansetron, 4 mg, Intravenous, Q6H PRN  traZODone, 50 mg,  Oral, HS PRN        Network Utilization Review Department  ATTENTION: Please call with any questions or concerns to [phone] and carefully listen to the prompts so that you are directed to the right person. All voicemails are confidential.   For Discharge needs, contact Care Management DC Support Team at [phone] opt. 2  Send all requests for admission clinical reviews, approved or denied determinations and any other requests to dedicated fax number below belonging to the campus where the patient is receiving treatment. List of dedicated fax numbers for the Facilities:  FACILITY NAME UR FAX NUMBER   ADMISSION DENIALS (Administrative/Medical Necessity) [phone]   DISCHARGE SUPPORT TEAM (NETWORK) [phone]   PARENT CHILD HEALTH (Maternity/NICU/Pediatrics) [phone]   [facility] [phone]   [facility] [phone]   [facility] [phone]   [facility] [phone]   [facility] [phone]   [facility] [phone]   [facility] [phone]   [facility] [phone]   [facility] [phone]   [facility] [phone]   [facility] [phone]   [facility] [phone]

## 2024-05-30 NOTE — TELEPHONE ENCOUNTER
Sandie, A Representative from [facility] called in to notify Dr. Teresa Medina that the patient enrolled in a RN CASE Manager /Health  program.      Name of Health  is Gualberto Orozco   Phone # [phone].     Just a FYI incase Dr. Medina needs to reach out to Gualberto BLAKE For any  medical reasons or concerns.     Thank you.

## 2024-05-30 NOTE — PLAN OF CARE
Problem: PAIN - ADULT  Goal: Verbalizes/displays adequate comfort level or baseline comfort level  Description: Interventions:  - Encourage patient to monitor pain and request assistance  - Assess pain using appropriate pain scale  - Administer analgesics based on type and severity of pain and evaluate response  - Implement non-pharmacological measures as appropriate and evaluate response  - Consider cultural and social influences on pain and pain management  - Notify physician/advanced practitioner if interventions unsuccessful or patient reports new pain  Outcome: Progressing     Problem: INFECTION - ADULT  Goal: Absence or prevention of progression during hospitalization  Description: INTERVENTIONS:  - Assess and monitor for signs and symptoms of infection  - Monitor lab/diagnostic results  - Monitor all insertion sites, i.e. indwelling lines, tubes, and drains  - Monitor endotracheal if appropriate and nasal secretions for changes in amount and color  - [place] appropriate cooling/warming therapies per order  - Administer medications as ordered  - Instruct and encourage patient and family to use good hand hygiene technique  - Identify and instruct in appropriate isolation precautions for identified infection/condition  Outcome: Progressing  Goal: Absence of fever/infection during neutropenic period  Description: INTERVENTIONS:  - Monitor WBC    Outcome: Progressing     Problem: SAFETY ADULT  Goal: Patient will remain free of falls  Description: INTERVENTIONS:  - Educate patient/family on patient safety including physical limitations  - Instruct patient to call for assistance with activity   - Consult OT/PT to assist with strengthening/mobility   - Keep Call bell within reach  - Keep bed low and locked with side rails adjusted as appropriate  - Keep care items and personal belongings within reach  - Initiate and maintain comfort rounds  - Make Fall Risk Sign visible to staff  - Offer Toileting every 2 Hours,  in advance of need  - Initiate/Maintain bed alarm  - Apply yellow socks and bracelet for high fall risk patients  - Consider moving patient to room near nurses station  Outcome: Progressing  Goal: Maintain or return to baseline ADL function  Description: INTERVENTIONS:  -  Assess patient's ability to carry out ADLs; assess patient's baseline for ADL function and identify physical deficits which impact ability to perform ADLs (bathing, care of mouth/teeth, toileting, grooming, dressing, etc.)  - Assess/evaluate cause of self-care deficits   - Assess range of motion  - Assess patient's mobility; develop plan if impaired  - Assess patient's need for assistive devices and provide as appropriate  - Encourage maximum independence but intervene and supervise when necessary  - Involve family in performance of ADLs  - Assess for home care needs following discharge   - Consider OT consult to assist with ADL evaluation and planning for discharge  - Provide patient education as appropriate  Outcome: Progressing  Goal: Maintains/Returns to pre admission functional level  Description: INTERVENTIONS:  - Perform AM-PAC 6 Click Basic Mobility/ Daily Activity assessment daily.  - Set and communicate daily mobility goal to care team and patient/family/caregiver.   - Collaborate with rehabilitation services on mobility goals if consulted  - Perform Range of Motion 3 times a day.  - Reposition patient every 2 hours.  - Dangle patient 3 times a day  - Stand patient 2 times a day  - Ambulate patient 3 times a day  - Out of bed to chair 2 times a day   - Out of bed for meals 3 times a day  - Out of bed for toileting  - Record patient progress and toleration of activity level   Outcome: Progressing     Problem: DISCHARGE PLANNING  Goal: Discharge to home or other facility with appropriate resources  Description: INTERVENTIONS:  - Identify barriers to discharge w/patient and caregiver  - Arrange for needed discharge resources and  transportation as appropriate  - Identify discharge learning needs (meds, wound care, etc.)  - Arrange for interpretive services to assist at discharge as needed  - Refer to Case Management Department for coordinating discharge planning if the patient needs post-hospital services based on physician/advanced practitioner order or complex needs related to functional status, cognitive ability, or social support system  Outcome: Progressing     Problem: Knowledge Deficit  Goal: Patient/family/caregiver demonstrates understanding of disease process, treatment plan, medications, and discharge instructions  Description: Complete learning assessment and assess knowledge base.  Interventions:  - Provide teaching at level of understanding  - Provide teaching via preferred learning methods  Outcome: Progressing

## 2024-05-30 NOTE — PROGRESS NOTES
Neurology/Epilepsy Progress Note  Patient Name:  Ibrahima Adams  : 1975  MRN: [id number]  Encounter: [id number]  Date of admission: 2024  Date: 24  Bed / Unit: [facility] 718/[facility] 718-01    CC:  Admission to EMU for differential diagnosis    Assessment:  Mr. Ibrahima Adams is a 48 y.o. man admitted to the EMU for differential diagnosis recurrent prolonged episodes of repetitive behaviors that are concerning for seizures.  Clinical semiology is suggestive of psychogenic etiology but continuous video EEG monitoring is necessary to rule out the possibility of frontal lobe seizures.  His events have some variable features but consistently involve repetitive cursing/explicatives, pounding of chest, leg, or table, repetitive rocking motions of his body, lasting hours with impaired awareness but he seems to have some recognition of his surrounding.      Because insufficient number of events/seizures have not been captured and since the diagnosis of the patient's events have not been clarified, further continuous video EEG monitoring / admission is required.    PLAN:  Epilepsy/Spell evaluation:  continue continuous video EEG monitoring with single lead ECG  EMU protocol for seizure safety, fall precautions, ictal cognitive testing, and acute seizure care with vital signs  sleep deprive tonight  Hyperventilation today  Photic stimulation today  DVT prophylaxis: ambulation, venodynes  Seizure rescue: lorazepam 2mg IV if there is a generalized convulsion or more than 2 complex partial seizures within 12 hours    [x] Medical issues:  Anxiety disorder  - buspirone 10mg three times a day  - sertraline 50mg daily     FULL Code  Regular diet.     Interval History:   He reports no clinical event overnight.  He has no concerns at this time.      Current AEDs:  none    Additional Past Medical/Surgical History:  No additional medical history is provided by the patient and is unchanged from the initial H+P    PFSHx:  [x]  Family and Social history is unchanged from HPI Note    Current Medications:  Current Facility-Administered Medications   Medication Dose Route Frequency Provider Last Rate    busPIRone  10 mg Oral TID Roger Medina MD      LORazepam  2 mg Intravenous Q8H PRN Roger Medina MD      ondansetron  4 mg Intravenous Q6H PRN Roger Medina MD      sertraline  50 mg Oral Daily Roger Medina MD      traZODone  50 mg Oral HS PRN Roger Medina MD         PRN medications:     LORazepam    ondansetron    traZODone    Allergies:  No Known Allergies    Review of Systems  ROS:  (Extended=2-9; brief = 1)    Constitutional: negative  Respiratory: negative  Cardiovascular: negative  Gastrointestinal: negative  Musculoskeletal:negative  Neurological: negative  Behavioral/Psych: negative    Physical Exam:  EXAMINATION   (any 12=Level 3; any 6=Level 2; <6 = Level 1)  Elaborate all abnormal findings  General exam   /69   Pulse 62   Temp 98.7 °F (37.1 °C)   Resp 16   Ht 6' (1.829 m)   Wt 75.6 kg (166 lb 10.7 oz)   SpO2 97%   BMI 22.60 kg/m²    Appearance: normally developed, atraumatic  Carotids: not assessed  Cardiovascular: regular rate and rhythm and normal heart sounds  Pulmonary: clear to auscultation  Abdominal: soft, nondistended  Extremities: no edema  HEENT: anicteric and moist mucus membranes / oral cavity     Mental status  Orientation: alert and oriented to name, place, time  Fund of Knowledge: intact   Attention and Concentration: intact  Current and Remote Memory:intact  Language: spontaneous speech is normal and comprehension is intact    Cranial Nerves  CN 1: not tested  CN 2: not assessed   CN 3, 4, 6: EOMI, no nystagmus  CN 5:not assessed  CN 7:muscles of facial expression are symmetric  CN 8:not assessed  CN 9, 10:no dysarthria present  CN 11:symmetric shoulder shrug  CN 12:tongue is midline    Motor:  Bulk, Tone: normal bulk, normal tone  Pronation: not assessed  Strength: Symmetric strength of the  "arms and legs, no lateralizing weakness   Abnormal movements: no abnormal movements are present    Sensory:  Lighttouch: intact in all limbs  Romberg:not assessed    Coordination:  FNF:FNF bilaterally intact  ИРИНА:not assessed  FFM:not assessed  Gait/Station:normal gait    Reflexes:  not assessed    [] I personally reviewed the labs/radiological images/ EEG tracing  Labs:  None in the last 24 hours    Continuous video EEG 5/29-5/30/2024 reviewed to 10AM:  Normal awake and asleep background  No epileptiform discharges  No clinical event     [x]  The patient will be admitted for at least 2 midnights for the evaluation of seizures / seizure-like activity.    [x]  The patient is having medically uncontrolled seizures or clinical events, which poses a significant mortality and morbidity risk. Continuous monitoring will aid in diagnosis and determining best treatment options.  If seizures are left untreated, there is a 10-20% annual morbidity and 2% annual mortality rate.  Continuous monitoring is required to capture a sufficient number of clinical events or seizures, while medication are reduced or provoked, which increases the risk of severe seizures and at risk of seizures causing injury or status epilepticus.  The EMU is set up to monitor and record EEGs during seizures and it includes nurses trained to provide first aid and patient safety during seizures. The full hospital team must be present to deal with these medical emergency situations should they arise, including nurses to document patient’s exam. There have been 13 reported deaths during various video-EEG monitoring procedures, so this is not a \"low risk\" service.    The total amount of time spent with the patient / floor unit time was 25 minutes. More than 50% of this time was devoted to counseling and coordination of care. Issues addressed during this clinic visit included overall management, medication counseling or monitoring (including adverse effects, " side effects and risks of antiepileptic medications).   Start time: 10AM  End time: 10:25AM

## 2024-05-31 ENCOUNTER — APPOINTMENT (INPATIENT)
Dept: NEUROLOGY | Facility: CLINIC | Age: 49
DRG: 880 | End: 2024-05-31
Payer: COMMERCIAL

## 2024-05-31 LAB
ATRIAL RATE: 62 BPM
P AXIS: 66 DEGREES
PR INTERVAL: 144 MS
QRS AXIS: 51 DEGREES
QRSD INTERVAL: 94 MS
QT INTERVAL: 438 MS
QTC INTERVAL: 444 MS
T WAVE AXIS: 31 DEGREES
VENTRICULAR RATE: 62 BPM

## 2024-05-31 PROCEDURE — 95720 EEG PHY/QHP EA INCR W/VEEG: CPT | Performed by: PSYCHIATRY & NEUROLOGY

## 2024-05-31 PROCEDURE — 95715 VEEG EA 12-26HR INTMT MNTR: CPT

## 2024-05-31 PROCEDURE — 99233 SBSQ HOSP IP/OBS HIGH 50: CPT | Performed by: PSYCHIATRY & NEUROLOGY

## 2024-05-31 PROCEDURE — 93010 ELECTROCARDIOGRAM REPORT: CPT | Performed by: INTERNAL MEDICINE

## 2024-05-31 RX ADMIN — BUSPIRONE HYDROCHLORIDE 10 MG: 10 TABLET ORAL at 17:54

## 2024-05-31 RX ADMIN — BUSPIRONE HYDROCHLORIDE 10 MG: 10 TABLET ORAL at 21:05

## 2024-05-31 RX ADMIN — BUSPIRONE HYDROCHLORIDE 10 MG: 10 TABLET ORAL at 08:45

## 2024-05-31 RX ADMIN — SERTRALINE HYDROCHLORIDE 50 MG: 50 TABLET ORAL at 08:45

## 2024-05-31 RX ADMIN — TRAZODONE HYDROCHLORIDE 50 MG: 50 TABLET ORAL at 21:05

## 2024-05-31 NOTE — PROGRESS NOTES
"Neurology/Epilepsy Progress Note  Patient Name:  Ibrahima Adams  : 1975  MRN: [id number]  Encounter: [id number]  Date of admission: 2024  Date: 24  Bed / Unit: [facility] 718/[facility] 718-01    CC:  \"I had a minor event last night\" - EMU for differential diagnosis of seizure.    Assessment:  Mr. Ibrahima Adams is a 48 y.o. man admitted to the EMU for differential diagnosis recurrent prolonged episodes of repetitive behaviors that are concerning for seizures.  Clinical semiology is suggestive of psychogenic etiology but continuous video EEG monitoring is necessary to rule out the possibility of frontal lobe seizures.  His events have some variable features but consistently involve repetitive cursing/explicatives, pounding of chest, leg, or table, repetitive rocking motions of his body, lasting hours with impaired awareness but he seems to have some recognition of his surrounding.      Last night, he had a mild event of repeatedly cursing and slapping his chest and thigh with less force than in prior videos.  The EEG background is normal. This suggest that it is nonepileptic in etiology.  However, it is not as dramatic as his videos, when he has more body rocking movements and there was no ictal testing.  I will require further inpatient admission and video EEG monitoring to capture more dramatic events with ictal testing.    PLAN:  Epilepsy/Spell evaluation:  continue continuous video EEG monitoring with single lead ECG  EMU protocol for seizure safety, fall precautions, ictal cognitive testing, and acute seizure care with vital signs  Hyperventilation today  Photic stimulation today  DVT prophylaxis: ambulation, venodynes  Seizure rescue: lorazepam 2mg IV if there is a generalized convulsion or more than 2 complex partial seizures within 12 hours    [x] Medical issues:  Anxiety disorder  - buspirone 10mg three times a day  - sertraline 50mg daily     FULL Code  Regular diet.     Interval History:   He reports " that he had a minor event last night around 8:30PM.  He had forgotten to press the event button so no ictal testing was done.  He was aware of himself slapping his chest and cursing.    He was able to be sleep deprived until the 5AM.    I spoke to his wife to let her know about the mild event.  She had been trying to stress him out by sending text messages about things happening at home that may stress him out.  He had received another bill yesterday.  I reviewed with him and his wife there was a call from [facility] about the patient enrolling in a Health /RN case .  He does not recall signing up for any program through the insurance.  I informed his wife that the Health  is Gualberto [facility]umont can be reached at [phone].    Current AEDs:  none    Additional Past Medical/Surgical History:  No additional medical history is provided by the patient and is unchanged from the initial H+P    PFSHx:  [x] Family and Social history is unchanged from HPI Note    Current Medications:  Current Facility-Administered Medications   Medication Dose Route Frequency Provider Last Rate    busPIRone  10 mg Oral TID Roger Medina MD      LORazepam  2 mg Intravenous Q8H PRN Roger Medina MD      ondansetron  4 mg Intravenous Q6H PRN Roger Medina MD      sertraline  50 mg Oral Daily Roger Medina MD      traZODone  50 mg Oral HS PRN Roger Medina MD         PRN medications:     LORazepam    ondansetron    traZODone    Allergies:  No Known Allergies    Review of Systems  Constitutional: negative  Respiratory: negative  Cardiovascular: negative  Gastrointestinal: negative  Musculoskeletal:negative  Neurological: negative  Behavioral/Psych: negative    Physical Exam:  General exam   /87   Pulse 55   Temp 98.7 °F (37.1 °C)   Resp 16   Ht 6' (1.829 m)   Wt 75.6 kg (166 lb 10.7 oz)   SpO2 98%   BMI 22.60 kg/m²    Exam is unchanged from yesterday  Appearance: normocephalic, no acute distress  Carotids:  "not assessed  Cardiovascular: regular rate and rhythm and no murmur is present  Pulmonary: clear to auscultation  Abdominal: soft, nondistended  Extremities: no edema  HEENT: anicteric and moist mucus membranes / oral cavity     Mental status  Orientation: alert and oriented to name, place, time  Fund of Knowledge: intact   Attention and Concentration: intact  Current and Remote Memory:intact  Language: spontaneous speech is normal and comprehension is intact    Cranial Nerves  CN 1: not tested  CN 2: not assessed   CN 3, 4, 6: EOMI, no nystagmus  CN 5:not assessed  CN 7:muscles of facial expression are symmetric  CN 8:not assessed  CN 9, 10:no dysarthria present  CN 11:not assessed  CN 12:tongue is midline    Motor:  Bulk, Tone: normal bulk, normal tone  Pronation: not assessed  Strength: Symmetric strength of the arms and legs, no lateralizing weakness   Abnormal movements: no abnormal movements are present    Sensory:  Lighttouch: intact in all limbs  Romberg:not assessed    Coordination:  FNF:FNF bilaterally intact  ИРИНА:intact  FFM:intact  Gait/Station: not assessed    [] I personally reviewed the labs/radiological images/ EEG tracing  Labs:  None in the last 24 hours    Continuous video EEG 5/30-5/31/2024 reviewed to 9AM:  No epileptiform discharges  Event around 20:25-20:30 \"mild\"  He is lying in bed watching television, there are repetitive non-continuous low volume \"fuck\" cursing and slapping his chest with his right hand, which later moved to slapping his right thigh.    He did not press the event button to alert the nurses for ictal testing (no ictal testing done).  There is no change to the background awake activity.       The total amount of time spent with the patient / floor unit time was 39 minutes. More than 50% of this time was devoted to counseling and coordination of care. Issues addressed during this clinic visit included overall management, medication counseling or monitoring (including adverse " effects, side effects and risks of antiepileptic medications).   Start time: 9:15AM  End time: 9:54AM

## 2024-05-31 NOTE — PLAN OF CARE
Problem: PAIN - ADULT  Goal: Verbalizes/displays adequate comfort level or baseline comfort level  Description: Interventions:  - Encourage patient to monitor pain and request assistance  - Assess pain using appropriate pain scale  - Administer analgesics based on type and severity of pain and evaluate response  - Implement non-pharmacological measures as appropriate and evaluate response  - Consider cultural and social influences on pain and pain management  - Notify physician/advanced practitioner if interventions unsuccessful or patient reports new pain  Outcome: Progressing     Problem: INFECTION - ADULT  Goal: Absence or prevention of progression during hospitalization  Description: INTERVENTIONS:  - Assess and monitor for signs and symptoms of infection  - Monitor lab/diagnostic results  - Monitor all insertion sites, i.e. indwelling lines, tubes, and drains  - Monitor endotracheal if appropriate and nasal secretions for changes in amount and color  - [place] appropriate cooling/warming therapies per order  - Administer medications as ordered  - Instruct and encourage patient and family to use good hand hygiene technique  - Identify and instruct in appropriate isolation precautions for identified infection/condition  Outcome: Progressing  Goal: Absence of fever/infection during neutropenic period  Description: INTERVENTIONS:  - Monitor WBC    Outcome: Progressing     Problem: SAFETY ADULT  Goal: Patient will remain free of falls  Description: INTERVENTIONS:  - Educate patient/family on patient safety including physical limitations  - Instruct patient to call for assistance with activity   - Consult OT/PT to assist with strengthening/mobility   - Keep Call bell within reach  - Keep bed low and locked with side rails adjusted as appropriate  - Keep care items and personal belongings within reach  - Initiate and maintain comfort rounds  - Make Fall Risk Sign visible to staff  - Offer Toileting every 2 Hours,  in advance of need  - Initiate/Maintain bed alarm  - Apply yellow socks and bracelet for high fall risk patients  - Consider moving patient to room near nurses station  Outcome: Progressing  Goal: Maintain or return to baseline ADL function  Description: INTERVENTIONS:  -  Assess patient's ability to carry out ADLs; assess patient's baseline for ADL function and identify physical deficits which impact ability to perform ADLs (bathing, care of mouth/teeth, toileting, grooming, dressing, etc.)  - Assess/evaluate cause of self-care deficits   - Assess range of motion  - Assess patient's mobility; develop plan if impaired  - Assess patient's need for assistive devices and provide as appropriate  - Encourage maximum independence but intervene and supervise when necessary  - Involve family in performance of ADLs  - Assess for home care needs following discharge   - Consider OT consult to assist with ADL evaluation and planning for discharge  - Provide patient education as appropriate  Outcome: Progressing  Goal: Maintains/Returns to pre admission functional level  Description: INTERVENTIONS:  - Perform AM-PAC 6 Click Basic Mobility/ Daily Activity assessment daily.  - Set and communicate daily mobility goal to care team and patient/family/caregiver.   - Collaborate with rehabilitation services on mobility goals if consulted  - Perform Range of Motion 2 times a day.  - Reposition patient every 3 hours.  - Dangle patient 2 times a day  - Stand patient 3 times a day  - Ambulate patient 2 times a day  - Out of bed to chair 3 times a day   - Out of bed for meals 2 times a day  - Out of bed for toileting  - Record patient progress and toleration of activity level   Outcome: Progressing     Problem: DISCHARGE PLANNING  Goal: Discharge to home or other facility with appropriate resources  Description: INTERVENTIONS:  - Identify barriers to discharge w/patient and caregiver  - Arrange for needed discharge resources and  transportation as appropriate  - Identify discharge learning needs (meds, wound care, etc.)  - Arrange for interpretive services to assist at discharge as needed  - Refer to Case Management Department for coordinating discharge planning if the patient needs post-hospital services based on physician/advanced practitioner order or complex needs related to functional status, cognitive ability, or social support system  Outcome: Progressing     Problem: Knowledge Deficit  Goal: Patient/family/caregiver demonstrates understanding of disease process, treatment plan, medications, and discharge instructions  Description: Complete learning assessment and assess knowledge base.  Interventions:  - Provide teaching at level of understanding  - Provide teaching via preferred learning methods  Outcome: Progressing

## 2024-06-01 VITALS
TEMPERATURE: 98.9 F | WEIGHT: 166.67 LBS | DIASTOLIC BLOOD PRESSURE: 85 MMHG | BODY MASS INDEX: 22.57 KG/M2 | HEIGHT: 72 IN | HEART RATE: 58 BPM | SYSTOLIC BLOOD PRESSURE: 157 MMHG | RESPIRATION RATE: 18 BRPM | OXYGEN SATURATION: 98 %

## 2024-06-01 PROBLEM — F44.5 PSYCHOGENIC NONEPILEPTIC SEIZURE: Status: ACTIVE | Noted: 2024-05-29

## 2024-06-01 PROBLEM — F44.5 FUNCTIONAL NEUROLOGICAL SYMPTOM DISORDER WITH ATTACKS OR SEIZURES: Status: ACTIVE | Noted: 2024-01-29

## 2024-06-01 PROBLEM — I10 HYPERTENSION: Status: ACTIVE | Noted: 2024-06-01

## 2024-06-01 PROCEDURE — 99239 HOSP IP/OBS DSCHRG MGMT >30: CPT | Performed by: PSYCHIATRY & NEUROLOGY

## 2024-06-01 PROCEDURE — 95720 EEG PHY/QHP EA INCR W/VEEG: CPT | Performed by: PSYCHIATRY & NEUROLOGY

## 2024-06-01 RX ORDER — CLONAZEPAM 0.5 MG/1
TABLET ORAL
Qty: 20 TABLET | Refills: 0 | Status: SHIPPED | OUTPATIENT
Start: 2024-06-01

## 2024-06-01 RX ORDER — ACETAMINOPHEN 325 MG/1
650 TABLET ORAL EVERY 6 HOURS PRN
Status: DISCONTINUED | OUTPATIENT
Start: 2024-06-01 | End: 2024-06-01 | Stop reason: HOSPADM

## 2024-06-01 RX ADMIN — ACETAMINOPHEN 650 MG: 325 TABLET, FILM COATED ORAL at 01:48

## 2024-06-01 RX ADMIN — SERTRALINE HYDROCHLORIDE 50 MG: 50 TABLET ORAL at 08:47

## 2024-06-01 RX ADMIN — BUSPIRONE HYDROCHLORIDE 10 MG: 10 TABLET ORAL at 08:47

## 2024-06-01 NOTE — DISCHARGE INSTR - AVS FIRST PAGE
Mr. Ibrahima Adams was admitted to [facility] from 5/29-6/1/2024 to the epilepsy monitoring unit (EMU) for evaluation of clinical events that were concerning for seizures.  These events consists of repetitive behavior that includes repeatedly cursing, slapping his chest, thigh, or a countertop, sometimes there is rubbing of his face, sometimes there is associated rocking of his body, or repeatedly clenching his hands.  These events start slowly and build up over the course of hours to more severe vocalizations and body rocking activity.  He may have periods of impaired awareness during these events.  He reports that he sometimes try to suppress them in public, but if he is at home it can last for hours or days.    He had nearly 72 hours of continuous video EEG monitoring.  During this admission, he had one mild episode of repeatedly cursing, intermittently slapping his chest or right thigh, episode lasted about 5 minutes.  This event had no changes to the awake EEG.  The interictal EEG study is normal.  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  Based on the videos provided by his wife, the variability of features, slow build up of symptoms and his reported ability to suppress them in public indicate that these are not epileptic seizures.  These events are part of an underlying functional neurological symptoms disorder.    There is no diagnosis of epilepsy.    Dr. Medina had discussed with the patient and his wife that the events most consistent with nonepileptic psychogenic attacks.  These are otherwise known as pseudoseizures, nonepileptic attacks, or stress spells.  Dr. Medina reviewed that epileptic seizures are due to abnormal synchronous neuronal activity of the brain causing symptoms of altered awareness, loss of consciousness focal motor activity and/or convulsive actions.  Unlike epileptic seizures, nonepileptic psychogenic attacks are not associated with synchronous neuronal activity  of the brain.  The exact mechanism of nonepileptic psychogenic attacks are unknown; however, typically these events are strongly correlated with underlying psychiatric conditions such as depression, anxiety, posttraumatic stress disorder, family conflict, or emotional distress. These attacks are consider be a form of defense mechanism under somatoform disorders, conversion disorder, or dissociative disorder.  It is recommended that the patient is evaluated by a behavioral health specialist including psychiatrist, psychologist, and license counselors.  Psychotherapy as the form of treatment including cognitive behavioral therapy, relaxation training, and biofeedback training.    The patient and his wife appears to understand that these events are not epileptic seizures and are willing to be evaluated by a behavioral health specialist.    Resources:  https://www.fndhope.org  https://nonepilepticseizures.com     Recommendations:  They recognize that as Mr. Adams has started to see a therapist and establish care with a psychiatrist (higher dose of sertraline and buspirone were prescribed), his events are shorter and less intense than before.  Due to the occasional prolonged duration of these symptoms, it has affected his wife to sleep and be able to maintain her job.  So for the occasional prolonged events, it may be reasonable to try a benzodiazepine to try to calm down the underlying anxiety symptoms associated with these events.  They had tried lorazepam in the past which had temporarily reduced the severity of symptoms.  Dr. Medina recommended clonazepam which may have a longer half-life to reduce his symptoms.  They should first try grounding techniques as instructed by his therapist first.  But should symptoms affect his wife's ability to sleep or go to work, they may use clonazepam as a temporizing measure.  - prescribed Clonazepam 0.5mg tab take one tab as needed once a day for nonepileptic event lasting more than  "an hour.  - continue to see your therapist about functional neurological symptoms disorder (conversion disorder).  - continue to see your psychiatrist about generalized anxiety disorder  - you may cancel your scheduled 72 hours ambulatory EEG study   - continue to video record new \"seizure-like events\" especially if there is convulsive or generalized shaking activity that is different from prior events.    Hypertension  - please see your primary care doctor about hypertension and if you need to be on a medication to control hypertension.  (Sometimes a beta-blocker may help reduce anxiety.)    Non-Epileptic Psychogenic Spells (NEPS)  What are non-epileptic psychogenic spells?   Non-epileptic psychogenic spells (NEPS), sometimes called non-epileptic attack, nervous spells, pseudo-seizures, or stress spells, are behavioral events that look like epileptic seizures to an observer, but are not actually epileptic seizures.  What is the difference between epileptic seizures and NEPS?   Epileptic seizures can be described as an electrical storm in the brain.  The electrical storm causes the symptoms of the seizure (shaking, unresponsiveness, etc) and shows up as abnormal changes in brain waves on EEG monitoring.    Non-epileptic psychogenic spells (NEPS) are not due to an electrical storm in the brain and brain waves on EEG monitoring remain normal during NEPS. They are still real, uncontrollable events that can cause a person to be unresponsive and sometimes shake, but they are not caused by abnormal electrical signals in the brain.   How is the diagnosis of NEPS made?   In most cases, the events must be captured in the act on EEG while patients are monitored on video-EEG, like in an Epilepsy Monitoring Unit.   This allows doctors to see both what the patient is doing during the event and monitor the brain waves to look for the presence or absence of abnormal brain waves (the electrical storm).   Why is it important to " diagnose NEPS?   The treatment for epileptic seizures and non-epileptic psychogenic spells are very different.  Seizure medications are designed to treat electrical storms of the brain, and don’t work in NEPS because an electrical storm is not the problem.   By confirming the diagnosis of NEPS, patients can avoid unnecessary medications or procedures and focus on treatments specific for NEPS with a mental health provider.   How common are NEPS?  The exact prevalence of NEPS in the population is not known, but a third to a half of patients seen in Epilepsy monitoring units are diagnosed with NEPS.  What causes NEPS?   Sometimes the exact cause of NEPS is difficult to determine.    Common causes are depression, anxiety, post-traumatic stress disorder, family conflict, or a history of abuse.   Talking with a mental health provider is the best way to try to identify the cause.   What if an underlying cause can’t be found?   Counseling from a certified counselor, psychologist or psychiatrist is helpful even if a specific cause of NEPS cannot be found.  Mental health professionals can teach patients techniques for dealing with the spells.   What are the treatment options for NEPS?   Treatment begins with evaluation by a mental health specialist, including psychologists, licensed counselors, and psychiatrists. They will work to identify a cause if possible, and then focus treatments for that cause.   Remember, often, no clear cause can be found. Working with a mental health provider is still important.  Treatments include counseling, medications, and strategies to cope/deal with the spells.   Some strategies to cope/deal with the spells include: relaxation training, visualization, biofeedback, and active problem solving skills.  Are they faked or done purposely?   No. NEPS are not “faked”.   What happens during a non-epileptic psychogenic spell is out of the control of the patient.   Working with a mental health provider  can help get these events back under a person’s control.  Can the brain be damaged from NEPS?   No. The brain has normal electrical activity during a NEPS.   People can be injured if they fall during an event, so diagnosis and correct treatment are very important.   Can NEPS affect a person’s activities of daily living?   Even though non-epileptic psychogenic spells are not epileptic seizures, the spells can affect people’s daily lives.    When a spell occurs, patients should stop what you are doing until the spell passes.   After the spell stops, he/she can then resume their regular activities as soon as they are able.   Can people with epilepsy also have NEPS?   Yes. It is possible for people with epilepsy to also have NEPS.  EEG monitoring can determine which spells are epileptic seizures and which are NEPS.   Where can I find a psychologist?   Often, your primary care doctor or the doctors in the Epilepsy monitoring unit can help direct you to a mental health provider.   The American Psychological Association (APA) has a “Psychologist ” (http://.apa.org/).  Enter your zip code and “dissociative disorders” in the area of specialization field to find the names and contact information of some mental health professionals in your area.    What should people do when I have a nonepileptic attack?   It is often difficult to distinguish between an epileptic seizure and nonepileptic attack, unless your family has been instructed on recognizing seizures and nonepileptic attacks.  It is recommended that you have your family member, friends, or colleagues available at your doctor's appointment to review what are seizures and what are your nonepileptic attacks (this may include a review of a video of your nonepileptic attack).  First thing is to stay calm and not panic.  Make sure that the person having the seizure or attack is safe and remove dangerous objects.  You may even place a pillow of soft clothing  under their head.  Speak calmly to the person having a seizure or attack.  Non-epileptic attacks may stop if the person is reassured that they are safe and the attack will end.  Remember that non-epileptic attacks do not cause damage to the brain and may go on for several minutes.  Sometimes nonepileptic attacks last longer than epileptic seizures.  If you are not sure if the person is having a nonepileptic attack or epileptic seizure for more than 5 minutes, you may call for an ambulance but inform the emergency medical technician that of the person's medical diagnosis of epilepsy and / or nonepileptic attacks.  Do not hold the person down during the seizure or nonepileptic attack.  Do not give the person medication or food or place any object into their mouth during the seizure or nonepileptic attack.  For more information about NEPS, you can visit the following website:  http://www.nonepilepticseizures.com  https://www.fndhope.org  http://www.nonepilepticattacks.info  Book with information and tools for management of NEPS:   Betty Rodriguez. (2014) Psychogenic Non-epileptic Seizures: A Guide. [employer]

## 2024-06-01 NOTE — DISCHARGE SUMMARY
EPILEPSY ATTENDING DISCHARGE SUMMARY    Patient Name: Ibrahima Adams  YOB: 1975  MRN: [id number]  [facility] Number: [id number]  Date of Admission:   5/29/2024  Date of Discharge: 06/01/24  Attending on Admission: Roger Medina MD PhD   Attending on Discharge: Roger Medina MD PhD     Admission Diagnosis:    1. Recurrent Seizures G40.909    Discharge Diagnosis:  1. Functional neurological symptoms disorder, with seizure F44.5    Secondary Diagnoses:  Generalized anxiety disorder  Insomnia  Hypertension    Procedures:  Continuous Video EEG    Medication Changes:  Added on clonazepam 0.5mg one tab once a day as needed for psychogenic nonepileptic event lasting more than one hour.    Discharge Medications:  Sertraline 50mg daily  Buspirone 10mg three times a day  Trazodone 50mg at bedtime as needed for insomnia    Recommended follow-up testing:  Recommend that he sees his primary care doctor about hypertension.  During his EMU admission his blood pressure were in the 140-170s/80-90s.    Brief History:  [facility] to the epilepsy monitoring unit (EMU) for evaluation of clinical events that were concerning for seizures.  These events consists of repetitive behavior that includes repeatedly cursing, slapping his chest, thigh, or a countertop, sometimes there is rubbing of his face, sometimes there is associated rocking of his body, or repeatedly clenching his hands.  These events start slowly and build up over the course of hours to more severe vocalizations and body rocking activity.  He may have periods of impaired awareness during these events.  He reports that he sometimes try to suppress them in public, but if he is at home it can last for hours or days.      Hospital Course:  Ibrahima Adams had 3 days of continuous video EEG monitoring, including activation procedures of sleep deprivation every other day, photic stimulation, and  "hyperventilation.  During this admission, he had one mild episode of repeatedly cursing, intermittently slapping his chest or right thigh, episode lasted about 5 minutes.  This event had no changes to the awake EEG.  The interictal EEG study is normal.  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  Based on the videos provided by his wife, the variability of features, slow build up of symptoms and his reported ability to suppress them in public indicate that these are not epileptic seizures.    During this admission, it was noted that his blood pressure was elevated to greater than 140s/90s.      EMU CONCLUSION  This concludes 71 hours of continuous video EEG monitoring.  Summary interictal findings:  Normal awake and asleep background activity  No epileptiform discharges   No focal slowing  Summary event findings:  One psychogenic nonepileptic event on 5/30/2024 at 8:26 PM of him intermittently, repeatedly cursing \"fuck\", slapping his chest and then slapping his thigh for about 5 minutes.  There is no change to the EEG activity.  The behavior is not epileptic in origin.  Seizure Classification: Psychogenic nonepileptic event  Epilepsy Classification: There is no diagnosis of epilepsy    EMU findings and discussion:  Dr. Medina had discussed with the patient and his wife that the events most consistent with nonepileptic psychogenic attacks.  These are otherwise known as pseudoseizures, nonepileptic attacks, or stress spells.  Dr. Medina reviewed that epileptic seizures are due to abnormal synchronous neuronal activity of the brain causing symptoms of altered awareness, loss of consciousness focal motor activity and/or convulsive actions.  Unlike epileptic seizures, nonepileptic psychogenic attacks are not associated with synchronous neuronal activity of the brain.  The exact mechanism of nonepileptic psychogenic attacks are unknown; however, typically these events are strongly correlated with underlying psychiatric " conditions such as depression, anxiety, posttraumatic stress disorder, family conflict, or emotional distress. These attacks are consider be a form of defense mechanism under somatoform disorders, conversion disorder, or dissociative disorder.  It is recommended that the patient is evaluated by a behavioral health specialist including psychiatrist, psychologist, and license counselors.  Psychotherapy as the form of treatment including cognitive behavioral therapy, relaxation training, and biofeedback training.    The patient and his wife appears to understand that these events are not epileptic seizures and are willing to be evaluated by a behavioral health specialist.       Recommendations:  They recognize that as Mr. Adams has started to see a therapist and establish care with a psychiatrist (higher dose of sertraline and buspirone were prescribed), his events are shorter and less intense than before.  Due to the occasional prolonged duration of these symptoms, it has affected his wife to sleep and be able to maintain her job.  So for the occasional prolonged events, it may be reasonable to try a benzodiazepine to try to calm down the underlying anxiety symptoms associated with these events.  They had tried lorazepam in the past which had temporarily reduced the severity of symptoms.  Dr. Medina recommended clonazepam which may have a longer half-life to reduce his symptoms.  They should first try grounding techniques as instructed by his therapist first.  But should symptoms affect his wife's ability to sleep or go to work, they may use clonazepam as a temporizing measure.  - prescribed Clonazepam 0.5mg tab take one tab as needed once a day for nonepileptic event lasting more than an hour.  - continue to see your therapist about functional neurological symptoms disorder (conversion disorder).  - continue to see your psychiatrist about generalized anxiety disorder  - you may cancel your scheduled 72 hours ambulatory  "EEG study   - continue to video record new \"seizure-like events\" especially if there is convulsive or generalized shaking activity that is different from prior events.    Condition at Discharge: good     Discharge instructions/Information to patient and family:   See after visit summary for information provided to patient and family.      Provisions for Follow-Up Care:  See after visit summary for information related to follow-up care and any pertinent home health orders.      Disposition: Home    Discharge Statement:  I spent 61 minutes discharging the patient (Start:  1:10PM; Stop:  2:11PM on 6/1/2024), greater than 50% of total time spent counseling the patient, coordination of care, providing post-discharge instructions and follow-up.  This time was spent on the day of discharge. I had direct contact with the patient on the day of discharge. Additional time then spent on discharge activities.    The [place] was queried.  No red flags were identified.  The patient is low risk for abuse of the prescribed clonazepam medication.  Safe to proceed with prescription.       "

## 2024-06-01 NOTE — PLAN OF CARE
Problem: PAIN - ADULT  Goal: Verbalizes/displays adequate comfort level or baseline comfort level  Description: Interventions:  - Encourage patient to monitor pain and request assistance  - Assess pain using appropriate pain scale  - Administer analgesics based on type and severity of pain and evaluate response  - Implement non-pharmacological measures as appropriate and evaluate response  - Consider cultural and social influences on pain and pain management  - Notify physician/advanced practitioner if interventions unsuccessful or patient reports new pain  Outcome: Progressing     Problem: INFECTION - ADULT  Goal: Absence or prevention of progression during hospitalization  Description: INTERVENTIONS:  - Assess and monitor for signs and symptoms of infection  - Monitor lab/diagnostic results  - Monitor all insertion sites, i.e. indwelling lines, tubes, and drains  - Monitor endotracheal if appropriate and nasal secretions for changes in amount and color  - River appropriate cooling/warming therapies per order  - Administer medications as ordered  - Instruct and encourage patient and family to use good hand hygiene technique  - Identify and instruct in appropriate isolation precautions for identified infection/condition  Outcome: Progressing  Goal: Absence of fever/infection during neutropenic period  Description: INTERVENTIONS:  - Monitor WBC    Outcome: Progressing     Problem: SAFETY ADULT  Goal: Patient will remain free of falls  Description: INTERVENTIONS:  - Educate patient/family on patient safety including physical limitations  - Instruct patient to call for assistance with activity   - Consult OT/PT to assist with strengthening/mobility   - Keep Call bell within reach  - Keep bed low and locked with side rails adjusted as appropriate  - Keep care items and personal belongings within reach  - Initiate and maintain comfort rounds  - Make Fall Risk Sign visible to staff  - Offer Toileting every 2 Hours,  in advance of need  - Initiate/Maintain bed and chair alarm  - Apply yellow socks and bracelet for high fall risk patients  - Consider moving patient to room near nurses station  Outcome: Progressing  Goal: Maintain or return to baseline ADL function  Description: INTERVENTIONS:  -  Assess patient's ability to carry out ADLs; assess patient's baseline for ADL function and identify physical deficits which impact ability to perform ADLs (bathing, care of mouth/teeth, toileting, grooming, dressing, etc.)  - Assess/evaluate cause of self-care deficits   - Assess range of motion  - Assess patient's mobility; develop plan if impaired  - Assess patient's need for assistive devices and provide as appropriate  - Encourage maximum independence but intervene and supervise when necessary  - Involve family in performance of ADLs  - Assess for home care needs following discharge   - Consider OT consult to assist with ADL evaluation and planning for discharge  - Provide patient education as appropriate  Outcome: Progressing  Goal: Maintains/Returns to pre admission functional level  Description: INTERVENTIONS:  - Perform AM-PAC 6 Click Basic Mobility/ Daily Activity assessment daily.  - Set and communicate daily mobility goal to care team and patient/family/caregiver.   - Collaborate with rehabilitation services on mobility goals if consulted  - Perform Range of Motion 3 times a day.  - Reposition patient every 2 hours.  - Dangle patient 3 times a day  - Stand patient 3 times a day  - Ambulate patient 3 times a day  - Out of bed to chair 3 times a day   - Out of bed for meals 3 times a day  - Out of bed for toileting  - Record patient progress and toleration of activity level   Outcome: Progressing     Problem: DISCHARGE PLANNING  Goal: Discharge to home or other facility with appropriate resources  Description: INTERVENTIONS:  - Identify barriers to discharge w/patient and caregiver  - Arrange for needed discharge resources and  transportation as appropriate  - Identify discharge learning needs (meds, wound care, etc.)  - Arrange for interpretive services to assist at discharge as needed  - Refer to Case Management Department for coordinating discharge planning if the patient needs post-hospital services based on physician/advanced practitioner order or complex needs related to functional status, cognitive ability, or social support system  Outcome: Progressing     Problem: NEUROSENSORY - ADULT  Goal: Achieves stable or improved neurological status  Description: INTERVENTIONS  - Monitor and report changes in neurological status  - Monitor vital signs such as temperature, blood pressure, glucose, and any other labs ordered   - Initiate measures to prevent increased intracranial pressure  - Monitor for seizure activity and implement precautions if appropriate      Outcome: Progressing  Goal: Achieves maximal functionality and self care  Description: INTERVENTIONS  - Monitor swallowing and airway patency with patient fatigue and changes in neurological status  - Encourage and assist patient to increase activity and self care.   - Encourage visually impaired, hearing impaired and aphasic patients to use assistive/communication devices  Outcome: Progressing

## 2024-06-01 NOTE — ASSESSMENT & PLAN NOTE
Please follow-up with your primary care doctor to evaluate your hypertension.  During this admission your blood pressure remained elevated at 140s-170s/80s-90s.

## 2024-06-01 NOTE — PLAN OF CARE
Problem: PAIN - ADULT  Goal: Verbalizes/displays adequate comfort level or baseline comfort level  Description: Interventions:  - Encourage patient to monitor pain and request assistance  - Assess pain using appropriate pain scale  - Administer analgesics based on type and severity of pain and evaluate response  - Implement non-pharmacological measures as appropriate and evaluate response  - Consider cultural and social influences on pain and pain management  - Notify physician/advanced practitioner if interventions unsuccessful or patient reports new pain  Outcome: Progressing     Problem: INFECTION - ADULT  Goal: Absence or prevention of progression during hospitalization  Description: INTERVENTIONS:  - Assess and monitor for signs and symptoms of infection  - Monitor lab/diagnostic results  - Monitor all insertion sites, i.e. indwelling lines, tubes, and drains  - Monitor endotracheal if appropriate and nasal secretions for changes in amount and color  - [place] appropriate cooling/warming therapies per order  - Administer medications as ordered  - Instruct and encourage patient and family to use good hand hygiene technique  - Identify and instruct in appropriate isolation precautions for identified infection/condition  Outcome: Progressing  Goal: Absence of fever/infection during neutropenic period  Description: INTERVENTIONS:  - Monitor WBC    Outcome: Progressing     Problem: SAFETY ADULT  Goal: Patient will remain free of falls  Description: INTERVENTIONS:  - Educate patient/family on patient safety including physical limitations  - Instruct patient to call for assistance with activity   - Consult OT/PT to assist with strengthening/mobility   - Keep Call bell within reach  - Keep bed low and locked with side rails adjusted as appropriate  - Keep care items and personal belongings within reach  - Initiate and maintain comfort rounds  - Make Fall Risk Sign visible to staff  - Offer Toileting every 2 Hours,  in advance of need  - Initiate/Maintain bed alarm  - Apply yellow socks and bracelet for high fall risk patients  - Consider moving patient to room near nurses station  Outcome: Progressing  Goal: Maintain or return to baseline ADL function  Description: INTERVENTIONS:  -  Assess patient's ability to carry out ADLs; assess patient's baseline for ADL function and identify physical deficits which impact ability to perform ADLs (bathing, care of mouth/teeth, toileting, grooming, dressing, etc.)  - Assess/evaluate cause of self-care deficits   - Assess range of motion  - Assess patient's mobility; develop plan if impaired  - Assess patient's need for assistive devices and provide as appropriate  - Encourage maximum independence but intervene and supervise when necessary  - Involve family in performance of ADLs  - Assess for home care needs following discharge   - Consider OT consult to assist with ADL evaluation and planning for discharge  - Provide patient education as appropriate  Outcome: Progressing  Goal: Maintains/Returns to pre admission functional level  Description: INTERVENTIONS:  - Perform AM-PAC 6 Click Basic Mobility/ Daily Activity assessment daily.  - Set and communicate daily mobility goal to care team and patient/family/caregiver.   - Collaborate with rehabilitation services on mobility goals if consulted  - Perform Range of Motion 3 times a day.  - Reposition patient every 2 hours.  - Dangle patient 3 times a day  - Stand patient 2 times a day  - Ambulate patient 3 times a day  - Out of bed to chair 2 times a day   - Out of bed for meals 3 times a day  - Out of bed for toileting  - Record patient progress and toleration of activity level   Outcome: Progressing     Problem: DISCHARGE PLANNING  Goal: Discharge to home or other facility with appropriate resources  Description: INTERVENTIONS:  - Identify barriers to discharge w/patient and caregiver  - Arrange for needed discharge resources and  transportation as appropriate  - Identify discharge learning needs (meds, wound care, etc.)  - Arrange for interpretive services to assist at discharge as needed  - Refer to Case Management Department for coordinating discharge planning if the patient needs post-hospital services based on physician/advanced practitioner order or complex needs related to functional status, cognitive ability, or social support system  Outcome: Progressing     Problem: Knowledge Deficit  Goal: Patient/family/caregiver demonstrates understanding of disease process, treatment plan, medications, and discharge instructions  Description: Complete learning assessment and assess knowledge base.  Interventions:  - Provide teaching at level of understanding  - Provide teaching via preferred learning methods  Outcome: Progressing     Problem: NEUROSENSORY - ADULT  Goal: Achieves stable or improved neurological status  Description: INTERVENTIONS  - Monitor and report changes in neurological status  - Monitor vital signs such as temperature, blood pressure, glucose, and any other labs ordered   - Initiate measures to prevent increased intracranial pressure  - Monitor for seizure activity and implement precautions if appropriate      Outcome: Progressing  Goal: Achieves maximal functionality and self care  Description: INTERVENTIONS  - Monitor swallowing and airway patency with patient fatigue and changes in neurological status  - Encourage and assist patient to increase activity and self care.   - Encourage visually impaired, hearing impaired and aphasic patients to use assistive/communication devices  Outcome: Progressing

## 2024-06-03 NOTE — UTILIZATION REVIEW
NOTIFICATION OF ADMISSION DISCHARGE   This is a Notification of Discharge from [facility]. Please be advised that this patient has been discharge from our facility. Below you will find the admission and discharge date and time including the patient’s disposition.   UTILIZATION REVIEW CONTACT:  Michael Chicas  Utilization   Network Utilization Review Department  Phone: [phone] x carefully listen to the prompts. All voicemails are confidential.  Email: [email or web address]     ADMISSION INFORMATION  PRESENTATION DATE: 5/29/2024 12:10 PM  OBERVATION ADMISSION DATE:   INPATIENT ADMISSION DATE: 5/29/24 12:10 PM   DISCHARGE DATE: 6/1/2024  3:12 PM   DISPOSITION:Home/Self Care    Network Utilization Review Department  ATTENTION: Please call with any questions or concerns to [phone] and carefully listen to the prompts so that you are directed to the right person. All voicemails are confidential.   For Discharge needs, contact Care Management DC Support Team at [phone] opt. 2  Send all requests for admission clinical reviews, approved or denied determinations and any other requests to dedicated fax number below belonging to the campus where the patient is receiving treatment. List of dedicated fax numbers for the Facilities:  FACILITY NAME UR FAX NUMBER   ADMISSION DENIALS (Administrative/Medical Necessity) [phone]   DISCHARGE SUPPORT TEAM ([facility]) [phone]   PARENT CHILD HEALTH (Maternity/NICU/Pediatrics) [phone]   [facility] [phone]   [facility] [phone]   [facility] [phone]   [facility] [phone]   [facility] [phone]   [facility] [phone]   [facility] [phone]   [facility] [phone]   [facility]  [facility] [phone]   [facility] [phone]   [facility] [phone]   [facility] [phone]

## 2024-06-05 DIAGNOSIS — F41.1 GENERALIZED ANXIETY DISORDER: ICD-10-CM

## 2024-06-05 RX ORDER — BUSPIRONE HYDROCHLORIDE 10 MG/1
10 TABLET ORAL 3 TIMES DAILY
Qty: 60 TABLET | Refills: 1 | Status: SHIPPED | OUTPATIENT
Start: 2024-06-05

## 2024-06-27 DIAGNOSIS — F41.1 GENERALIZED ANXIETY DISORDER: ICD-10-CM

## 2024-06-27 DIAGNOSIS — F32.A DEPRESSIVE DISORDER: ICD-10-CM

## 2024-06-30 DIAGNOSIS — F41.1 GENERALIZED ANXIETY DISORDER: ICD-10-CM

## 2024-06-30 DIAGNOSIS — F32.A DEPRESSIVE DISORDER: ICD-10-CM

## 2024-07-01 RX ORDER — TRAZODONE HYDROCHLORIDE 50 MG/1
TABLET ORAL
Qty: 30 TABLET | Refills: 1 | Status: SHIPPED | OUTPATIENT
Start: 2024-07-01

## 2024-07-10 ENCOUNTER — OFFICE VISIT (OUTPATIENT)
Dept: PSYCHIATRY | Facility: CLINIC | Age: 49
End: 2024-07-10
Payer: COMMERCIAL

## 2024-07-10 VITALS — DIASTOLIC BLOOD PRESSURE: 65 MMHG | SYSTOLIC BLOOD PRESSURE: 126 MMHG

## 2024-07-10 DIAGNOSIS — F41.1 GENERALIZED ANXIETY DISORDER: ICD-10-CM

## 2024-07-10 DIAGNOSIS — F44.5 PSYCHOGENIC NONEPILEPTIC SEIZURE: Primary | ICD-10-CM

## 2024-07-10 DIAGNOSIS — F32.A DEPRESSIVE DISORDER: ICD-10-CM

## 2024-07-10 PROCEDURE — 99213 OFFICE O/P EST LOW 20 MIN: CPT | Performed by: PSYCHIATRY & NEUROLOGY

## 2024-07-10 RX ORDER — TRAZODONE HYDROCHLORIDE 50 MG/1
100 TABLET ORAL
Start: 2024-07-10

## 2024-07-10 RX ORDER — BUSPIRONE HYDROCHLORIDE 15 MG/1
15 TABLET ORAL 3 TIMES DAILY
Qty: 90 TABLET | Refills: 1 | Status: SHIPPED | OUTPATIENT
Start: 2024-07-10

## 2024-07-10 RX ORDER — SERTRALINE HYDROCHLORIDE 100 MG/1
100 TABLET, FILM COATED ORAL DAILY
Qty: 30 TABLET | Refills: 1 | Status: SHIPPED | OUTPATIENT
Start: 2024-07-10

## 2024-07-10 NOTE — BH TREATMENT PLAN
TREATMENT PLAN (Medication Management Only)        [facility] - [facility]    Name and Date of Birth:  Ibrahima Adams 49 y.o. 1975  Date of Treatment Plan: July 10, 2024  Diagnosis/Diagnoses:    1. Psychogenic nonepileptic seizure    2. Generalized anxiety disorder    3. Depressive disorder      Strengths/Personal Resources for Self-Care: supportive family, taking medications as prescribed.  Area/Areas of need (in own words): anxiety symptoms  1. Long Term Goal: decrease anxiety.  Target Date:1 month - 8/10/2024  Person/Persons responsible for completion of goal: Ibrahima, wife  2.  Short Term Objective (s) - How will we reach this goal?:   A. Provider new recommended medication/dosage changes and/or continue medication(s): continue current medications as prescribed.  B. N/A.  C. N/A.  Target Date:1 month - 8/10/2024  Person/Persons Responsible for Completion of Goal: Ibrahima, wife  Progress Towards Goals: continuing treatment  Treatment Modality: medication management every 1 month  Review due 180 days from date of this plan: 6 months - 1/10/2025  Expected length of service: ongoing treatment  My Physician/PA/NP and I have developed this plan together and I agree to work on the goals and objectives. I understand the treatment goals that were developed for my treatment.

## 2024-07-10 NOTE — PSYCH
Ibrahima Adams 1975 [id number]     The patient was seen for continuing care and pharmacotherapy.  Neurological evaluation was completed and it was determined that the patient does not have epilepsy and these episodes were most likely psychogenic.  The patient has seen psychologist Dr. Chetan Mcadams for several times and has been diagnosed with conversion disorder.  He has had some, although mild improvement with his current regimen of medications which includes sertraline and buspirone and trazodone.  He has continued with these episodes of reduced awareness of the environment but by his own acknowledgment it seems to be better than before.  At times he cannot sleep well through the night.    ROS:  As above  Current Mental Status Evaluation:  Appearance:  Adequate hygiene and grooming   Behavior:  Cooperative - Restless and fidgety   Mood:  Anxious and Dysphoric   Affect: constricted and mood-congruent   Speech: Normal volume and Normal rate   Thought Process:  Goal directed and coherent   Thought Content:  Does not verbalize delusional material   Perceptual Disturbances: Denies hallucinations and does not appear to be responding to internal stimuli   Risk Potential: No suicidal or homicidal ideation   Orientation:         Diagnosis ICD-10-CM Associated Orders   1. Psychogenic nonepileptic seizure  F44.5       2. Generalized anxiety disorder  F41.1 sertraline (ZOLOFT) 100 mg tablet     busPIRone (BUSPAR) 15 mg tablet     traZODone (DESYREL) 50 mg tablet      3. Depressive disorder  F32.A sertraline (ZOLOFT) 100 mg tablet     traZODone (DESYREL) 50 mg tablet             Current Outpatient Medications   Medication Instructions    busPIRone (BUSPAR) 15 mg, Oral, 3 times daily    sertraline (ZOLOFT) 100 mg, Oral, Daily, He takes this medication in the morning    traZODone (DESYREL) 100 mg, Oral, Daily at bedtime          Treatment Recommendations- Risks Benefits    Increase sertraline to 100 mg, trazodone to  100 mg and buspirone to 15 mg 3 times daily risks, follow-up next month    Benefits And Possible Side Effects Of Medications:  Risks, benefits, and possible side effects of medications explained to patient and patient verbalizes understanding    VIRTUAL VISIT DISCLAIMER    Ibrahima Adams verbally agrees to participate in Virtual Care Services. Pt is aware that Virtual Care Services could be limited without vital signs or the ability to perform a full hands-on physical exam. Ibrahima Kearneyclifford understands he or the provider may request at any time to terminate the video visit and request the patient to seek care or treatment in person.     Jimmie Hung MD 07/10/24

## 2024-07-10 NOTE — BH TREATMENT PLAN
"TREATMENT PLAN (Medication Management Only)        [facility] - [facility]    Name and Date of Birth:  Ibrahima Adams 49 y.o. 1975  Date of Treatment Plan: July 10, 2024  Diagnosis/Diagnoses:  No diagnosis found.  Strengths/Personal Resources for Self-Care: {AMB PATIENT STRENGTHS:60638}.  Area/Areas of need (in own words): {AMB PATIENT AREAS OF NEED:11153}  1. Long Term Goal: {AMB LONG TERM GOALS:36423}.  Target Date:{AMB TREATMENT PLAN TARGET DATE:34824}  Person/Persons responsible for completion of goal: {AMB Person TT Plan:17314}  2.  Short Term Objective (s) - How will we reach this goal?:   A. Provider new recommended medication/dosage changes and/or continue medication(s): {AMB SHORT TERM OBJECTIVE MEDS:09253}.  B. {AMB SHORT TERM OTHER OBJECTIVES:99455::\"N/A\"}.  C. {AMB SHORT TERM OTHER OBJECTIVES:62665::\"N/A\"}.  Target Date:{AMB TREATMENT PLAN TARGET DATE:34824}  Person/Persons Responsible for Completion of Goal: {AMB Person TT Plan:56165}  Progress Towards Goals: {AMB Progress Towards Goals TT Plan:94555}  Treatment Modality: {AMB TREATMENT MODALITY:71555}  Review due 180 days from date of this plan: {AMB TREATMENT PLAN REVIEW DUE:21548}  Expected length of service: {AMB LOS:42100}  My Physician/PA/NP and I have developed this plan together and I agree to work on the goals and objectives. I understand the treatment goals that were developed for my treatment.      "

## 2024-08-14 ENCOUNTER — OFFICE VISIT (OUTPATIENT)
Dept: PSYCHIATRY | Facility: CLINIC | Age: 49
End: 2024-08-14
Payer: COMMERCIAL

## 2024-08-14 DIAGNOSIS — F32.A DEPRESSIVE DISORDER: ICD-10-CM

## 2024-08-14 DIAGNOSIS — F41.1 GENERALIZED ANXIETY DISORDER: ICD-10-CM

## 2024-08-14 DIAGNOSIS — G47.30 SLEEP APNEA, UNSPECIFIED TYPE: Primary | ICD-10-CM

## 2024-08-14 PROCEDURE — 99213 OFFICE O/P EST LOW 20 MIN: CPT | Performed by: PSYCHIATRY & NEUROLOGY

## 2024-08-14 RX ORDER — TRAZODONE HYDROCHLORIDE 100 MG/1
100 TABLET ORAL
Qty: 30 TABLET | Refills: 1 | Status: SHIPPED | OUTPATIENT
Start: 2024-08-14

## 2024-08-14 NOTE — PSYCH
Ibrahima Adams 1975 [id number]     The patient was seen for continuing care and pharmacotherapy.  He was accompanied by his wife.  Episodes of altered alertness/awareness are less frequent and shorter.  Sleep has improved.  His level of anxiety remains high and is mainly related to his work situation and not being able to contribute to the household financially the way he would like to.  Recently he visited his sister out of state and for the whole week, he did not have any of these episodes.  Sometimes he wakes up in the middle of the night and snores.  He does not remember any nightmares.  Appetite is adequate        ROS:  As above  Current Mental Status Evaluation:  Appearance:  Adequate hygiene and grooming and Good eye contact   Behavior:  Cooperative and Restless and Fidgety   Mood:  Anxious   Affect: appropriate and broad   Speech: Normal volume and Normal rate   Thought Process:  Goal directed and coherent   Thought Content:  Does not verbalize delusional material   Perceptual Disturbances: Denies hallucinations and does not appear to be responding to internal stimuli   Risk Potential: No suicidal or homicidal ideation   Orientation:         Diagnosis ICD-10-CM Associated Orders   1. Sleep apnea, unspecified type  G47.30 Ambulatory Referral to Sleep Medicine      2. Generalized anxiety disorder  F41.1 traZODone (DESYREL) 100 mg tablet      3. Depressive disorder  F32.A traZODone (DESYREL) 100 mg tablet             Current Outpatient Medications   Medication Instructions    busPIRone (BUSPAR) 15 mg, Oral, 3 times daily    sertraline (ZOLOFT) 100 mg, Oral, Daily, He takes this medication in the morning    traZODone (DESYREL) 100 mg, Oral, Daily at bedtime          Treatment Recommendations- Risks Benefits    The patient will continue with psychotherapy on a weekly basis.  Pharmacologically, there will be no changes indicated at this time.  Follow-up in 2 months.  A referral to sleep center was made to  evaluate for sleep apnea  Risks, Benefits And Possible Side Effects Of Medications:  Risks, benefits, and possible side effects of medications explained to patient and patient verbalizes understanding    VIRTUAL VISIT DISCLAIMER    Ibrahima Adams verbally agrees to participate in Virtual Care Services. Pt is aware that Virtual Care Services could be limited without vital signs or the ability to perform a full hands-on physical exam. Ibrahima Adams understands he or the provider may request at any time to terminate the video visit and request the patient to seek care or treatment in person.     Jimmie Hung MD 08/14/24

## 2024-09-12 DIAGNOSIS — F41.1 GENERALIZED ANXIETY DISORDER: ICD-10-CM

## 2024-09-12 DIAGNOSIS — F32.A DEPRESSIVE DISORDER: ICD-10-CM

## 2024-09-12 RX ORDER — SERTRALINE HYDROCHLORIDE 100 MG/1
100 TABLET, FILM COATED ORAL EVERY MORNING
Qty: 30 TABLET | Refills: 1 | Status: SHIPPED | OUTPATIENT
Start: 2024-09-12

## 2024-09-17 NOTE — TELEPHONE ENCOUNTER
----- Message from Roger Medina MD sent at 5/17/2024 11:02 AM EDT -----  Regarding: urgent EMU admission  Can you schedule this patient for an urgent EMU admission on 5/29/2024 - I'm the EMU attending, if he can come in at 12PM that would be better because I have a meeting at 9AM for the fellowship. Or the next day.    He has near daily episodes that needs to be clarified if they are seizures or not.    Please call Morena Karl (wife) [phone] to arrange for admission because he may not be aware of phone calls if he is having an episode.    Teresa  
ADT order signed  
Called back re: below and spoke with pt's wife, Morena, who verbalized an understanding. Per Morena, she is just going to cancel next weeks appt. This way there will be no concerns for either provider of privacy concerns, etc.    Dr. Medina - just an FYI.  
Called re: below to inform that pt can keep his virtual appt while on the EMU, and spoke with pt's wife Morena, who verbalized an understanding.    Morena had another concern that she brought up at this time. She is asking if the video monitoring that he is on includes sound? If it does, she is not sure if pt's psychologist would be comfortable having their session recorded while he is having his telehealth visit.    Please advise. Thank you.    Roger Medina MD   to Neurology [place] Clinical Team 1       5/23/24  8:01 AM   Yes he can still have his telehealth visit with his psychologist.  They just have to remind me and the nurse for the day so that we can post a sign on his door to do not enter as he will be in the middle of a session.  The only time the nurses should enter is if he is having a clinical event during the session.    Teresa  
Called to schedule EMU admission and spoke with pt's wife, Morena. EMU scheduled for Wednesday 5/29/24 at 12:00 pm with Dr. Medina. Pt added to [place] calendar. EMU education sheet reviewed with Morena and emailed to her and also sent through [facility]. ADT21 order placed. Encounter forwarded to Pre-Cert and ordering provider.  
EMU - video EEG monitoring, includes audio recording for seizure characterization.  If there is no clinical event then the video and audio records are deleted.    For patient's safety, we cannot stop the video/audio recording.    The Audio is only monitored by the EEG tech if there is a clinical event; so generally, no one is listening unless he has a seizure.  
DISPLAY PLAN FREE TEXT

## 2024-09-25 DIAGNOSIS — F41.1 GENERALIZED ANXIETY DISORDER: ICD-10-CM

## 2024-09-26 RX ORDER — BUSPIRONE HYDROCHLORIDE 15 MG/1
15 TABLET ORAL 3 TIMES DAILY
Qty: 90 TABLET | Refills: 1 | Status: SHIPPED | OUTPATIENT
Start: 2024-09-26

## 2024-10-02 ENCOUNTER — TELEPHONE (OUTPATIENT)
Age: 49
End: 2024-10-02

## 2024-10-09 ENCOUNTER — TELEPHONE (OUTPATIENT)
Dept: PSYCHIATRY | Facility: CLINIC | Age: 49
End: 2024-10-09

## 2024-10-25 DIAGNOSIS — F32.A DEPRESSIVE DISORDER: ICD-10-CM

## 2024-10-25 DIAGNOSIS — F41.1 GENERALIZED ANXIETY DISORDER: ICD-10-CM

## 2024-10-25 RX ORDER — TRAZODONE HYDROCHLORIDE 100 MG/1
100 TABLET ORAL
Qty: 30 TABLET | Refills: 1 | Status: SHIPPED | OUTPATIENT
Start: 2024-10-25

## 2024-11-13 ENCOUNTER — OFFICE VISIT (OUTPATIENT)
Dept: PSYCHIATRY | Facility: CLINIC | Age: 49
End: 2024-11-13
Payer: COMMERCIAL

## 2024-11-13 DIAGNOSIS — F32.A DEPRESSIVE DISORDER: ICD-10-CM

## 2024-11-13 DIAGNOSIS — F95.2 TOURETTE SYNDROME: Primary | ICD-10-CM

## 2024-11-13 DIAGNOSIS — F41.1 GENERALIZED ANXIETY DISORDER: ICD-10-CM

## 2024-11-13 PROCEDURE — 99214 OFFICE O/P EST MOD 30 MIN: CPT | Performed by: PSYCHIATRY & NEUROLOGY

## 2024-11-13 RX ORDER — BUSPIRONE HYDROCHLORIDE 15 MG/1
15 TABLET ORAL 3 TIMES DAILY
Qty: 90 TABLET | Refills: 1 | Status: SHIPPED | OUTPATIENT
Start: 2024-11-13

## 2024-11-13 RX ORDER — RISPERIDONE 1 MG/1
1 TABLET ORAL DAILY
Qty: 30 TABLET | Refills: 1 | Status: SHIPPED | OUTPATIENT
Start: 2024-11-13

## 2024-11-13 RX ORDER — TRAZODONE HYDROCHLORIDE 100 MG/1
100 TABLET ORAL
Qty: 30 TABLET | Refills: 1 | Status: SHIPPED | OUTPATIENT
Start: 2024-11-13

## 2024-11-13 RX ORDER — SERTRALINE HYDROCHLORIDE 100 MG/1
100 TABLET, FILM COATED ORAL EVERY MORNING
Qty: 30 TABLET | Refills: 1 | Status: SHIPPED | OUTPATIENT
Start: 2024-11-13

## 2024-11-13 NOTE — PSYCH
"  Ibrahima Adams 1975 [id number]     The patient was seen for continuing care and pharmacotherapy.Accompanied by his wife. He started a new job lately. Started having episodes of getting \"hysterical\" intermittently with shouting curse words repetitively and was agitated.Also verbally harsh with his wife. He often doesn't recall these episodes.He had the episode once at work.Sleep and appetite are normal.He is functioning at work. He continues to see his psychotherapist.  ROS:  As HPI  Current Mental Status Evaluation:  Appearance:  Adequate hygiene and grooming and Good eye contact   Behavior:  Calm and Cooperative   Mood:  Anxious   Affect: appropriate and broad   Speech: Normal volume and Normal rate   Thought Process:  Goal directed and coherent   Thought Content:  Does not verbalize delusional material   Perceptual Disturbances: Denies hallucinations and does not appear to be responding to internal stimuli   Risk Potential: No suicidal or homicidal ideation   Orientation:         Diagnosis ICD-10-CM Associated Orders   1. Tourette syndrome  F95.2 risperiDONE (RisperDAL) 1 mg tablet      2. Generalized anxiety disorder  F41.1 busPIRone (BUSPAR) 15 mg tablet     traZODone (DESYREL) 100 mg tablet     sertraline (ZOLOFT) 100 mg tablet      3. Depressive disorder  F32.A traZODone (DESYREL) 100 mg tablet     sertraline (ZOLOFT) 100 mg tablet             Current Outpatient Medications   Medication Instructions    busPIRone (BUSPAR) 15 mg, Oral, 3 times daily    risperiDONE (RISPERDAL) 1 mg, Oral, Daily    sertraline (ZOLOFT) 100 mg, Oral, Every morning    traZODone (DESYREL) 100 mg, Oral, Daily at bedtime          Treatment Recommendations- Risks Benefits    With the impression of Tourette's will add Risperdal 1 mg and titrate as needed. Continue with other meds the same  Risks, Benefits And Possible Side Effects Of Medications:  Risks, benefits, and possible side effects of medications explained to patient and " patient verbalizes understanding    VIRTUAL VISIT DISCLAIMER    Ibrahima Adams verbally agrees to participate in Virtual Care Services. Pt is aware that Virtual Care Services could be limited without vital signs or the ability to perform a full hands-on physical exam. Ibrahima Adams understands he or the provider may request at any time to terminate the video visit and request the patient to seek care or treatment in person.     Jimmie Hung MD 11/13/24

## 2024-12-10 ENCOUNTER — TELEPHONE (OUTPATIENT)
Dept: FAMILY MEDICINE CLINIC | Facility: CLINIC | Age: 49
End: 2024-12-10

## 2024-12-10 NOTE — TELEPHONE ENCOUNTER
Called patient, he scheduled himself for 1/20 for an appt for body aches.  I called him to see if he wanted to come in this Friday or sooner than 1/20.  LVM for him to call back.  Please transfer call to the office.

## 2024-12-10 NOTE — TELEPHONE ENCOUNTER
Problem: Coping  Goal: Pt/Family able to verbalize concerns and demonstrate effective coping strategies  Description: INTERVENTIONS:  1. Assist patient/family to identify coping skills, available support systems and cultural and spiritual values  2. Provide emotional support, including active listening and acknowledgement of concerns of patient and caregivers  3. Reduce environmental stimuli, as able  4. Instruct patient/family in relaxation techniques, as appropriate  5. Assess for spiritual pain/suffering and initiate Spiritual Care, Psychosocial Clinical Specialist consults as needed  10/12/2024 2210 by Ovi Vital, RN  Outcome: Not Progressing  Flowsheets (Taken 10/12/2024 2146)  Patient/family able to verbalize anxieties, fears, and concerns, and demonstrate effective coping: Assist patient/family to identify coping skills, available support systems and cultural and spiritual values  Note: Patient did not attend any group today .   10/12/2024 0911 by Humes, Heather, RN  Outcome: Not Progressing  Flowsheets (Taken 10/11/2024 2211 by Ovi Vital RN)  Patient/family able to verbalize anxieties, fears, and concerns, and demonstrate effective coping: Assist patient/family to identify coping skills, available support systems and cultural and spiritual values  Note: Patient encouraged to attend groups in order to further develop coping skills. Patient self isolates to room at this time.      Problem: Coping  Goal: Pt/Family able to verbalize concerns and demonstrate effective coping strategies  Description: INTERVENTIONS:  1. Assist patient/family to identify coping skills, available support systems and cultural and spiritual values  2. Provide emotional support, including active listening and acknowledgement of concerns of patient and caregivers  3. Reduce environmental stimuli, as able  4. Instruct patient/family in relaxation techniques, as appropriate  5. Assess for spiritual  Pt called in stating he missed a call from the office. Warm transferred him over to Rachel in the office.

## 2024-12-11 ENCOUNTER — TELEPHONE (OUTPATIENT)
Dept: PSYCHIATRY | Facility: CLINIC | Age: 49
End: 2024-12-11

## 2024-12-11 NOTE — TELEPHONE ENCOUNTER
Called and left message for patient to inform 1/15/25 & 2/12/25 was cancelled due to provider schedule change. Requested return call to reschedule. Please schedule upon return call. Thank you.

## 2024-12-13 ENCOUNTER — OFFICE VISIT (OUTPATIENT)
Dept: FAMILY MEDICINE CLINIC | Facility: CLINIC | Age: 49
End: 2024-12-13
Payer: COMMERCIAL

## 2024-12-13 VITALS
OXYGEN SATURATION: 99 % | WEIGHT: 174 LBS | SYSTOLIC BLOOD PRESSURE: 120 MMHG | HEIGHT: 72 IN | DIASTOLIC BLOOD PRESSURE: 80 MMHG | BODY MASS INDEX: 23.57 KG/M2 | HEART RATE: 61 BPM

## 2024-12-13 DIAGNOSIS — M72.2 PLANTAR FASCIAL FIBROMATOSIS OF RIGHT FOOT: Primary | ICD-10-CM

## 2024-12-13 PROCEDURE — 99213 OFFICE O/P EST LOW 20 MIN: CPT | Performed by: FAMILY MEDICINE

## 2024-12-13 NOTE — PROGRESS NOTES
Subjective:      Patient ID: Ibrahima Adams is a 49 y.o. male.    49-year-old male presents with his wife complaining of right foot pain.  Patient has flatfeet.  Recently started back to work in steel toe boots on concrete floor for 10 hours/day.  On days when he is not working his foot does not hurt.  In the past he was told that he had plantar fasciitis by podiatrist and he had steroid injection in his foot and was told that he could have inserts made.  Had been out of work due to diagnosis of conversion disorder for which she is doing quite well        Past Medical History:   Diagnosis Date   • Chronic back pain        Family History   Problem Relation Age of Onset   • Cirrhosis Mother    • Cholelithiasis Mother    • Diabetes type II Father    • Stroke Maternal Grandfather    • Diabetes Paternal Grandfather        Past Surgical History:   Procedure Laterality Date   • WISDOM TOOTH EXTRACTION          reports that he has quit smoking. His smoking use included cigars. He has never used smokeless tobacco. He reports current alcohol use of about 2.0 standard drinks of alcohol per week. He reports that he does not currently use drugs after having used the following drugs: Marijuana, Cocaine, and Oxycodone.      Current Outpatient Medications:   •  busPIRone (BUSPAR) 15 mg tablet, Take 1 tablet (15 mg total) by mouth 3 (three) times a day, Disp: 90 tablet, Rfl: 1  •  risperiDONE (RisperDAL) 1 mg tablet, Take 1 tablet (1 mg total) by mouth daily, Disp: 30 tablet, Rfl: 1  •  sertraline (ZOLOFT) 100 mg tablet, Take 1 tablet (100 mg total) by mouth every morning, Disp: 30 tablet, Rfl: 1  •  traZODone (DESYREL) 100 mg tablet, Take 1 tablet (100 mg total) by mouth daily at bedtime, Disp: 30 tablet, Rfl: 1    The following portions of the patient's history were reviewed and updated as appropriate: allergies, current medications, past family history, past medical history, past social history, past surgical history and problem  list.    Review of Systems   Musculoskeletal:         Right foot pain           Objective:    /80   Pulse 61   Ht 6' (1.829 m)   Wt 78.9 kg (174 lb)   SpO2 99%   BMI 23.60 kg/m²      Physical Exam  Vitals and nursing note reviewed.   Constitutional:       Appearance: Normal appearance.   Musculoskeletal:      Comments: Bilateral pes planus    Patient does have plantar fascial fibromatosis of the right foot with pain near the ball of his foot.  No significant swelling   Neurological:      Mental Status: He is alert.           No results found for this or any previous visit (from the past 6 weeks).    Assessment/Plan:    Plantar fascial fibromatosis of right foot  - Advised patient and his wife that he can be referred to podiatry but he does not want to undergo injections as he previously did as they were painful and not covered by his insurance    -I discussed conservative treatments such as golf ball massage, wearing a [place] sock at night, ice massage after work and getting cushioned insoles for his steel tip work boots    If those measures are not working then he can always be referred to podiatry          Problem List Items Addressed This Visit        Musculoskeletal and Integument    Plantar fascial fibromatosis of right foot - Primary    - Advised patient and his wife that he can be referred to podiatry but he does not want to undergo injections as he previously did as they were painful and not covered by his insurance    -I discussed conservative treatments such as golf ball massage, wearing a [place] sock at night, ice massage after work and getting cushioned insoles for his steel tip work boots    If those measures are not working then he can always be referred to podiatry

## 2024-12-13 NOTE — ASSESSMENT & PLAN NOTE
- Advised patient and his wife that he can be referred to podiatry but he does not want to undergo injections as he previously did as they were painful and not covered by his insurance    -I discussed conservative treatments such as golf ball massage, wearing a [place] sock at night, ice massage after work and getting cushioned insoles for his steel tip work boots    If those measures are not working then he can always be referred to podiatry

## 2024-12-16 ENCOUNTER — OFFICE VISIT (OUTPATIENT)
Dept: PSYCHIATRY | Facility: CLINIC | Age: 49
End: 2024-12-16
Payer: COMMERCIAL

## 2024-12-16 DIAGNOSIS — F95.2 TOURETTE SYNDROME: ICD-10-CM

## 2024-12-16 DIAGNOSIS — F32.A DEPRESSIVE DISORDER: ICD-10-CM

## 2024-12-16 DIAGNOSIS — F41.1 GENERALIZED ANXIETY DISORDER: ICD-10-CM

## 2024-12-16 PROCEDURE — 99213 OFFICE O/P EST LOW 20 MIN: CPT | Performed by: PSYCHIATRY & NEUROLOGY

## 2024-12-16 RX ORDER — RISPERIDONE 1 MG/1
1 TABLET ORAL DAILY
Qty: 30 TABLET | Refills: 1 | Status: SHIPPED | OUTPATIENT
Start: 2024-12-16

## 2024-12-16 RX ORDER — SERTRALINE HYDROCHLORIDE 100 MG/1
100 TABLET, FILM COATED ORAL EVERY MORNING
Qty: 30 TABLET | Refills: 1 | Status: SHIPPED | OUTPATIENT
Start: 2024-12-16

## 2024-12-16 RX ORDER — BUSPIRONE HYDROCHLORIDE 15 MG/1
15 TABLET ORAL 2 TIMES DAILY
Qty: 60 TABLET | Refills: 1 | Status: SHIPPED | OUTPATIENT
Start: 2024-12-16

## 2024-12-16 RX ORDER — TRAZODONE HYDROCHLORIDE 100 MG/1
100 TABLET ORAL
Qty: 30 TABLET | Refills: 1 | Status: SHIPPED | OUTPATIENT
Start: 2024-12-16

## 2024-12-16 NOTE — BH TREATMENT PLAN
TREATMENT PLAN (Medication Management Only)        [facility]    Name and Date of Birth:  Ibrahima Adams 49 y.o. 1975  Date of Treatment Plan: December 16, 2024  Diagnosis/Diagnoses:    1. Generalized anxiety disorder    2. Tourette syndrome    3. Depressive disorder      Strengths/Personal Resources for Self-Care: supportive family, taking medications as prescribed, ability to communicate needs, ability to understand psychiatric illness, family ties.  Area/Areas of need (in own words): anxiety symptoms  1. Long Term Goal: maintain control of anxiety.  Target Date:1 year - 12/16/2025  Person/Persons responsible for completion of goal: Ibrahima  2.  Short Term Objective (s) - How will we reach this goal?:   A. Provider new recommended medication/dosage changes and/or continue medication(s): continue current medications as prescribed.  B. N/A.  C. N/A.  Target Date:2 months - 2/16/2025  Person/Persons Responsible for Completion of Goal: Ibrahima  Progress Towards Goals: stable, continuing treatment  Treatment Modality: medication management every 2 months  Review due 180 days from date of this plan: 6 months - 6/16/2025  Expected length of service: ongoing treatment  My Physician/PA/NP and I have developed this plan together and I agree to work on the goals and objectives. I understand the treatment goals that were developed for my treatment.

## 2024-12-16 NOTE — PSYCH
Ibrahima Adams 1975 [id number]     The patient was seen for continuing care and pharmacotherapy.  His wife was present.  Reports improvement with less frequent and milder episodes.  Last one was 11 days ago and was mild.  Sleep and appetite are adequate.  He has not had any stiffness or tremor.  He is working full-time feels comfortable with that.        ROS:  No symptoms  Current Mental Status Evaluation:  Appearance:  Adequate hygiene and grooming and Good eye contact   Behavior:  Calm and Cooperative   Mood:  Euthymic   Affect: appropriate   Speech: Normal volume and Normal rate   Thought Process:  Goal directed and coherent   Thought Content:  Does not verbalize delusional material   Perceptual Disturbances: Denies hallucinations and does not appear to be responding to internal stimuli   Risk Potential: No suicidal or homicidal ideation   Orientation:         Diagnosis ICD-10-CM Associated Orders   1. Generalized anxiety disorder  F41.1 busPIRone (BUSPAR) 15 mg tablet     traZODone (DESYREL) 100 mg tablet     sertraline (ZOLOFT) 100 mg tablet      2. Tourette syndrome  F95.2 risperiDONE (RisperDAL) 1 mg tablet      3. Depressive disorder  F32.A traZODone (DESYREL) 100 mg tablet     sertraline (ZOLOFT) 100 mg tablet             Current Outpatient Medications   Medication Instructions    busPIRone (BUSPAR) 15 mg, Oral, 2 times daily    risperiDONE (RISPERDAL) 1 mg, Oral, Daily    sertraline (ZOLOFT) 100 mg, Oral, Every morning    traZODone (DESYREL) 100 mg, Oral, Daily at bedtime          Treatment Recommendations- Risks Benefits    We will reduce buspirone to 15 mg twice daily.  We will continue with other medications the same.  Follow up in 2 months  Risks, Benefits And Possible Side Effects Of Medications:  Risks, benefits, and possible side effects of medications explained to patient and patient verbalizes understanding    VIRTUAL VISIT DISCLAIMER    Ibrahima Adams verbally agrees to participate in  Virtual Care Services. Pt is aware that Virtual Care Services could be limited without vital signs or the ability to perform a full hands-on physical exam. Ibrahima Adams understands he or the provider may request at any time to terminate the video visit and request the patient to seek care or treatment in person.     Jimmie Hung MD 12/16/24

## 2025-01-07 ENCOUNTER — TELEPHONE (OUTPATIENT)
Age: 50
End: 2025-01-07

## 2025-01-07 NOTE — TELEPHONE ENCOUNTER
Contacted patient off of Talk [facility]  to verify needs of services in attempts to offer patient an appointment. Established patient of med mgmt. LVM for patient to contact intake dept  in regards to scheduling.     1st attempt

## 2025-02-03 ENCOUNTER — TELEPHONE (OUTPATIENT)
Age: 50
End: 2025-02-03

## 2025-02-03 DIAGNOSIS — F95.2 TOURETTE SYNDROME: ICD-10-CM

## 2025-02-03 RX ORDER — RISPERIDONE 1 MG/1
1 TABLET ORAL 2 TIMES DAILY
Qty: 60 TABLET | Refills: 1 | Status: SHIPPED | OUTPATIENT
Start: 2025-02-03

## 2025-02-03 NOTE — TELEPHONE ENCOUNTER
Patients spouse called in regarding the below update, for the provider:    -Patient is having frequent/ongoing episodes  -They would like to discuss increasing the patients medication    Please contact the patient back, and if no answer please call Morena:  [phone]    *If patient is not able to answer the phone, Morena can prefers to be contacted to assist.    Writer advised they would route the message as high priority, for the patient.  Thank you.

## 2025-02-03 NOTE — PROGRESS NOTES
Spoke with patient's wife.  Over the past few weeks, Doc has had more frequent episodes of previous behaviors in the form of shouting and being unaware of the surroundings but he has managed to continue to work.  We will increase risperidone to 1 mg twice daily.  He has an appointment with me in 2 weeks

## 2025-02-17 ENCOUNTER — TELEMEDICINE (OUTPATIENT)
Dept: PSYCHIATRY | Facility: CLINIC | Age: 50
End: 2025-02-17
Payer: COMMERCIAL

## 2025-02-17 DIAGNOSIS — F32.A DEPRESSIVE DISORDER: ICD-10-CM

## 2025-02-17 DIAGNOSIS — F41.1 GENERALIZED ANXIETY DISORDER: ICD-10-CM

## 2025-02-17 DIAGNOSIS — F95.2 TOURETTE SYNDROME: ICD-10-CM

## 2025-02-17 PROCEDURE — 99214 OFFICE O/P EST MOD 30 MIN: CPT | Performed by: PSYCHIATRY & NEUROLOGY

## 2025-02-17 RX ORDER — RISPERIDONE 1 MG/1
TABLET ORAL
Qty: 90 TABLET | Refills: 1 | Status: SHIPPED | OUTPATIENT
Start: 2025-02-17

## 2025-02-17 RX ORDER — SERTRALINE HYDROCHLORIDE 100 MG/1
100 TABLET, FILM COATED ORAL EVERY MORNING
Qty: 30 TABLET | Refills: 1 | Status: SHIPPED | OUTPATIENT
Start: 2025-02-17

## 2025-02-17 RX ORDER — TRAZODONE HYDROCHLORIDE 100 MG/1
100 TABLET ORAL
Qty: 30 TABLET | Refills: 1 | Status: SHIPPED | OUTPATIENT
Start: 2025-02-17 | End: 2025-02-17 | Stop reason: SDUPTHER

## 2025-02-17 RX ORDER — TRAZODONE HYDROCHLORIDE 100 MG/1
100 TABLET ORAL
Qty: 30 TABLET | Refills: 1 | Status: SHIPPED | OUTPATIENT
Start: 2025-02-17

## 2025-02-17 RX ORDER — BUSPIRONE HYDROCHLORIDE 15 MG/1
15 TABLET ORAL 2 TIMES DAILY
Qty: 60 TABLET | Refills: 0 | Status: SHIPPED | OUTPATIENT
Start: 2025-02-17

## 2025-02-17 RX ORDER — BUSPIRONE HYDROCHLORIDE 15 MG/1
15 TABLET ORAL 2 TIMES DAILY
Qty: 60 TABLET | Refills: 1 | Status: SHIPPED | OUTPATIENT
Start: 2025-02-17 | End: 2025-02-17 | Stop reason: SDUPTHER

## 2025-02-17 NOTE — PSYCH
Virtual Regular VisitName: Ibrahima Adams      : 1975      MRN: [id number]  Encounter Provider: Jimmie Hung MD  Encounter Date: 2025   Encounter department: [facility] [place]  :  Assessment & Plan  Generalized anxiety disorder    Orders:    busPIRone (BUSPAR) 15 mg tablet; Take 1 tablet (15 mg total) by mouth 2 (two) times a day    traZODone (DESYREL) 100 mg tablet; Take 1 tablet (100 mg total) by mouth daily at bedtime    sertraline (ZOLOFT) 100 mg tablet; Take 1 tablet (100 mg total) by mouth every morning    Depressive disorder    Orders:    traZODone (DESYREL) 100 mg tablet; Take 1 tablet (100 mg total) by mouth daily at bedtime    sertraline (ZOLOFT) 100 mg tablet; Take 1 tablet (100 mg total) by mouth every morning    Tourette syndrome    Orders:    risperiDONE (RisperDAL) 1 mg tablet; 2 tablets before dinner (2 mg) and 1 mg before bedtime        Goals addressed in session: Goal 1     Depression Follow-up Plan Completed: Not applicable    Reason for visit is   Chief Complaint   Patient presents with    Follow-up    Virtual Regular Visit      Recent Visits  No visits were found meeting these conditions.  Showing recent visits within past 7 days and meeting all other requirements  Today's Visits  Date Type Provider Dept   25 Telemedicine Jimmie Hung MD  [facility]   Showing today's visits and meeting all other requirements  Future Appointments  No visits were found meeting these conditions.  Showing future appointments within next 150 days and meeting all other requirements     History of Present Illness     Ibrahima Adams is a 49 y.o. male with history of anxiety and depression as well as Tourette's syndrome.  In the past 2 weeks he has had multiple episodes consistent with Tourette's.  I spoke with his wife 2 weeks ago and due to increase in the frequency of these episodes we increased risperidone to 1 mg twice daily.  No significant  improvement has been noted.  The persistence of these episodes which become disruptive to family life's routine has increased his level of anxiety.  There have been times that he continues to display disruptive behavior and repeating same things loudly throughout the night.  Appetite has been normal he does not feel sad.  He has not experienced any psychotic symptoms    Past Medical History:   Diagnosis Date    Chronic back pain      Past Surgical History:   Procedure Laterality Date    WISDOM TOOTH EXTRACTION       Current Outpatient Medications   Medication Instructions    busPIRone (BUSPAR) 15 mg, Oral, 2 times daily    risperiDONE (RisperDAL) 1 mg tablet 2 tablets before dinner (2 mg) and 1 mg before bedtime    sertraline (ZOLOFT) 100 mg, Oral, Every morning    traZODone (DESYREL) 100 mg, Oral, Daily at bedtime     No Known Allergies        Objective   There were no vitals taken for this visit.    Video Exam  Mental status:  Appearance calm and cooperative , adequate hygiene and grooming, and good eye contact    Mood anxious   Affect affect appropriate    Speech Normal rate and Normal volume   Thought Processes coherent/organized   Hallucinations no hallucinations present    Thought Content no delusional thoughts verbalized   Abnormal Thoughts no suicidal thoughts    Orientation A+O x 3     Treatment Recommendations- Risks Benefits    We will increase risperidone to a total of 3 mg daily.  He will let me know in 1 week how his condition is.  At that time, we could either make further adjustments or switch from risperidone to another medication.  Depressive symptoms appear to be under adequate control  Risks, Benefits And Possible Side Effects Of Medications:  Risks, benefits, and possible side effects of medications explained to patient and patient verbalizes understanding                      Administrative Statements   Encounter provider Jimmie Hung MD    The Patient is located at Home and in the  following state in which I hold an active license [place].  His wife was also present during the interview    The patient was identified by name and date of birth. Ibrahima Adams was informed that this is a telemedicine visit and that the visit is being conducted through the Epic Embedded platform. He agrees to proceed..  My office door was closed. No one else was in the room.  He acknowledged consent and understanding of privacy and security of the video platform. The patient has agreed to participate and understands they can discontinue the visit at any time.    I have spent a total time of 14 minutes in caring for this patient on the day of the visit/encounter including Patient and family education.    Visit Time    Visit Start Time: 10:00 a,m  Visit Stop Time: 10:14 am  Total Visit Duration:  20 minutes including documentation and ordering medications

## 2025-02-17 NOTE — ASSESSMENT & PLAN NOTE
Orders:    busPIRone (BUSPAR) 15 mg tablet; Take 1 tablet (15 mg total) by mouth 2 (two) times a day    traZODone (DESYREL) 100 mg tablet; Take 1 tablet (100 mg total) by mouth daily at bedtime    sertraline (ZOLOFT) 100 mg tablet; Take 1 tablet (100 mg total) by mouth every morning

## 2025-03-03 ENCOUNTER — TELEPHONE (OUTPATIENT)
Age: 50
End: 2025-03-03

## 2025-03-03 NOTE — TELEPHONE ENCOUNTER
Returned Morena's call.  Morena states that Ibrahima has been having one of his psychotic episodes which started Wednesday night.  Reports that it started with the humming, then the shouting, the swearing and the body movements all day and all night.  States he calmed down on Thursday morning and wanted to go to work.  However, his boss called her and told her to come pick him up because he wasn't safe to stay there.  She reports that he got much worse again.  On Friday night or Saturday she noticed a rash on his chest and abdomen.  She is not sure if it is from his medication or from his sweating. It is confined to that area. No other symptoms associated with the rash.  She wants to update provider with this information.  She also reports some disassociation.  States walls are thin so neighbors are also being affected by Ibrahima's episode.  States he has calmed down some so far today.  She is requesting provider recommendation.    Will refer to Dr Stoddard for review.

## 2025-03-03 NOTE — TELEPHONE ENCOUNTER
Patient wife called office in regards to patient starting to have episodes again. She states he is starting to hum, hes shouting, and swearing all hours of the day and night. She stated that he had to call out again today from work and last week his work called wife to tell her to come pick him up. She informed writer he is keeping wife and neighbors up at night due to living in an apartment with thin walls. Wife also mentioned a rash on chest that started on Friday night, and she thinks its possibly a side effect. Patient was taking 2mg Risperdal and was recently increased to 3mg. She is asking to call her instead of patient due to everything going on. She can be reached at [phone]

## 2025-03-04 DIAGNOSIS — F95.2 TOURETTE SYNDROME: ICD-10-CM

## 2025-03-04 RX ORDER — RISPERIDONE 2 MG/1
TABLET ORAL
Qty: 60 TABLET | Refills: 1 | Status: SHIPPED | OUTPATIENT
Start: 2025-03-04

## 2025-03-04 NOTE — TELEPHONE ENCOUNTER
Patients wife returned call and said no vm was left on her phone. She is requesting a call to her at [phone]

## 2025-03-27 ENCOUNTER — TELEPHONE (OUTPATIENT)
Age: 50
End: 2025-03-27

## 2025-03-27 NOTE — TELEPHONE ENCOUNTER
Ibrahima Adams is requesting a letter for his job to be completed.     Date due: asap    Special notes: Pts job requested a letter stating he is ok to work and under a Dr's care and what medications he is taking.       Informed Ibrahima Adams and/or Parent/Guardian that a YAZMIN must be completed in order for letter/form to be provided.

## 2025-03-28 NOTE — TELEPHONE ENCOUNTER
Patient come in to sign an YAZMIN for a clearance letter for his work.    Once done it is to be fax to number provider in YAZMIN.  Patient is aware that there is charges for the letter.    Forms will be scan and please follow up with the letter.

## 2025-04-02 NOTE — TELEPHONE ENCOUNTER
Patient called in regard to Return to Work letter status. Writer contacted office and provider is out right now. Writer will forward to CM for assistance with letter. Patient stated he would like to attend back to work as soon as possible. Patient signed YAZMIN etc per prior note.

## 2025-04-14 ENCOUNTER — TELEPHONE (OUTPATIENT)
Dept: PSYCHIATRY | Facility: CLINIC | Age: 50
End: 2025-04-14

## 2025-04-14 ENCOUNTER — TELEMEDICINE (OUTPATIENT)
Dept: PSYCHIATRY | Facility: CLINIC | Age: 50
End: 2025-04-14
Payer: COMMERCIAL

## 2025-04-14 DIAGNOSIS — F32.A DEPRESSIVE DISORDER: ICD-10-CM

## 2025-04-14 DIAGNOSIS — F95.2 TOURETTE SYNDROME: Primary | ICD-10-CM

## 2025-04-14 DIAGNOSIS — F41.1 GENERALIZED ANXIETY DISORDER: ICD-10-CM

## 2025-04-14 PROCEDURE — 99214 OFFICE O/P EST MOD 30 MIN: CPT | Performed by: PSYCHIATRY & NEUROLOGY

## 2025-04-14 RX ORDER — SERTRALINE HYDROCHLORIDE 100 MG/1
100 TABLET, FILM COATED ORAL EVERY MORNING
Qty: 30 TABLET | Refills: 1 | Status: SHIPPED | OUTPATIENT
Start: 2025-04-14

## 2025-04-14 RX ORDER — BUSPIRONE HYDROCHLORIDE 15 MG/1
15 TABLET ORAL 2 TIMES DAILY
Qty: 60 TABLET | Refills: 0 | Status: SHIPPED | OUTPATIENT
Start: 2025-04-14

## 2025-04-14 RX ORDER — TRAZODONE HYDROCHLORIDE 100 MG/1
100 TABLET ORAL
Qty: 30 TABLET | Refills: 1 | Status: SHIPPED | OUTPATIENT
Start: 2025-04-14

## 2025-04-14 RX ORDER — RISPERIDONE 3 MG/1
3 TABLET ORAL
Qty: 30 TABLET | Refills: 1 | Status: SHIPPED | OUTPATIENT
Start: 2025-04-14

## 2025-04-14 RX ORDER — RISPERIDONE 2 MG/1
TABLET ORAL
Qty: 30 TABLET | Refills: 1 | Status: SHIPPED | OUTPATIENT
Start: 2025-04-14

## 2025-04-14 NOTE — TELEPHONE ENCOUNTER
Called and left message for patient to return a call to [phone] and schedule a follow up with provider (Jimmie Hung). Please schedule upon return call. Thank you.

## 2025-04-14 NOTE — PSYCH
MEDICATION MANAGEMENT NOTE    PSYCHIATRIC VIRTUAL VISIT        [facility]      Name and Date of Birth:  Ibrahima Adams 49 y.o. 1975 MRN: [id number]    Psychiatric Virtual Visit:    Verification of patient location: Patient is located in the state that I hold an active license: [place]    REQUIRED DOCUMENTATION:     Provider located at [facility] at [address] [place] [address].  TeleMed provider: Jimmie Hung MD  Patient was identified by name and date of birth. Ibrahima Adams was informed that this is a telemedicine visit and that the visit is being conducted through the Epic Embedded platform. He agrees to proceed..  My office door was closed. No one else was in the room.  He acknowledged consent and understanding of privacy and security of the video platform. The patient has agreed to participate and understands they can discontinue the visit at any time. Patient is aware this is a billable service.         This note was shared with patient.    I spent 15 minutes directly with the patient during this visit        Review Of Systems:     Mood Euthymic   Thought Content Normal   General As HPI   Physical symptoms No physical complaints       Laboratory Results: No results found for this or any previous visit.    Subjective:  The patient was seen for continuing care and pharmacotherapy.  He reports improvement of his mood and also he has been more motivated to do things on his own without depending on his wife.  He is planning to return to work.  Sleep and appetite are adequate.  He has not had any of the severe episodes consistent with Tourette's in the past month and has had milder ones.        Objective:     Still symptomatic but improving  Mental status:  Appearance calm and cooperative  and good eye contact    Mood Euthymic   Affect affect appropriate    Speech Normal rate and Normal volume   Thought Processes  coherent/organized   Hallucinations Denies hallucinations and does not appear to be responding to internal stimuli   Thought Content Does not verbalize delusional material   Abnormal Thoughts no suicidal thoughts    Orientation A+O x 3       Assessment/Plan:       Diagnoses and all orders for this visit:    Tourette syndrome  -     risperiDONE (RisperDAL) 2 mg tablet; 1 tablet in the morning  -     risperiDONE (RisperDAL) 3 mg tablet; Take 1 tablet (3 mg total) by mouth daily at bedtime    Generalized anxiety disorder  -     busPIRone (BUSPAR) 15 mg tablet; Take 1 tablet (15 mg total) by mouth 2 (two) times a day  -     sertraline (ZOLOFT) 100 mg tablet; Take 1 tablet (100 mg total) by mouth every morning  -     traZODone (DESYREL) 100 mg tablet; Take 1 tablet (100 mg total) by mouth daily at bedtime    Depressive disorder  -     sertraline (ZOLOFT) 100 mg tablet; Take 1 tablet (100 mg total) by mouth every morning  -     traZODone (DESYREL) 100 mg tablet; Take 1 tablet (100 mg total) by mouth daily at bedtime        1. Tourette syndrome    2. Generalized anxiety disorder    3. Depressive disorder        Treatment Recommendations- Risks Benefits    We will increase risperidone to a total of 5 mg daily.  Continue with buspirone and sertraline and trazodone the same.  Follow-up in a few weeks  Risks, Benefits And Possible Side Effects Of Medications:  Risks, benefits, and possible side effects of medications explained to patient and patient verbalizes understanding    VIRTUAL VISIT DISCLAIMER    Ibrahima Adams verbally agrees to participate in Virtual Care Services. Pt is aware that Virtual Care Services could be limited without vital signs or the ability to perform a full hands-on physical exam. Ibrahima Christelclifford understands he or the provider may request at any time to terminate the video visit and request the patient to seek care or treatment in person.     Jimmie Hung MD 04/14/25                   Administrative  Statements   Administrative Statements   I have spent a total time of 25 minutes in caring for this patient on the day of the visit/encounter including Risks and benefits of tx options, Instructions for management, Patient and family education, Importance of tx compliance, Risk factor reductions, and Impressions.    Visit Time  Visit Start Time: 10:05 am  Visit Stop Time: 10:30 am  Total Visit Duration:  25 minutes    Jimmie Hung MD 04/14/25

## 2025-04-24 ENCOUNTER — APPOINTMENT (EMERGENCY)
Dept: CT IMAGING | Facility: HOSPITAL | Age: 50
End: 2025-04-24
Payer: COMMERCIAL

## 2025-04-24 ENCOUNTER — HOSPITAL ENCOUNTER (EMERGENCY)
Facility: HOSPITAL | Age: 50
Discharge: HOME | End: 2025-04-24
Attending: EMERGENCY MEDICINE
Payer: COMMERCIAL

## 2025-04-24 VITALS
SYSTOLIC BLOOD PRESSURE: 110 MMHG | DIASTOLIC BLOOD PRESSURE: 65 MMHG | HEART RATE: 91 BPM | OXYGEN SATURATION: 96 % | RESPIRATION RATE: 18 BRPM | TEMPERATURE: 98.4 F

## 2025-04-24 DIAGNOSIS — F12.90 CANNABIS USE DISORDER: ICD-10-CM

## 2025-04-24 DIAGNOSIS — R55 SYNCOPE AND COLLAPSE: Primary | ICD-10-CM

## 2025-04-24 LAB
ALBUMIN SERPL BCG-MCNC: 4.6 G/DL (ref 3.5–5)
ALP SERPL-CCNC: 98 U/L (ref 34–104)
ALT SERPL W P-5'-P-CCNC: 36 U/L (ref 7–52)
AMPHETAMINES SERPL QL SCN: NEGATIVE
ANION GAP SERPL CALCULATED.3IONS-SCNC: 15 MMOL/L (ref 4–13)
AST SERPL W P-5'-P-CCNC: 29 U/L (ref 13–39)
BARBITURATES UR QL: NEGATIVE
BASOPHILS # BLD AUTO: 0.11 THOUSANDS/ÂΜL (ref 0–0.1)
BASOPHILS NFR BLD AUTO: 1 % (ref 0–1)
BENZODIAZ UR QL: NEGATIVE
BILIRUB SERPL-MCNC: 0.4 MG/DL (ref 0.2–1)
BILIRUB UR QL STRIP: NEGATIVE
BUN SERPL-MCNC: 19 MG/DL (ref 5–25)
CALCIUM SERPL-MCNC: 8.9 MG/DL (ref 8.4–10.2)
CARDIAC TROPONIN I PNL SERPL HS: 4 NG/L (ref ?–50)
CHLORIDE SERPL-SCNC: 100 MMOL/L (ref 96–108)
CLARITY UR: CLEAR
CO2 SERPL-SCNC: 23 MMOL/L (ref 21–32)
COCAINE UR QL: NEGATIVE
COLOR UR: ABNORMAL
CREAT SERPL-MCNC: 1.43 MG/DL (ref 0.6–1.3)
CREAT UR-MCNC: 2.3 MG/DL
EOSINOPHIL # BLD AUTO: 0.25 THOUSAND/ÂΜL (ref 0–0.61)
EOSINOPHIL NFR BLD AUTO: 3 % (ref 0–6)
ERYTHROCYTE [DISTWIDTH] IN BLOOD BY AUTOMATED COUNT: 12.9 % (ref 11.6–15.1)
ETHANOL SERPL-MCNC: <10 MG/DL
FENTANYL UR QL SCN: NEGATIVE
GFR SERPL CREATININE-BSD FRML MDRD: 57 ML/MIN/1.73SQ M
GLUCOSE SERPL-MCNC: 264 MG/DL (ref 65–140)
GLUCOSE SERPL-MCNC: 273 MG/DL (ref 65–140)
GLUCOSE UR STRIP-MCNC: NEGATIVE MG/DL
HCT VFR BLD AUTO: 43.3 % (ref 36.5–49.3)
HGB BLD-MCNC: 14.3 G/DL (ref 12–17)
HGB UR QL STRIP.AUTO: NEGATIVE
HYDROCODONE UR QL SCN: NEGATIVE
IMM GRANULOCYTES # BLD AUTO: 0.11 THOUSAND/UL (ref 0–0.2)
IMM GRANULOCYTES NFR BLD AUTO: 1 % (ref 0–2)
KETONES UR STRIP-MCNC: NEGATIVE MG/DL
LEUKOCYTE ESTERASE UR QL STRIP: NEGATIVE
LYMPHOCYTES # BLD AUTO: 2.57 THOUSANDS/ÂΜL (ref 0.6–4.47)
LYMPHOCYTES NFR BLD AUTO: 29 % (ref 14–44)
MAGNESIUM SERPL-MCNC: 2 MG/DL (ref 1.9–2.7)
MCH RBC QN AUTO: 30.6 PG (ref 26.8–34.3)
MCHC RBC AUTO-ENTMCNC: 33 G/DL (ref 31.4–37.4)
MCV RBC AUTO: 93 FL (ref 82–98)
METHADONE UR QL: NEGATIVE
MONOCYTES # BLD AUTO: 0.6 THOUSAND/ÂΜL (ref 0.17–1.22)
MONOCYTES NFR BLD AUTO: 7 % (ref 4–12)
NEUTROPHILS # BLD AUTO: 5.19 THOUSANDS/ÂΜL (ref 1.85–7.62)
NEUTS SEG NFR BLD AUTO: 59 % (ref 43–75)
NITRITE UR QL STRIP: NEGATIVE
NRBC BLD AUTO-RTO: 0 /100 WBCS
OPIATES UR QL SCN: NEGATIVE
OXYCODONE+OXYMORPHONE UR QL SCN: NEGATIVE
PCP UR QL: NEGATIVE
PH UR STRIP.AUTO: 6 [PH]
PLATELET # BLD AUTO: 207 THOUSANDS/UL (ref 149–390)
PMV BLD AUTO: 9.6 FL (ref 8.9–12.7)
POTASSIUM SERPL-SCNC: 3.5 MMOL/L (ref 3.5–5.3)
PROT SERPL-MCNC: 7.1 G/DL (ref 6.4–8.4)
PROT UR STRIP-MCNC: NEGATIVE MG/DL
RBC # BLD AUTO: 4.67 MILLION/UL (ref 3.88–5.62)
SODIUM SERPL-SCNC: 138 MMOL/L (ref 135–147)
SP GR UR STRIP.AUTO: <=1.005 (ref 1–1.03)
THC UR QL: POSITIVE
TSH SERPL DL<=0.05 MIU/L-ACNC: 2.9 UIU/ML (ref 0.45–4.5)
UROBILINOGEN UR QL STRIP.AUTO: 0.2 E.U./DL
WBC # BLD AUTO: 8.83 THOUSAND/UL (ref 4.31–10.16)

## 2025-04-24 PROCEDURE — 96374 THER/PROPH/DIAG INJ IV PUSH: CPT

## 2025-04-24 PROCEDURE — 70450 CT HEAD/BRAIN W/O DYE: CPT

## 2025-04-24 PROCEDURE — 82570 ASSAY OF URINE CREATININE: CPT | Performed by: EMERGENCY MEDICINE

## 2025-04-24 PROCEDURE — 82948 REAGENT STRIP/BLOOD GLUCOSE: CPT

## 2025-04-24 PROCEDURE — 84484 ASSAY OF TROPONIN QUANT: CPT | Performed by: EMERGENCY MEDICINE

## 2025-04-24 PROCEDURE — 82077 ASSAY SPEC XCP UR&BREATH IA: CPT | Performed by: EMERGENCY MEDICINE

## 2025-04-24 PROCEDURE — 93005 ELECTROCARDIOGRAM TRACING: CPT

## 2025-04-24 PROCEDURE — 36415 COLL VENOUS BLD VENIPUNCTURE: CPT | Performed by: EMERGENCY MEDICINE

## 2025-04-24 PROCEDURE — 80053 COMPREHEN METABOLIC PANEL: CPT | Performed by: EMERGENCY MEDICINE

## 2025-04-24 PROCEDURE — 81003 URINALYSIS AUTO W/O SCOPE: CPT | Performed by: EMERGENCY MEDICINE

## 2025-04-24 PROCEDURE — 80307 DRUG TEST PRSMV CHEM ANLYZR: CPT | Performed by: EMERGENCY MEDICINE

## 2025-04-24 PROCEDURE — 96360 HYDRATION IV INFUSION INIT: CPT

## 2025-04-24 PROCEDURE — 83735 ASSAY OF MAGNESIUM: CPT | Performed by: EMERGENCY MEDICINE

## 2025-04-24 PROCEDURE — 99285 EMERGENCY DEPT VISIT HI MDM: CPT

## 2025-04-24 PROCEDURE — 84443 ASSAY THYROID STIM HORMONE: CPT | Performed by: EMERGENCY MEDICINE

## 2025-04-24 PROCEDURE — 99285 EMERGENCY DEPT VISIT HI MDM: CPT | Performed by: EMERGENCY MEDICINE

## 2025-04-24 PROCEDURE — 96361 HYDRATE IV INFUSION ADD-ON: CPT

## 2025-04-24 PROCEDURE — 70486 CT MAXILLOFACIAL W/O DYE: CPT

## 2025-04-24 PROCEDURE — 85025 COMPLETE CBC W/AUTO DIFF WBC: CPT | Performed by: EMERGENCY MEDICINE

## 2025-04-24 RX ORDER — NALOXONE HYDROCHLORIDE 1 MG/ML
2 INJECTION PARENTERAL ONCE
Status: COMPLETED | OUTPATIENT
Start: 2025-04-24 | End: 2025-04-24

## 2025-04-24 RX ORDER — ONDANSETRON 2 MG/ML
4 INJECTION INTRAMUSCULAR; INTRAVENOUS ONCE
Status: COMPLETED | OUTPATIENT
Start: 2025-04-24 | End: 2025-04-24

## 2025-04-24 RX ADMIN — SODIUM CHLORIDE 1000 ML: 0.9 INJECTION, SOLUTION INTRAVENOUS at 13:18

## 2025-04-24 RX ADMIN — ONDANSETRON 4 MG: 2 INJECTION INTRAMUSCULAR; INTRAVENOUS at 13:50

## 2025-04-24 NOTE — ED PROVIDER NOTES
Time reflects when diagnosis was documented in both MDM as applicable and the Disposition within this note       Time User Action Codes Description Comment    4/24/2025  4:32 PM Lam Guerrero [R55] Syncope and collapse     4/24/2025  4:32 PM Lam Guerrero [F12.90] Cannabis use disorder           ED Disposition       ED Disposition   Discharge    Condition   Stable    Date/Time   Thu Apr 24, 2025  4:32 PM    Comment   Ibrahima Adams discharge to home/self care.                   Assessment & Plan       Medical Decision Making  49-year-old male presents with 1 day of a syncopal event with a prolonged period of unconsciousness, he woke up after administration of Narcan, but was reported to have gone into SVT and then V. tach, will be evaluated for possible electrolyte abnormality, toxicologic event including heroin or fentanyl use, or other things that could cause altered mental status such as cardiac arrhythmia, thyroid dysfunction, although my suspicion for these is lower as I do not believe that he would have woken up so readily if these things were occurring.    The patient appears reluctant to give us a urine sample, he finally noted that he could go to the bathroom and give us a sample, when it came out the urine sample was completely clear and cold, I have strong suspicion that the patient water down his urine sample with tap water to intentionally obscure accurate results.    The patient's urine results look deluded with the low creatinine and likely are inaccurate.  However the patient has had no arrhythmias while in my care, has been resting comfortably and has no complaints, appears safe for discharge.  Although the patient adamantly denies opiate use, with his history of opiate use, and his immediate recovery after administration of Narcan and pinpoint pupils prior to the administration in the respiratory depression, all signs still point to an opiate overdose.  I do not have hard proof  of this though and therefore I am reluctant to formally diagnose him with an opiate overdose.  He will be encouraged to follow-up with primary care and return if any symptoms worsen or new symptoms occur.    Disposition: Patient stable for outpatient management. Discussed need for follow up with their primary doctor or specialist to review all results, including incidental findings as below. Patient discharged with explanation of ED workup and diagnosis, instructions on how to obtain outpatient follow up, care instructions at home, and strict return precautions if patient develops new or worsening symptoms. Patients questions answered and agreeable with discharge plan.        PLEASE NOTE:  This encounter was completed utilizing the [employer] Direct Speech Voice Recognition Software. Grammatical errors, random word insertions, pronoun errors and incomplete sentences are occasional inherent consequences of the system due to software limitations, ambient noise and hardware issues.These may be missed by proof reading prior to affixing electronic signature. Any questions or concerns about the content, text or information contained within the body of this dictation should be directly addressed to the physician for clarification. Please do not hesitate to call me directly if you have any questions or concerns.      Amount and/or Complexity of Data Reviewed  Labs: ordered.  Radiology: ordered.    Risk  Prescription drug management.             Medications   naloxone (FOR EMS ONLY) (NARCAN) 2 MG/2ML injection 4 mg (0 mg Does not apply Given to EMS 4/24/25 1303)   sodium chloride 0.9 % bolus 1,000 mL (0 mL Intravenous Stopped 4/24/25 1519)   ondansetron (ZOFRAN) injection 4 mg (4 mg Intravenous Given 4/24/25 1350)       ED Risk Strat Scores                    No data recorded        SBIRT 20yo+      Flowsheet Row Most Recent Value   Initial Alcohol Screen: US AUDIT-C     1. How often do you have a drink containing  alcohol? 0 Filed at: 04/24/2025 1307   2. How many drinks containing alcohol do you have on a typical day you are drinking?  0 Filed at: 04/24/2025 1307   3a. Male UNDER 65: How often do you have five or more drinks on one occasion? 0 Filed at: 04/24/2025 1307   3b. FEMALE Any Age, or MALE 65+: How often do you have 4 or more drinks on one occassion? 0 Filed at: 04/24/2025 1307   Audit-C Score 0 Filed at: 04/24/2025 1307                            History of Present Illness       Chief Complaint   Patient presents with    Medical Problem       Past Medical History:   Diagnosis Date    Chronic back pain       Past Surgical History:   Procedure Laterality Date    WISDOM TOOTH EXTRACTION        Family History   Problem Relation Age of Onset    Cirrhosis Mother     Cholelithiasis Mother     Diabetes type II Father     Stroke Maternal Grandfather     Diabetes Paternal Grandfather       Social History     Tobacco Use    Smoking status: Former     Types: Cigars    Smokeless tobacco: Never    Tobacco comments:     has a cigar about 2/month   Vaping Use    Vaping status: Never Used   Substance Use Topics    Alcohol use: Yes     Alcohol/week: 2.0 standard drinks of alcohol     Types: 1 Glasses of wine, 1 Cans of beer per week     Comment: 3 drinks/week    Drug use: Not Currently     Types: Marijuana, Cocaine, Oxycodone     Comment: snorted cocain about 10 years ago for 1 year      E-Cigarette/Vaping    E-Cigarette Use Never User       E-Cigarette/Vaping Substances    Nicotine No     THC No     CBD No     Flavoring No     Other No     Unknown No       I have reviewed and agree with the history as documented.     49-year-old male presents after he was found in line at a restaurant, they report that he was waiting for his food and he suddenly slumped down to the floor, and was unconscious and unresponsive for an extended period of time.  EMS found him still unconscious and unresponsive, with pinpoint pupils, respiratory rate of  8, and they gave him 4 mg of Narcan intranasally, he woke up, became tachycardic and the EMS crew reports that the patient was in SVT and then had a run of V. tach.  The patient at this time has no complaints, including no dizziness prior to the period of unconsciousness, no nausea, vomiting, chest pain, cough, shortness of breath, headaches, abdominal pain, no other complaints.  The patient reports he used to drugs a long time ago but he has not done drugs in many years, drinks occasionally, does not smoke, has a history of nonepileptic seizures, chart review shows that he has a history of recent fentanyl abuse, as well as psychogenic nonepileptic form seizures, he is well-known to the department and has a history of alcohol use disorder as well.  EMS also noted that they put in a supraglottic airway and when he woke up he pulled the supraglottic airway back out.        Review of Systems   Constitutional:  Negative for fever.   Respiratory:  Negative for cough and shortness of breath.    Cardiovascular:  Negative for chest pain.   Gastrointestinal:  Negative for abdominal pain, diarrhea, nausea and vomiting.   Genitourinary:  Negative for dysuria and hematuria.   Neurological:  Negative for light-headedness and headaches.           Objective       ED Triage Vitals [04/24/25 1302]   Temperature Pulse Blood Pressure Respirations SpO2 Patient Position - Orthostatic VS   98.4 °F (36.9 °C) (!) 113 160/100 (!) 11 99 % Lying      Temp Source Heart Rate Source BP Location FiO2 (%) Pain Score    Oral Monitor Left arm -- --      Vitals      Date and Time Temp Pulse SpO2 Resp BP Pain Score FACES Pain Rating User   04/24/25 1656 -- 91 96 % 18 110/65 -- -- SR   04/24/25 1430 -- 95 95 % 18 109/60 -- -- SR   04/24/25 1400 -- 97 93 % 18 140/67 -- -- SR   04/24/25 1302 98.4 °F (36.9 °C) 113 99 % 11 160/100 -- -- DB            Physical Exam  Vitals and nursing note reviewed.   Constitutional:       General: He is not in acute  distress.     Appearance: Normal appearance.   HENT:      Head: Normocephalic.      Comments: Face shows bilateral linear contusions along the nasolabial folds, nontender to palpation     Right Ear: External ear normal.      Left Ear: External ear normal.      Mouth/Throat:      Mouth: Mucous membranes are moist.      Pharynx: Oropharynx is clear.   Eyes:      Conjunctiva/sclera: Conjunctivae normal.      Pupils: Pupils are equal, round, and reactive to light.   Cardiovascular:      Rate and Rhythm: Normal rate and regular rhythm.   Pulmonary:      Effort: Pulmonary effort is normal. No respiratory distress.   Abdominal:      General: Abdomen is flat. There is no distension.   Musculoskeletal:         General: No deformity. Normal range of motion.      Cervical back: Normal range of motion.   Skin:     General: Skin is warm and dry.   Neurological:      General: No focal deficit present.      Mental Status: He is alert and oriented to person, place, and time.      Cranial Nerves: No cranial nerve deficit.      Sensory: No sensory deficit.      Motor: No weakness.      Coordination: Coordination normal.   Psychiatric:         Mood and Affect: Mood normal.         Behavior: Behavior normal.         Results Reviewed       Procedure Component Value Units Date/Time    Rapid drug screen, urine [[id number]]  (Abnormal) Collected: 04/24/25 1549    Lab Status: Final result Specimen: Urine, Other Updated: 04/24/25 1631     Amph/Meth UR Negative     Barbiturate Ur Negative     Benzodiazepine Urine Negative     Cocaine Urine Negative     Methadone Urine Negative     Opiate Urine Negative     PCP Ur Negative     THC Urine Positive     Oxycodone Urine Negative     Fentanyl Urine Negative     HYDROCODONE URINE Negative    Narrative:      Presumptive report. If requested, specimen will be sent to reference lab for confirmation.  FOR MEDICAL PURPOSES ONLY.   IF CONFIRMATION NEEDED PLEASE CONTACT THE LAB WITHIN 5 DAYS.    Drug Screen  Cutoff Levels:  AMPHETAMINE/METHAMPHETAMINES  1000 ng/mL  BARBITURATES     200 ng/mL  BENZODIAZEPINES     200 ng/mL  COCAINE      300 ng/mL  METHADONE      300 ng/mL  OPIATES      300 ng/mL  PHENCYCLIDINE     25 ng/mL  THC       50 ng/mL  OXYCODONE      100 ng/mL  FENTANYL      5 ng/mL  HYDROCODONE     300 ng/mL    Creatinine, urine, random [[id number]] Collected: 04/24/25 1551    Lab Status: Final result Specimen: Urine, Other Updated: 04/24/25 1629     Creatinine, Ur 2.3 mg/dL     UA (URINE) with reflex to Scope [[id number]]  (Abnormal) Collected: 04/24/25 1549    Lab Status: Final result Specimen: Urine, Other Updated: 04/24/25 1612     Color, UA Straw     Clarity, UA Clear     Specific Gravity, UA <=1.005     pH, UA 6.0     Leukocytes, UA Negative     Nitrite, UA Negative     Protein, UA Negative mg/dl      Glucose, UA Negative mg/dl      Ketones, UA Negative mg/dl      Urobilinogen, UA 0.2 E.U./dl      Bilirubin, UA Negative     Occult Blood, UA Negative    TSH [[id number]]  (Normal) Collected: 04/24/25 1320    Lab Status: Final result Specimen: Blood from Arm, Left Updated: 04/24/25 1357     TSH 3RD GENERATON 2.901 uIU/mL     HS Troponin 0hr (reflex protocol) [[id number]]  (Normal) Collected: 04/24/25 1320    Lab Status: Final result Specimen: Blood from Arm, Left Updated: 04/24/25 1349     hs TnI 0hr 4 ng/L     Comprehensive metabolic panel [[id number]]  (Abnormal) Collected: 04/24/25 1320    Lab Status: Final result Specimen: Blood from Arm, Left Updated: 04/24/25 1343     Sodium 138 mmol/L      Potassium 3.5 mmol/L      Chloride 100 mmol/L      CO2 23 mmol/L      ANION GAP 15 mmol/L      BUN 19 mg/dL      Creatinine 1.43 mg/dL      Glucose 273 mg/dL      Calcium 8.9 mg/dL      AST 29 U/L      ALT 36 U/L      Alkaline Phosphatase 98 U/L      Total Protein 7.1 g/dL      Albumin 4.6 g/dL      Total Bilirubin 0.40 mg/dL      eGFR 57 ml/min/1.73sq m     Narrative:      National Kid[employer] Foundation guidelines  for Chronic Kidney Disease (CKD):     Stage 1 with normal or high GFR (GFR > 90 mL/min/1.73 square meters)    Stage 2 Mild CKD (GFR = 60-89 mL/min/1.73 square meters)    Stage 3A Moderate CKD (GFR = 45-59 mL/min/1.73 square meters)    Stage 3B Moderate CKD (GFR = 30-44 mL/min/1.73 square meters)    Stage 4 Severe CKD (GFR = 15-29 mL/min/1.73 square meters)    Stage 5 End Stage CKD (GFR <15 mL/min/1.73 square meters)  Note: GFR calculation is accurate only with a steady state creatinine    Magnesium [[id number]]  (Normal) Collected: 04/24/25 1320    Lab Status: Final result Specimen: Blood from Arm, Left Updated: 04/24/25 1343     Magnesium 2.0 mg/dL     Ethanol [[id number]]  (Normal) Collected: 04/24/25 1320    Lab Status: Final result Specimen: Blood from Arm, Left Updated: 04/24/25 1341     Ethanol Lvl <10 mg/dL     CBC and differential [[id number]]  (Abnormal) Collected: 04/24/25 1320    Lab Status: Final result Specimen: Blood from Arm, Left Updated: 04/24/25 1326     WBC 8.83 Thousand/uL      RBC 4.67 Million/uL      Hemoglobin 14.3 g/dL      Hematocrit 43.3 %      MCV 93 fL      MCH 30.6 pg      MCHC 33.0 g/dL      RDW 12.9 %      MPV 9.6 fL      Platelets 207 Thousands/uL      nRBC 0 /100 WBCs      Segmented % 59 %      Immature Grans % 1 %      Lymphocytes % 29 %      Monocytes % 7 %      Eosinophils Relative 3 %      Basophils Relative 1 %      Absolute Neutrophils 5.19 Thousands/µL      Absolute Immature Grans 0.11 Thousand/uL      Absolute Lymphocytes 2.57 Thousands/µL      Absolute Monocytes 0.60 Thousand/µL      Eosinophils Absolute 0.25 Thousand/µL      Basophils Absolute 0.11 Thousands/µL     Fingerstick Glucose (POCT) [[id number]]  (Abnormal) Collected: 04/24/25 1303    Lab Status: Final result Specimen: Blood Updated: 04/24/25 1304     POC Glucose 264 mg/dl             CT head without contrast   Final Interpretation by Willie Ji MD (04/24 1167)      1.  No acute intracranial abnormality.   2.  No  acute facial bone fracture                  Workstation performed: [id number]         CT facial bones without contrast   Final Interpretation by Willie Ji MD ( [id number])      1.  No acute intracranial abnormality.   2.  No acute facial bone fracture                  Workstation performed: [id number]             ECG 12 Lead Documentation Only    Date/Time: 2025 1:11 PM    Performed by: Lam Guerrero MD  Authorized by: Lam Guerrero MD    ECG reviewed by me, the ED Provider: yes    Patient location:  ED  Previous ECG:     Previous ECG:  Compared to current    Similarity:  No change  Interpretation:     Interpretation: abnormal    Rate:     ECG rate:  115  Rhythm:     Rhythm: sinus tachycardia    Ectopy:     Ectopy: none    QRS:     QRS axis:  Normal    QRS intervals:  Normal  Conduction:     Conduction: normal    ST segments:     ST segments:  Normal  T waves:     T waves: normal        ED Medication and Procedure Management   Prior to Admission Medications   Prescriptions Last Dose Informant Patient Reported? Taking?   busPIRone (BUSPAR) 15 mg tablet   No No   Sig: Take 1 tablet (15 mg total) by mouth 2 (two) times a day   risperiDONE (RisperDAL) 2 mg tablet   No No   Si tablet in the morning   risperiDONE (RisperDAL) 3 mg tablet   No No   Sig: Take 1 tablet (3 mg total) by mouth daily at bedtime   sertraline (ZOLOFT) 100 mg tablet   No No   Sig: Take 1 tablet (100 mg total) by mouth every morning   traZODone (DESYREL) 100 mg tablet   No No   Sig: Take 1 tablet (100 mg total) by mouth daily at bedtime      Facility-Administered Medications: None     Discharge Medication List as of 2025  4:38 PM        CONTINUE these medications which have NOT CHANGED    Details   busPIRone (BUSPAR) 15 mg tablet Take 1 tablet (15 mg total) by mouth 2 (two) times a day, Starting Mon 2025, Normal      !! risperiDONE (RisperDAL) 2 mg tablet 1 tablet in the morning, Normal      !! risperiDONE  (RisperDAL) 3 mg tablet Take 1 tablet (3 mg total) by mouth daily at bedtime, Starting Mon 4/14/2025, Normal      sertraline (ZOLOFT) 100 mg tablet Take 1 tablet (100 mg total) by mouth every morning, Starting Mon 4/14/2025, Normal      traZODone (DESYREL) 100 mg tablet Take 1 tablet (100 mg total) by mouth daily at bedtime, Starting Mon 4/14/2025, Normal       !! - Potential duplicate medications found. Please discuss with provider.        No discharge procedures on file.  ED SEPSIS DOCUMENTATION   Time reflects when diagnosis was documented in both MDM as applicable and the Disposition within this note       Time User Action Codes Description Comment    4/24/2025  4:32 PM Lam Guerrero [R55] Syncope and collapse     4/24/2025  4:32 PM Lam Guerrero [F12.90] Cannabis use disorder                  Lam Guerrero MD  04/25/25 1789

## 2025-04-25 LAB
ATRIAL RATE: 114 BPM
ATRIAL RATE: 115 BPM
P AXIS: 47 DEGREES
P AXIS: 65 DEGREES
PR INTERVAL: 162 MS
PR INTERVAL: 168 MS
QRS AXIS: 44 DEGREES
QRS AXIS: 59 DEGREES
QRSD INTERVAL: 96 MS
QRSD INTERVAL: 96 MS
QT INTERVAL: 322 MS
QT INTERVAL: 322 MS
QTC INTERVAL: 443 MS
QTC INTERVAL: 445 MS
T WAVE AXIS: 25 DEGREES
T WAVE AXIS: 26 DEGREES
VENTRICULAR RATE: 114 BPM
VENTRICULAR RATE: 115 BPM

## 2025-04-25 PROCEDURE — 93010 ELECTROCARDIOGRAM REPORT: CPT | Performed by: INTERNAL MEDICINE

## 2025-05-19 ENCOUNTER — TELEPHONE (OUTPATIENT)
Age: 50
End: 2025-05-19

## 2025-05-19 NOTE — TELEPHONE ENCOUNTER
Patient contacted the office to schedule a follow up visit with provider. Patient is now scheduled for 7/21/25  at 9:30a virtually.

## 2025-05-20 DIAGNOSIS — F41.1 GENERALIZED ANXIETY DISORDER: ICD-10-CM

## 2025-05-20 RX ORDER — BUSPIRONE HYDROCHLORIDE 15 MG/1
15 TABLET ORAL 2 TIMES DAILY
Qty: 60 TABLET | Refills: 0 | Status: SHIPPED | OUTPATIENT
Start: 2025-05-20

## 2025-06-04 ENCOUNTER — TELEPHONE (OUTPATIENT)
Age: 50
End: 2025-06-04

## 2025-06-04 DIAGNOSIS — F95.2 TOURETTE SYNDROME: Primary | ICD-10-CM

## 2025-06-04 RX ORDER — RISPERIDONE 4 MG/1
4 TABLET ORAL 2 TIMES DAILY
Qty: 30 TABLET | Refills: 1 | Status: SHIPPED | OUTPATIENT
Start: 2025-06-04

## 2025-06-04 NOTE — TELEPHONE ENCOUNTER
There is no availability for provider. All PT's that are scheduled in the next week have had their appts since 02/2025.

## 2025-06-04 NOTE — TELEPHONE ENCOUNTER
"Nurse spoke with Morena (wife) #[phone] - spoke for 30 minutes  Please call Morena at # above.     Wife states over the past 2 days/nights, Jb has been constantly yelling, screaming, clapping hands, shouting profanities, has not slept in 2 days, has a few minutes of being able to control self but, it does not last. Jb has been trying grounding / calming techniques and none are working.     Unable to control behavior - behavior is very typical of Jb's Tourette's    Apartment complex has informed Jb and wife this morning that they will be evicted if behavior is not controlled. Other tenants are complaining and some have moved, as a result.         It is \"urgent for Jb to be calmed down, we cannot lose our apartment.\"    Wife is looking into Jb going to spend a few days with his father.   \"We need a solution.\"    Wife called [facility] last night. Police and paramedics came to the apartment -  Jb was able to talk with them and was told he did not need to go to the ED.       Trazodone helps at night with sleep but does not help when these episodes are happening.       Asking if a new antipsychotic is needed?   Per Morena: \"Buspar and antidepressant is good - I think his antipsychotic needs to be changed.\"     Please advise.         "

## 2025-06-04 NOTE — PROGRESS NOTES
Spoke with his wife .Since Monday night he has had a few episodes of screaming and pounding on his chest and cursing,with profanities.Patient was able to talk to me briefly.  He was able to compose himself.  While I was talking to his wife he could be heard in the background with cursing and screaming.  At this time, we will increase risperidone to 4 mg twice daily.  He will call in a few days with an update and adjustments or revisions in the treatment plan will be made at that time

## 2025-06-04 NOTE — TELEPHONE ENCOUNTER
Patients wife called in to let provider know that patients Tourettes condition  is worsening and patient is shouting profanity and random words and hitting his chest over and over all day and night.     Wife also shared that they live in a apt complex that has thin walls and neighbors are starting to complaint to the point that they received a letter from the management office stating they could be evicted due to neighbors complaints.     Wife would like to know if patient would need to get his medication adjusted and if patient needs to be seen sooner.    Patient is currently scheduled for mm appt on 7/21 @9:30am.     jail not confirmed       Call back [phone] Morena

## 2025-06-06 ENCOUNTER — TELEPHONE (OUTPATIENT)
Dept: PSYCHIATRY | Facility: CLINIC | Age: 50
End: 2025-06-06

## 2025-06-06 NOTE — TELEPHONE ENCOUNTER
Letter sent via [employer] and to address on file to inform patient that current medication management provider will be transitioning to an all virtual platform. All future appointments will be scheduled and completed virtually. Office number provided to contact if patient prefers visits to be in office.    Virtual consent form complete.

## 2025-06-17 ENCOUNTER — HOSPITAL ENCOUNTER (EMERGENCY)
Facility: HOSPITAL | Age: 50
Discharge: HOME | End: 2025-06-17
Attending: EMERGENCY MEDICINE
Payer: COMMERCIAL

## 2025-06-17 VITALS
SYSTOLIC BLOOD PRESSURE: 114 MMHG | DIASTOLIC BLOOD PRESSURE: 68 MMHG | HEART RATE: 98 BPM | TEMPERATURE: 97.6 F | OXYGEN SATURATION: 97 % | RESPIRATION RATE: 20 BRPM

## 2025-06-17 DIAGNOSIS — F41.9 ANXIETY: Primary | ICD-10-CM

## 2025-06-17 DIAGNOSIS — Z00.8 ENCOUNTER FOR PSYCHOLOGICAL EVALUATION: ICD-10-CM

## 2025-06-17 LAB — ETHANOL EXG-MCNC: 0 MG/DL

## 2025-06-17 PROCEDURE — 82075 ASSAY OF BREATH ETHANOL: CPT | Performed by: EMERGENCY MEDICINE

## 2025-06-17 PROCEDURE — 99283 EMERGENCY DEPT VISIT LOW MDM: CPT

## 2025-06-17 PROCEDURE — 99284 EMERGENCY DEPT VISIT MOD MDM: CPT | Performed by: EMERGENCY MEDICINE

## 2025-06-18 ENCOUNTER — TELEPHONE (OUTPATIENT)
Dept: PSYCHOLOGY | Facility: CLINIC | Age: 50
End: 2025-06-18

## 2025-06-18 NOTE — TELEPHONE ENCOUNTER
Called pt and lvm asking to return my call to discuss PHP referral.     SUBJECTIVE  Solomon Moore is a 65 year old male who presents today complaining of persistent right-sided earache with head congestion. Patient was seen by me 2 weeks ago he was started on Zithromax he felt better slightly but his symptoms came back. He denies smoking drinking or street drug use. No other concerns.    Current Outpatient Medications   Medication Sig   • cefdinir (OMNICEF) 300 MG capsule Take 1 capsule by mouth 2 times daily.   • naproxen (NAPROSYN) 500 MG tablet Take 1 tablet by mouth 2 times daily.   • azithromycin (ZITHROMAX) 500 MG tablet Take 1 tablet by mouth daily.   • predniSONE (DELTASONE) 20 MG tablet 3 tabl po today, then 2 tabl po daily x 3 days, then 1 tabl po daily x 3 days     No current facility-administered medications for this visit.         Allergies as of 12/03/2019   • (No Known Allergies)       REVIEW OF SYSTEMS  Review of systems is negative for all other systems.    PAST SURGICAL HISTORY, SOCIAL HISTORY, FAMILY HISTORY, PAST MEDICAL HISTORY  Reviewed on file.     PHYSICAL EXAMINATION  General:  He is a well-nourished male, in no acute distress.  Blood pressure 116/60, pulse 72, temperature 98.8 °F (37.1 °C), temperature source Oral, height 5' 8\" (1.727 m), weight 70.3 kg, SpO2 99 %.  HEENT:  Pupils equal, reactive to light and accommodation.  Sclerae anicteric.  Nose swollen turbinates and clear discharge present, right tympanic membrane red and bulging left tympanic membrane with fluid behind.  Throat is clear.  Neck:  Supple.  No thyromegaly, no lymphadenopathy, no JVD, no carotid bruit.  Lungs:  Essentially clear to auscultation.  Coronary:  Normal regular heart rate and rhythm without murmur or arrhythmia.    Abdomen:  Benign.  No hepatosplenomegaly.  No mass, rebound, guarding or CVA tenderness.  Extremities:  No cyanosis, clubbing or edema.  Pulses strong, symmetrical in dorsalis pedis and posterior tibialis.  Sensory to light touch, position and vibration is  intact.  Neurologic:  Finger-to-nose, Romberg test normal.  Cranial nerves 2-12 intact.     LABS  Lab Services on 09/10/2019   Component Date Value Ref Range Status   • WBC 09/10/2019 6.2  4.2 - 11.0 K/mcL Final   • RBC 09/10/2019 5.53  4.50 - 5.90 mil/mcL Final   • HGB 09/10/2019 16.3  13.0 - 17.0 g/dL Final   • HCT 09/10/2019 47.2  39.0 - 51.0 % Final   • MCV 09/10/2019 85.4  78.0 - 100.0 fl Final   • MCH 09/10/2019 29.5  26.0 - 34.0 pg Final   • MCHC 09/10/2019 34.5  32.0 - 36.5 g/dL Final   • RDW-CV 09/10/2019 13.1  11.0 - 15.0 % Final   • PLT 09/10/2019 263  140 - 450 K/mcL Final   • DIFF TYPE 09/10/2019 AUTOMATED DIFFERENTIAL   Final   • Neutrophil 09/10/2019 66  % Final   • LYMPH 09/10/2019 23  % Final   • MONO 09/10/2019 7  % Final   • EOSIN 09/10/2019 3  % Final   • BASO 09/10/2019 1  % Final   • Absolute Neutrophil 09/10/2019 4.2  1.8 - 7.7 K/mcL Final   • Absolute Lymph 09/10/2019 1.4  1.0 - 4.0 K/mcL Final   • Absolute Mono 09/10/2019 0.4  0.3 - 0.9 K/mcL Final   • Absolute Eos 09/10/2019 0.2  0.1 - 0.5 K/mcL Final   • Absolute Baso 09/10/2019 0.0  0.0 - 0.3 K/mcL Final   • Fasting Status 09/10/2019 UNK  hrs Final   • Sodium 09/10/2019 137  135 - 145 mmol/L Final   • Potassium 09/10/2019 3.7  3.4 - 5.1 mmol/L Final   • Chloride 09/10/2019 101  98 - 107 mmol/L Final   • Carbon Dioxide 09/10/2019 24  21 - 32 mmol/L Final   • Anion Gap 09/10/2019 16  10 - 20 mmol/L Final   • Glucose 09/10/2019 138* 65 - 99 mg/dL Final   • BUN 09/10/2019 7  6 - 20 mg/dL Final   • Creatinine 09/10/2019 0.80  0.67 - 1.17 mg/dL Final   • GFR Estimate,  09/10/2019 >90   Final    eGFR results = or >90 mL/min/1.73m2 = Normal kidney function.   • GFR Estimate, Non  09/10/2019 >90   Final    eGFR results = or >90 mL/min/1.73m2 = Normal kidney function.   • BUN/Creatinine Ratio 09/10/2019 9  7 - 25 Final   • CALCIUM 09/10/2019 9.1  8.4 - 10.2 mg/dL Final   • TOTAL BILIRUBIN 09/10/2019 0.7  0.2 - 1.0  mg/dL Final   • AST/SGOT 09/10/2019 15  <38 Units/L Final   • ALT/SGPT 09/10/2019 26  <64 Units/L Final   • ALK PHOSPHATASE 09/10/2019 89  45 - 117 Units/L Final   • TOTAL PROTEIN 09/10/2019 7.5  6.4 - 8.2 g/dL Final   • Albumin 09/10/2019 3.7  3.6 - 5.1 g/dL Final   • GLOBULIN 09/10/2019 3.8  2.0 - 4.0 g/dL Final   • A/G Ratio, Serum 09/10/2019 1.0  1.0 - 2.4 Final   • LDH 09/10/2019 163  86 - 234 Units/L Final   • CEA 09/10/2019 1.5  0.0 - 5.0 ng/mL Final    [employer] Advia Caktusaur Chemiluminescence Immunoassay       ASSESSMENT AND PLAN  1. Non-recurrent acute suppurative otitis media without spontaneous rupture of tympanic membrane, unspecified laterality    2. Cough    3. Fever, unspecified fever cause        Orders Placed This Encounter   • cefdinir (OMNICEF) 300 MG capsule   • naproxen (NAPROSYN) 500 MG tablet       Otitis media instruction sheet given. Patient will be switched to Omnicef 300 mg twice a day for 10 days and naproxen 500 mg every 12 hours when necessary pain and fever prescribed. Follow-up with me in 2 weeks, sooner if any problem.

## 2025-06-18 NOTE — ED NOTES
[facility]ERRAL     [facility]    Name and Date of Birth:  Ibrahima Adams 50 y.o. 1975    Date of Referral: June 17, 2025    Referral Source (Agency/Name): [facility]    Correct Demographics on file:  Yes     Emergency contact on file: Yes     Insurance on file: Yes     _____________________________________________      Presenting Symptoms and Stressors:  Yelling, hitting self, loss of time    Please describe the reason for Referral: Anxiety Episodes    Stressors: financial problems and job loss    Symptoms:  worsening anxiety and for psychiatric medication management    Suicidal Ideation: None    Homicidal Ideation: None    Depressed Mood: Yes    Heavenly/Hypomania: None    Psychosis: None    Agitation: Yes    Appetite Changes: normal appetite    Sleep Disturbance: decreased sleep    Access to Weapons:  No    Smoking Status: denies use    Substance Use:  None  If Use : Last use N/A   If Use: Current SA treatment: None    Current Psychiatrist or Therapist:    Psychiatrist: Psychiatrist, name unknown  Therapist: Psychologist, name unknown    Past Psychiatric Treatment: yes    If currently Inpatient - Date of Anticipated discharge: N/A    Diagnoses:  Anxiety Disorder    Do they Require Ambulatory Assistance: No    Communication Assistance: not required     Legal Issues: None        Eunice Javed

## 2025-06-18 NOTE — ED NOTES
Discharge and Safety Plan    This writer discussed the patients current presentation and recommended discharge plan with   They agree with the patient being discharged at this time with referrals and/or information about PHP    The patient was Instructed to follow up with their Psychiatrist.   The patient was provided with referral information for:   [facility]     This writer and the patient completed a safety plan.      Discussion was held with patient's wife, regarding the process of completing a 302 petition in their county if necessary.     This writer discussed discharge plans with the patient and family who agrees with and understands the discharge plans.         SAFETY PLAN  Warning Signs (thoughts, images, mood, behavior, situations) of a potential crisis: starting to feel stress      Coping Skills (what can I do to take my mind off the problem, or to keep myself safe): Walk, Exercise      Outside Support (who can I reach out to for support and help): Psychiatrist, Psychologist, Follow through on PHP         National Suicide Prevention Hotline:  [address] [place] [address] [phone] - Crisis   [facility] [phone] - LVF Crisis/Mobile Crisis   [phone] - SLPF Crisis   [facility]: [phone]  [facility]: [phone]   [facility] [place] [phone] - Crisis   [facility] [phone] - Crisis     [facility] [phone] - Crisis   [facility] [phone] - Crisis   [phone] - Peer Support Talk Line (1-9pm daily)  [phone] - Teen Support Talk Line (1-9pm daily)  [phone] - [facility] [phone]- Crisis    [facility] [phone] - Crisis   [facility] [phone] - Crisis    [facility]) [phone] - [facility] [place] [facility]

## 2025-06-18 NOTE — ED NOTES
"Met with patient to complete crisis intake and safety risk assessments.  His wife, Morena, was at bedside.  Patient reported that he has \"Anxiety Episodes\" in which he yells repeatedly, hits himself or rubs his face until it is red.  Morena reported that he punches his chest and his legs, often leaving bruises.  She reported that the patient is dx with Functional Neurological DO.  Patient has a psychiatrist and a psychologist.  Episodes are reportedly sporadic but have been increasing to daily events over the last couple of weeks.  Patient reported that he is under stress over losing his job and bills.  He is currently not working because of the episodes by direction of his employer.  Date reported that they live in an apartment  and patient is very loud during the episodes. Neighbors are complaining and they are in jeopardy of being evicted per Morena.  Morena reported that during the episode patient does not seem to be aware of his surroundings and what is happening.  He has no sense of time and shouts random things over and over again.  Usually the patient curses.  Morena wanted patient to agree to IPU but the patient did not want it.  Per EDMD, there is no criteria for a 302.  [facility] offered PHP and the patient agreed to a referral.  "

## 2025-06-18 NOTE — ED NOTES
Belongings given back to patient. Patient and spouse had no further questions at this time.     Karime Anderson RN  06/17/25 2008

## 2025-06-19 ENCOUNTER — TELEPHONE (OUTPATIENT)
Dept: PSYCHOLOGY | Facility: CLINIC | Age: 50
End: 2025-06-19

## 2025-06-19 NOTE — TELEPHONE ENCOUNTER
Pt just returned my call today and he is going to email me the current insurance card copy and then we will be able to schedule him for PHP.

## 2025-06-21 DIAGNOSIS — F41.1 GENERALIZED ANXIETY DISORDER: ICD-10-CM

## 2025-06-23 RX ORDER — BUSPIRONE HYDROCHLORIDE 15 MG/1
15 TABLET ORAL 2 TIMES DAILY
Qty: 60 TABLET | Refills: 0 | Status: SHIPPED | OUTPATIENT
Start: 2025-06-23

## 2025-06-24 ENCOUNTER — HOSPITAL ENCOUNTER (EMERGENCY)
Facility: HOSPITAL | Age: 50
End: 2025-06-25
Attending: EMERGENCY MEDICINE | Admitting: EMERGENCY MEDICINE
Payer: COMMERCIAL

## 2025-06-24 DIAGNOSIS — F44.7 FUNCTIONAL NEUROLOGICAL SYMPTOM DISORDER WITH MIXED SYMPTOMS: ICD-10-CM

## 2025-06-24 DIAGNOSIS — F41.9 SEVERE ANXIETY: Primary | ICD-10-CM

## 2025-06-24 DIAGNOSIS — Z00.8 MEDICAL CLEARANCE FOR PSYCHIATRIC ADMISSION: ICD-10-CM

## 2025-06-24 LAB — ETHANOL EXG-MCNC: 0 MG/DL

## 2025-06-24 PROCEDURE — 82075 ASSAY OF BREATH ETHANOL: CPT | Performed by: EMERGENCY MEDICINE

## 2025-06-24 PROCEDURE — 80307 DRUG TEST PRSMV CHEM ANLYZR: CPT | Performed by: EMERGENCY MEDICINE

## 2025-06-24 PROCEDURE — 99284 EMERGENCY DEPT VISIT MOD MDM: CPT

## 2025-06-24 PROCEDURE — 99285 EMERGENCY DEPT VISIT HI MDM: CPT | Performed by: EMERGENCY MEDICINE

## 2025-06-24 RX ORDER — TRAZODONE HYDROCHLORIDE 100 MG/1
100 TABLET ORAL
Status: DISCONTINUED | OUTPATIENT
Start: 2025-06-24 | End: 2025-06-25 | Stop reason: HOSPADM

## 2025-06-24 RX ORDER — BUSPIRONE HYDROCHLORIDE 5 MG/1
15 TABLET ORAL 2 TIMES DAILY
Status: DISCONTINUED | OUTPATIENT
Start: 2025-06-25 | End: 2025-06-25 | Stop reason: HOSPADM

## 2025-06-24 RX ORDER — RISPERIDONE 1 MG/1
4 TABLET ORAL 2 TIMES DAILY
Status: DISCONTINUED | OUTPATIENT
Start: 2025-06-24 | End: 2025-06-25 | Stop reason: HOSPADM

## 2025-06-24 RX ADMIN — TRAZODONE HYDROCHLORIDE 100 MG: 100 TABLET ORAL at 23:42

## 2025-06-24 RX ADMIN — RISPERIDONE 4 MG: 1 TABLET, FILM COATED ORAL at 23:42

## 2025-06-24 NOTE — ED PROVIDER NOTES
"Time reflects when diagnosis was documented in both MDM as applicable and the Disposition within this note       Time User Action Codes Description Comment    6/24/2025 10:42 PM Beto March [F41.9] Severe anxiety     6/24/2025 10:42 PM Beto March [F44.7] Functional neurological symptom disorder with mixed symptoms           ED Disposition       ED Disposition   Transfer to Behavioral Health Condition   --    Date/Time   Tue Jun 24, 2025 10:44 PM    Comment   Ibrahima Adams should be transferred out to Behavioral Health and has been medically cleared.               Assessment & Plan       Medical Decision Making  51 y/o male with history of anxiety and functional neurological disorder presents to the ED via EMS from home for evaluation of severe anxiety and worsening of his functional neurological disorder.  The patient's wife assists with providing history.  The patient has been experiencing severe anxiety with functional neurological disorder that was diagnosed approximately a year and a half ago.  The patient's symptoms include episodes where he becomes very loud and with erratic behavior, displaying Tourette's-like syndrome including loud repetitive cursing, repetitive rubbing of his face frequently and often with both hands to the point that he causes abrasions to his face, and forcibly striking himself in the chest with his closed fist.  The patient's wife reports that these symptoms have been worsening over the last several months and have caused the patient to lose 2 jobs.  She also reports episodes in the last few weeks where he did do dangerous things at home that he \"does not remember\", including trying to turn on the stove but not realizing that the stove did not ignite and instead filling the room with gas, and then also putting rubber/plastic Tupperware lids in the oven and turning the oven on, causing the objects to burn and melts.  The patient states that he does not recall these " episodes.  He denies any SI, HI, or hallucinations.  The patient's wife notes that he has been evaluated for the symptoms medically after they started a year and a half ago and he was found to have no organic cause per neurology evaluation.  The patient was deemed to have functional neurological disorder and has been following up with psychiatry in the outpatient setting.    Vital signs reviewed.  Very anxious appearing.  Difficult to obtain verbal history from the patient as he is displaying verbal tics with loud cursing/shouting that is interrupting his attempts to answer questions.  The patient also displays repetitive behaviors such as forcefully rubbing his face over and over again, leading to superficial skin injury/abrasion and striking his chest forcefully with his closed fist multiple times.  He denies SI, HI, or hallucinations.  See physical exam documentation for full exam findings. On my assessment, the patient has severe anxiety and debilitating manifestations of his functional neurological disorder, including intrusive verbal tics/cursing, excessive rubbing of the face leading to skin injury, and self injures behavior forcibly striking his own chest with a close fist repeatedly.  The symptoms are severely limiting the patient's ability to adequately care for himself and perform ADLs.  The patient's wife petition for 3 to be accounting and I planned to uphold, however after discussion with the patient he agrees to sign voluntary 201.  Crisis consulted.  201 signed.  Placement pending.  Care for the patient was signed over at change of shift to Dr. Dominguez, pending completion of transfer.    Amount and/or Complexity of Data Reviewed  Labs: ordered.    Risk  Prescription drug management.  Decision regarding hospitalization.        ED Course as of 06/25/25 0703   Tue Jun 24, 2025 2207 Crisis consulted, 201 signed       Medications   sertraline (ZOLOFT) tablet 100 mg (has no administration in time  "range)   busPIRone (BUSPAR) tablet 15 mg (has no administration in time range)   risperiDONE (RisperDAL) tablet 4 mg (4 mg Oral Given 6/24/25 2342)   traZODone (DESYREL) tablet 100 mg (100 mg Oral Given 6/24/25 2342)       ED Risk Strat Scores                    No data recorded                            History of Present Illness       Chief Complaint   Patient presents with    Psychiatric Evaluation     Pt arrived via ems, wife called for concerns of erratic behavior. Pt hitting himself at bedside.        Past Medical History[1]   Past Surgical History[2]   Family History[3]   Social History[4]   E-Cigarette/Vaping    E-Cigarette Use Never User       E-Cigarette/Vaping Substances    Nicotine No     THC No     CBD No     Flavoring No     Other No     Unknown No       I have reviewed and agree with the history as documented.     49 y/o male with history of anxiety and functional neurological disorder presents to the ED via EMS from home for evaluation of severe anxiety and worsening of his functional neurological disorder.  The patient's wife assists with providing history.  The patient has been experiencing severe anxiety with functional neurological disorder that was diagnosed approximately a year and a half ago.  The patient's symptoms include episodes where he becomes very loud and with erratic behavior, displaying Tourette's-like syndrome including loud repetitive cursing, repetitive rubbing of his face frequently and often with both hands to the point that he causes abrasions to his face, and forcibly striking himself in the chest with his closed fist.  The patient's wife reports that these symptoms have been worsening over the last several months and have caused the patient to lose 2 jobs.  She also reports episodes in the last few weeks where he did do dangerous things at home that he \"does not remember\", including trying to turn on the stove but not realizing that the stove did not ignite and instead " filling the room with gas, and then also putting rubber/plastic Tupperware lids in the oven and turning the oven on, causing the objects to burn and melts.  The patient states that he does not recall these episodes.  He denies any SI, HI, or hallucinations.  The patient's wife notes that he has been evaluated for the symptoms medically after they started a year and a half ago and he was found to have no organic cause per neurology evaluation.  The patient was deemed to have functional neurological disorder and has been following up with psychiatry in the outpatient setting.        Review of Systems   Constitutional:  Negative for chills and fever.   HENT:  Negative for congestion, rhinorrhea and sore throat.    Respiratory:  Negative for cough and shortness of breath.    Cardiovascular:  Negative for chest pain and palpitations.   Gastrointestinal:  Negative for abdominal pain, diarrhea, nausea and vomiting.   Genitourinary:  Negative for dysuria and hematuria.   Musculoskeletal:  Negative for back pain and neck pain.   Neurological:  Negative for weakness, light-headedness, numbness and headaches.   Psychiatric/Behavioral:          See HPI   All other systems reviewed and are negative.          Objective       ED Triage Vitals   Temperature Pulse Blood Pressure Respirations SpO2 Patient Position - Orthostatic VS   06/24/25 2020 06/24/25 1948 06/24/25 1948 06/24/25 1948 06/24/25 1948 06/24/25 1948   98.1 °F (36.7 °C) (!) 144 116/55 20 92 % Lying      Temp Source Heart Rate Source BP Location FiO2 (%) Pain Score    06/24/25 2020 06/24/25 2351 06/24/25 1948 -- --    Oral Monitor Right arm        Vitals      Date and Time Temp Pulse SpO2 Resp BP Pain Score FACES Pain Rating User   06/25/25 0632 -- 76 98 % 16 137/67 -- -- AF   06/24/25 2351 98.3 °F (36.8 °C) 96 98 % 16 155/87 -- -- AF   06/24/25 2020 98.1 °F (36.7 °C) -- -- -- -- -- -- SB   06/24/25 1948 -- 144 pt moving unable to sit still 92 % 20 116/55 -- -- AF             Physical Exam  Vitals and nursing note reviewed.   Constitutional:       General: He is in acute distress.      Appearance: Normal appearance. He is normal weight.   HENT:      Head: Normocephalic and atraumatic.      Right Ear: External ear normal.      Left Ear: External ear normal.      Nose: Nose normal.      Mouth/Throat:      Mouth: Mucous membranes are moist.      Pharynx: Oropharynx is clear. No oropharyngeal exudate or posterior oropharyngeal erythema.     Eyes:      Extraocular Movements: Extraocular movements intact.      Conjunctiva/sclera: Conjunctivae normal.      Pupils: Pupils are equal, round, and reactive to light.       Cardiovascular:      Rate and Rhythm: Normal rate and regular rhythm.      Pulses: Normal pulses.      Heart sounds: Normal heart sounds.   Pulmonary:      Effort: Pulmonary effort is normal. No respiratory distress.      Breath sounds: Normal breath sounds. No wheezing or rales.   Abdominal:      General: Abdomen is flat. Bowel sounds are normal. There is no distension.      Palpations: Abdomen is soft.      Tenderness: There is no abdominal tenderness. There is no right CVA tenderness, left CVA tenderness or guarding.     Musculoskeletal:         General: No swelling or tenderness. Normal range of motion.      Cervical back: Normal range of motion and neck supple. No tenderness.     Skin:     General: Skin is warm and dry.     Neurological:      General: No focal deficit present.      Mental Status: He is alert and oriented to person, place, and time.     Psychiatric:      Comments: Very anxious appearing.  Difficult to obtain verbal history from the patient as he is displaying verbal tics with loud cursing/shouting that is interrupting his attempts to answer questions.  The patient also displays repetitive behaviors such as forcefully rubbing his face over and over again, leading to superficial skin injury/abrasion and striking his chest forcefully with his closed fist  multiple times.  He denies SI, HI, or hallucinations.         Results Reviewed       Procedure Component Value Units Date/Time    Rapid drug screen, urine [[id number]]  (Abnormal) Collected: 06/24/25 2347    Lab Status: Final result Specimen: Urine, Clean Catch Updated: 06/25/25 0014     Amph/Meth UR Negative     Barbiturate Ur Negative     Benzodiazepine Urine Negative     Cocaine Urine Negative     Methadone Urine Negative     Opiate Urine Negative     PCP Ur Negative     THC Urine Negative     Oxycodone Urine Negative     Fentanyl Urine Positive     HYDROCODONE URINE Negative    Narrative:      Presumptive report. If requested, specimen will be sent to reference lab for confirmation.  FOR MEDICAL PURPOSES ONLY.   IF CONFIRMATION NEEDED PLEASE CONTACT THE LAB WITHIN 5 DAYS.    Drug Screen Cutoff Levels:  AMPHETAMINE/METHAMPHETAMINES  1000 ng/mL  BARBITURATES     200 ng/mL  BENZODIAZEPINES     200 ng/mL  COCAINE      300 ng/mL  METHADONE      300 ng/mL  OPIATES      300 ng/mL  PHENCYCLIDINE     25 ng/mL  THC       50 ng/mL  OXYCODONE      100 ng/mL  FENTANYL      5 ng/mL  HYDROCODONE     300 ng/mL    POCT alcohol breath test [[id number]]  (Normal) Collected: 06/24/25 2019    Lab Status: Final result Updated: 06/24/25 2019     EXTBreath Alcohol 0.0            No orders to display       Procedures    ED Medication and Procedure Management   Prior to Admission Medications   Prescriptions Last Dose Informant Patient Reported? Taking?   busPIRone (BUSPAR) 15 mg tablet 6/24/2025 Evening  No Yes   Sig: TAKE ONE TABLET BY MOUTH TWICE A DAY   risperiDONE (RisperDAL) 4 mg tablet 6/24/2025 Noon  No Yes   Sig: Take 1 tablet (4 mg total) by mouth 2 (two) times a day   sertraline (ZOLOFT) 100 mg tablet 6/24/2025  No Yes   Sig: Take 1 tablet (100 mg total) by mouth every morning   traZODone (DESYREL) 100 mg tablet 6/23/2025  No Yes   Sig: Take 1 tablet (100 mg total) by mouth daily at bedtime      Facility-Administered Medications:  None     Patient's Medications   Discharge Prescriptions    No medications on file     No discharge procedures on file.  ED SEPSIS DOCUMENTATION   Time reflects when diagnosis was documented in both MDM as applicable and the Disposition within this note       Time User Action Codes Description Comment    6/24/2025 10:42 PM Beto March [F41.9] Severe anxiety     6/24/2025 10:42 PM Beto March [F44.7] Functional neurological symptom disorder with mixed symptoms                    [1]   Past Medical History:  Diagnosis Date    Chronic back pain    [2]   Past Surgical History:  Procedure Laterality Date    WISDOM TOOTH EXTRACTION     [3]   Family History  Problem Relation Name Age of Onset    Cirrhosis Mother      Cholelithiasis Mother      Diabetes type II Father      Stroke Maternal Grandfather      Diabetes Paternal Grandfather     [4]   Social History  Tobacco Use    Smoking status: Former     Types: Cigars    Smokeless tobacco: Never    Tobacco comments:     has a cigar about 2/month   Vaping Use    Vaping status: Never Used   Substance Use Topics    Alcohol use: Yes     Alcohol/week: 2.0 standard drinks of alcohol     Types: 1 Glasses of wine, 1 Cans of beer per week     Comment: 3 drinks/week    Drug use: Not Currently     Types: Marijuana, Cocaine, Oxycodone     Comment: snorted cocain about 10 years ago for 1 year        Beto March MD  06/25/25 0703

## 2025-06-25 ENCOUNTER — HOSPITAL ENCOUNTER (INPATIENT)
Facility: HOSPITAL | Age: 50
LOS: 3 days | Discharge: HOME | DRG: 885 | End: 2025-06-28
Attending: STUDENT IN AN ORGANIZED HEALTH CARE EDUCATION/TRAINING PROGRAM | Admitting: STUDENT IN AN ORGANIZED HEALTH CARE EDUCATION/TRAINING PROGRAM
Payer: COMMERCIAL

## 2025-06-25 VITALS
RESPIRATION RATE: 16 BRPM | SYSTOLIC BLOOD PRESSURE: 137 MMHG | TEMPERATURE: 98.3 F | DIASTOLIC BLOOD PRESSURE: 67 MMHG | HEART RATE: 76 BPM | OXYGEN SATURATION: 98 %

## 2025-06-25 DIAGNOSIS — F41.9 ANXIETY DISORDER, UNSPECIFIED TYPE: Primary | ICD-10-CM

## 2025-06-25 DIAGNOSIS — F39 MOOD DISORDER (HCC): Chronic | ICD-10-CM

## 2025-06-25 DIAGNOSIS — Z00.8 MEDICAL CLEARANCE FOR PSYCHIATRIC ADMISSION: ICD-10-CM

## 2025-06-25 LAB
AMPHETAMINES SERPL QL SCN: NEGATIVE
BARBITURATES UR QL: NEGATIVE
BENZODIAZ UR QL: NEGATIVE
COCAINE UR QL: NEGATIVE
FENTANYL UR QL SCN: POSITIVE
HYDROCODONE UR QL SCN: NEGATIVE
METHADONE UR QL: NEGATIVE
OPIATES UR QL SCN: NEGATIVE
OXYCODONE+OXYMORPHONE UR QL SCN: NEGATIVE
PCP UR QL: NEGATIVE
THC UR QL: NEGATIVE

## 2025-06-25 PROCEDURE — GZHZZZZ GROUP PSYCHOTHERAPY: ICD-10-PCS | Performed by: STUDENT IN AN ORGANIZED HEALTH CARE EDUCATION/TRAINING PROGRAM

## 2025-06-25 RX ORDER — LORAZEPAM 2 MG/ML
1 INJECTION INTRAMUSCULAR
Status: CANCELLED | OUTPATIENT
Start: 2025-06-25

## 2025-06-25 RX ORDER — LORAZEPAM 2 MG/ML
1 INJECTION INTRAMUSCULAR EVERY 4 HOURS PRN
Status: CANCELLED | OUTPATIENT
Start: 2025-06-25

## 2025-06-25 RX ORDER — BENZTROPINE MESYLATE 1 MG/ML
1 INJECTION, SOLUTION INTRAMUSCULAR; INTRAVENOUS
Status: DISCONTINUED | OUTPATIENT
Start: 2025-06-25 | End: 2025-06-28 | Stop reason: HOSPADM

## 2025-06-25 RX ORDER — ACETAMINOPHEN 325 MG/1
975 TABLET ORAL EVERY 6 HOURS PRN
Status: DISCONTINUED | OUTPATIENT
Start: 2025-06-25 | End: 2025-06-28 | Stop reason: HOSPADM

## 2025-06-25 RX ORDER — HALOPERIDOL 5 MG/ML
2.5 INJECTION INTRAMUSCULAR
Status: DISCONTINUED | OUTPATIENT
Start: 2025-06-25 | End: 2025-06-28 | Stop reason: HOSPADM

## 2025-06-25 RX ORDER — HALOPERIDOL 5 MG/1
2.5 TABLET ORAL
Status: DISCONTINUED | OUTPATIENT
Start: 2025-06-25 | End: 2025-06-28 | Stop reason: HOSPADM

## 2025-06-25 RX ORDER — BENZTROPINE MESYLATE 1 MG/ML
0.5 INJECTION, SOLUTION INTRAMUSCULAR; INTRAVENOUS
Status: CANCELLED | OUTPATIENT
Start: 2025-06-25

## 2025-06-25 RX ORDER — HALOPERIDOL 5 MG/1
2.5 TABLET ORAL
Status: CANCELLED | OUTPATIENT
Start: 2025-06-25

## 2025-06-25 RX ORDER — LORAZEPAM 2 MG/ML
2 INJECTION INTRAMUSCULAR
Status: CANCELLED | OUTPATIENT
Start: 2025-06-25

## 2025-06-25 RX ORDER — POLYETHYLENE GLYCOL 3350 17 G/17G
17 POWDER, FOR SOLUTION ORAL DAILY PRN
Status: DISCONTINUED | OUTPATIENT
Start: 2025-06-25 | End: 2025-06-28 | Stop reason: HOSPADM

## 2025-06-25 RX ORDER — LORAZEPAM 2 MG/ML
2 INJECTION INTRAMUSCULAR
Status: DISCONTINUED | OUTPATIENT
Start: 2025-06-25 | End: 2025-06-28 | Stop reason: HOSPADM

## 2025-06-25 RX ORDER — ACETAMINOPHEN 325 MG/1
975 TABLET ORAL EVERY 6 HOURS PRN
Status: CANCELLED | OUTPATIENT
Start: 2025-06-25

## 2025-06-25 RX ORDER — BENZTROPINE MESYLATE 1 MG/ML
0.5 INJECTION, SOLUTION INTRAMUSCULAR; INTRAVENOUS
Status: DISCONTINUED | OUTPATIENT
Start: 2025-06-25 | End: 2025-06-28 | Stop reason: HOSPADM

## 2025-06-25 RX ORDER — HALOPERIDOL 5 MG/ML
5 INJECTION INTRAMUSCULAR
Status: CANCELLED | OUTPATIENT
Start: 2025-06-25

## 2025-06-25 RX ORDER — ACETAMINOPHEN 325 MG/1
650 TABLET ORAL EVERY 4 HOURS PRN
Status: DISCONTINUED | OUTPATIENT
Start: 2025-06-25 | End: 2025-06-28 | Stop reason: HOSPADM

## 2025-06-25 RX ORDER — LORAZEPAM 2 MG/ML
1 INJECTION INTRAMUSCULAR EVERY 4 HOURS PRN
Status: DISCONTINUED | OUTPATIENT
Start: 2025-06-25 | End: 2025-06-28 | Stop reason: HOSPADM

## 2025-06-25 RX ORDER — HYDROXYZINE HYDROCHLORIDE 25 MG/1
25 TABLET, FILM COATED ORAL
Status: DISCONTINUED | OUTPATIENT
Start: 2025-06-25 | End: 2025-06-28 | Stop reason: HOSPADM

## 2025-06-25 RX ORDER — HALOPERIDOL 5 MG/ML
2.5 INJECTION INTRAMUSCULAR
Status: CANCELLED | OUTPATIENT
Start: 2025-06-25

## 2025-06-25 RX ORDER — ACETAMINOPHEN 325 MG/1
650 TABLET ORAL EVERY 4 HOURS PRN
Status: CANCELLED | OUTPATIENT
Start: 2025-06-25

## 2025-06-25 RX ORDER — MAGNESIUM HYDROXIDE/ALUMINUM HYDROXICE/SIMETHICONE 120; 1200; 1200 MG/30ML; MG/30ML; MG/30ML
30 SUSPENSION ORAL EVERY 4 HOURS PRN
Status: CANCELLED | OUTPATIENT
Start: 2025-06-25

## 2025-06-25 RX ORDER — ACETAMINOPHEN 325 MG/1
650 TABLET ORAL EVERY 6 HOURS PRN
Status: CANCELLED | OUTPATIENT
Start: 2025-06-25

## 2025-06-25 RX ORDER — BENZTROPINE MESYLATE 1 MG/ML
1 INJECTION, SOLUTION INTRAMUSCULAR; INTRAVENOUS
Status: CANCELLED | OUTPATIENT
Start: 2025-06-25

## 2025-06-25 RX ORDER — HALOPERIDOL 1 MG/1
1 TABLET ORAL EVERY 6 HOURS PRN
Status: DISCONTINUED | OUTPATIENT
Start: 2025-06-25 | End: 2025-06-28 | Stop reason: HOSPADM

## 2025-06-25 RX ORDER — HYDROXYZINE HYDROCHLORIDE 25 MG/1
25 TABLET, FILM COATED ORAL
Status: CANCELLED | OUTPATIENT
Start: 2025-06-25

## 2025-06-25 RX ORDER — PROPRANOLOL HYDROCHLORIDE 10 MG/1
10 TABLET ORAL EVERY 8 HOURS PRN
Status: DISCONTINUED | OUTPATIENT
Start: 2025-06-25 | End: 2025-06-28 | Stop reason: HOSPADM

## 2025-06-25 RX ORDER — LORAZEPAM 1 MG/1
1 TABLET ORAL
Status: CANCELLED | OUTPATIENT
Start: 2025-06-25

## 2025-06-25 RX ORDER — MAGNESIUM HYDROXIDE/ALUMINUM HYDROXICE/SIMETHICONE 120; 1200; 1200 MG/30ML; MG/30ML; MG/30ML
30 SUSPENSION ORAL EVERY 4 HOURS PRN
Status: DISCONTINUED | OUTPATIENT
Start: 2025-06-25 | End: 2025-06-28 | Stop reason: HOSPADM

## 2025-06-25 RX ORDER — BENZTROPINE MESYLATE 1 MG/1
1 TABLET ORAL
Status: CANCELLED | OUTPATIENT
Start: 2025-06-25

## 2025-06-25 RX ORDER — TRAZODONE HYDROCHLORIDE 100 MG/1
100 TABLET ORAL
Status: DISCONTINUED | OUTPATIENT
Start: 2025-06-25 | End: 2025-06-28 | Stop reason: HOSPADM

## 2025-06-25 RX ORDER — HALOPERIDOL 1 MG/1
1 TABLET ORAL EVERY 6 HOURS PRN
Status: CANCELLED | OUTPATIENT
Start: 2025-06-25

## 2025-06-25 RX ORDER — BUSPIRONE HYDROCHLORIDE 15 MG/1
15 TABLET ORAL 2 TIMES DAILY
Status: DISCONTINUED | OUTPATIENT
Start: 2025-06-25 | End: 2025-06-28 | Stop reason: HOSPADM

## 2025-06-25 RX ORDER — TRAZODONE HYDROCHLORIDE 50 MG/1
50 TABLET ORAL
Status: DISCONTINUED | OUTPATIENT
Start: 2025-06-25 | End: 2025-06-28 | Stop reason: HOSPADM

## 2025-06-25 RX ORDER — TRAZODONE HYDROCHLORIDE 50 MG/1
50 TABLET ORAL
Status: CANCELLED | OUTPATIENT
Start: 2025-06-25

## 2025-06-25 RX ORDER — HYDROXYZINE HYDROCHLORIDE 25 MG/1
50 TABLET, FILM COATED ORAL
Status: CANCELLED | OUTPATIENT
Start: 2025-06-25

## 2025-06-25 RX ORDER — BENZTROPINE MESYLATE 1 MG/1
1 TABLET ORAL
Status: DISCONTINUED | OUTPATIENT
Start: 2025-06-25 | End: 2025-06-28 | Stop reason: HOSPADM

## 2025-06-25 RX ORDER — POLYETHYLENE GLYCOL 3350 17 G/17G
17 POWDER, FOR SOLUTION ORAL DAILY PRN
Status: CANCELLED | OUTPATIENT
Start: 2025-06-25

## 2025-06-25 RX ORDER — BUSPIRONE HYDROCHLORIDE 5 MG/1
15 TABLET ORAL 2 TIMES DAILY
Status: CANCELLED | OUTPATIENT
Start: 2025-06-25

## 2025-06-25 RX ORDER — ACETAMINOPHEN 325 MG/1
650 TABLET ORAL EVERY 6 HOURS PRN
Status: DISCONTINUED | OUTPATIENT
Start: 2025-06-25 | End: 2025-06-28 | Stop reason: HOSPADM

## 2025-06-25 RX ORDER — LORAZEPAM 2 MG/ML
1 INJECTION INTRAMUSCULAR
Status: DISCONTINUED | OUTPATIENT
Start: 2025-06-25 | End: 2025-06-28 | Stop reason: HOSPADM

## 2025-06-25 RX ORDER — HALOPERIDOL 5 MG/1
5 TABLET ORAL
Status: CANCELLED | OUTPATIENT
Start: 2025-06-25

## 2025-06-25 RX ORDER — HALOPERIDOL 5 MG/ML
5 INJECTION INTRAMUSCULAR
Status: DISCONTINUED | OUTPATIENT
Start: 2025-06-25 | End: 2025-06-28 | Stop reason: HOSPADM

## 2025-06-25 RX ORDER — HYDROXYZINE HYDROCHLORIDE 50 MG/1
50 TABLET, FILM COATED ORAL
Status: DISCONTINUED | OUTPATIENT
Start: 2025-06-25 | End: 2025-06-28 | Stop reason: HOSPADM

## 2025-06-25 RX ORDER — HALOPERIDOL 5 MG/1
5 TABLET ORAL
Status: DISCONTINUED | OUTPATIENT
Start: 2025-06-25 | End: 2025-06-28 | Stop reason: HOSPADM

## 2025-06-25 RX ORDER — RISPERIDONE 1 MG/1
4 TABLET ORAL 2 TIMES DAILY
Status: CANCELLED | OUTPATIENT
Start: 2025-06-25

## 2025-06-25 RX ORDER — LORAZEPAM 1 MG/1
1 TABLET ORAL
Status: DISCONTINUED | OUTPATIENT
Start: 2025-06-25 | End: 2025-06-28 | Stop reason: HOSPADM

## 2025-06-25 RX ORDER — TRAZODONE HYDROCHLORIDE 100 MG/1
100 TABLET ORAL
Status: CANCELLED | OUTPATIENT
Start: 2025-06-25

## 2025-06-25 RX ORDER — SERTRALINE HYDROCHLORIDE 100 MG/1
100 TABLET, FILM COATED ORAL DAILY
Status: DISCONTINUED | OUTPATIENT
Start: 2025-06-26 | End: 2025-06-28 | Stop reason: HOSPADM

## 2025-06-25 RX ORDER — RISPERIDONE 2 MG/1
4 TABLET ORAL 2 TIMES DAILY
Status: DISCONTINUED | OUTPATIENT
Start: 2025-06-25 | End: 2025-06-28 | Stop reason: HOSPADM

## 2025-06-25 RX ORDER — PROPRANOLOL HCL 20 MG
10 TABLET ORAL EVERY 8 HOURS PRN
Status: CANCELLED | OUTPATIENT
Start: 2025-06-25

## 2025-06-25 RX ADMIN — SERTRALINE HYDROCHLORIDE 100 MG: 50 TABLET ORAL at 09:37

## 2025-06-25 RX ADMIN — BUSPIRONE HYDROCHLORIDE 15 MG: 15 TABLET ORAL at 17:47

## 2025-06-25 RX ADMIN — RISPERIDONE 4 MG: 2 TABLET, FILM COATED ORAL at 17:47

## 2025-06-25 RX ADMIN — RISPERIDONE 4 MG: 1 TABLET, FILM COATED ORAL at 09:38

## 2025-06-25 RX ADMIN — BUSPIRONE HYDROCHLORIDE 15 MG: 5 TABLET ORAL at 09:38

## 2025-06-25 RX ADMIN — TRAZODONE HYDROCHLORIDE 100 MG: 100 TABLET ORAL at 21:27

## 2025-06-25 NOTE — NURSING NOTE
BEDSIDE  2 FLANNEL SHIRTS  2 PAIR UNDERWEAR  1 PAIR SLIPPERS  STORAGE  6 PAIR UNDERWEAR  1 DARK GRAY ATHLETIC SHORTS  1 WHITE PHONE   1 CELL PHONE WITH BLUE CASE  1 SILVER IN COLOR CHAIN NECKLACE IN SPECIMEN CONTAINER  1 BROWN WALLET CONTAINING DRIVERS LICENSE AND 4 CREDIT CARDS  8 PAIR GARCÍA SOCKS  1 BIRTHDAY CARD YELLOW ENVELOPE  13 T-SHIRTS    3 LONG SLEEVE  SHIRT  1 PAIR WINTER SOCKS  1 WHITE PAIR SOCKS  1 BLACK PAIR SOCKS  1 BLUE SWEAT SHIRT  1 GRAY AND BLACK BACK PACK  2 BLUE JEANS  2 PAIR FLIP FLOPS  3 DEODORANTS  2 BRUSH  1 CHAP STICK  1 HAIR BRUSH  1 JANG SHORTS

## 2025-06-25 NOTE — EMTALA/ACUTE CARE TRANSFER
[facility] EMERGENCY DEPARTMENT  [address] [place] [id number]  Dept: [phone]      EMTALA TRANSFER CONSENT    NAME Ibrahima Adams                                         1975                              MRN [id number]    I have been informed of my rights regarding examination, treatment, and transfer   by Dr. Martir Dominguez MD    Benefits: Specialized equipment and/or services available at the receiving facility (Include comment)________________________ (Inpatient behavioral health)    Risks: Potential for delay in receiving treatment, Potential deterioration of medical condition, Increased discomfort during transfer, Possible worsening of condition or death during transfer      Consent for Transfer:  I acknowledge that my medical condition has been evaluated and explained to me by the emergency department physician or other qualified medical person and/or my attending physician, who has recommended that I be transferred to the service of  Accepting Physician: Dr. Caraballo. at Accepting Facility Name, City & State : [facility]. The above potential benefits of such transfer, the potential risks associated with such transfer, and the probable risks of not being transferred have been explained to me, and I fully understand them.  The doctor has explained that, in my case, the benefits of transfer outweigh the risks.  I agree to be transferred.    I authorize the performance of emergency medical procedures and treatments upon me in both transit and upon arrival at the receiving facility.  Additionally, I authorize the release of any and all medical records to the receiving facility and request they be transported with me, if possible.  I understand that the safest mode of transportation during a medical emergency is an ambulance and that the Hospital advocates the use of this mode of transport. Risks of traveling to the receiving facility by car, including  absence of medical control, life sustaining equipment, such as oxygen, and medical personnel has been explained to me and I fully understand them.    (KYLAH CORRECT BOX BELOW)  [  ]  I consent to the stated transfer and to be transported by ambulance/helicopter.  [  ]  I consent to the stated transfer, but refuse transportation by ambulance and accept full responsibility for my transportation by car.  I understand the risks of non-ambulance transfers and I exonerate the Hospital and its staff from any deterioration in my condition that results from this refusal.    X___________________________________________    DATE  25  TIME________  Signature of patient or legally responsible individual signing on patient behalf           RELATIONSHIP TO PATIENT_________________________          Provider Certification    NAME Ibrahima Adams                                        [facility] 1975                              MRN [id number]    A medical screening exam was performed on the above named patient.  Based on the examination:    Condition Necessitating Transfer The primary encounter diagnosis was Severe anxiety. Diagnoses of Functional neurological symptom disorder with mixed symptoms and Medical clearance for psychiatric admission were also pertinent to this visit.    Patient Condition: The patient has been stabilized such that within reasonable medical probability, no material deterioration of the patient condition or the condition of the unborn child(atilio) is likely to result from the transfer    Reason for Transfer: Level of Care needed not available at this facility    Transfer Requirements: Facility [facility]   Space available and qualified personnel available for treatment as acknowledged by    Agreed to accept transfer and to provide appropriate medical treatment as acknowledged by       Dr. Caraballo.  Appropriate medical records of the examination and treatment of the patient are provided at the  time of transfer   STAFF INITIAL WHEN COMPLETED _______  Transfer will be performed by qualified personnel from    and appropriate transfer equipment as required, including the use of necessary and appropriate life support measures.    Provider Certification: I have examined the patient and explained the following risks and benefits of being transferred/refusing transfer to the patient/family:  General risk, such as traffic hazards, adverse weather conditions, rough terrain or turbulence, possible failure of equipment (including vehicle or aircraft), or consequences of actions of persons outside the control of the transport personnel, Unanticipated needs of medical equipment and personnel during transport, Risk of worsening condition, The possibility of a transport vehicle being unavailable      Based on these reasonable risks and benefits to the patient and/or the unborn child(atilio), and based upon the information available at the time of the patient’s examination, I certify that the medical benefits reasonably to be expected from the provision of appropriate medical treatments at another medical facility outweigh the increasing risks, if any, to the individual’s medical condition, and in the case of labor to the unborn child, from effecting the transfer.    X____________________________________________ DATE 06/25/25        TIME_______      ORIGINAL - SEND TO MEDICAL RECORDS   COPY - SEND WITH PATIENT DURING TRANSFER

## 2025-06-25 NOTE — ED NOTES
Phone  provided by wife locked in cabinet with rest of patients belongings.     Karime Anderson RN  06/24/25 [id number]

## 2025-06-25 NOTE — ED NOTES
Chart reviewed.  Arrived on 302 though signed 201.  Bed search ongoing.  Intake has the referral.  [facility] bed availability TBD.     Emailed NSMD 302 along with copy of 201 to Yanelis Vyas [facility].

## 2025-06-25 NOTE — NURSING NOTE
"Patient resting in bed at the start of the shift. Ate 100% of dinner. During nursing assessment, denies current anxiety, \"but it's been bad the past few weeks\". Denies SI/HI and hallucinations. Patient questioning 72 hour form that he signed, and education provided. Patient is aware that he may rescind the form and continue treatment.   "

## 2025-06-25 NOTE — PLAN OF CARE
Problem: BEHAVIOR  Goal: Pt/Family maintain appropriate behavior and adhere to behavioral management agreement, if implemented  Description: INTERVENTIONS:  - Assess the family dynamic   - Encourage verbalization of thoughts and concerns in a socially appropriate manner  - Assess patient/family's coping skills and non-compliant behavior (including use of illegal substances).  - Utilize positive, consistent limit setting strategies supporting safety of patient, staff and others  - Initiate consult with Case Management, Spiritual Care or other ancillary services as appropriate  - If a patient's/visitor's behavior jeopardizes the safety of the patient, staff, or others, refer to organization procedure.   - Notify Security of behavior or suspected illegal substances which indicate the need for search of the patient and/or belongings  - Encourage participation in the decision making process about a behavioral management agreement; implement if patient meets criteria  Outcome: Progressing     Problem: ANXIETY  Goal: Will report anxiety at manageable levels  Description: INTERVENTIONS:  - Administer medication as ordered  - Teach and encourage coping skills  - Provide emotional support  - Assess patient/family for anxiety and ability to cope  Outcome: Progressing  Goal: By discharge: Patient will verbalize 2 strategies to deal with anxiety  Description: Interventions:  - Identify any obvious source/trigger to anxiety  - Staff will assist patient in applying identified coping technique/skills  - Encourage attendance of scheduled groups and activities  Outcome: Progressing

## 2025-06-25 NOTE — ED NOTES
[facility] delegated the 302.  [facility] advised patient of the same and explained rights and possible consequences of a 302.  Patient was offered a 201 which he chose to sign.  Rights were explained, served, and understood.

## 2025-06-25 NOTE — ED NOTES
Patient was at this ED last week with c/o anxiety episodes.  Patient's wife, Morena, wanted patient to get IPBH but patient did not want to go and patient did not meet criteria for 302 per EDMD.  Patient was brought in by EMS called by his wife.  Patient was rubbing his face red and hitting his chest.  He had a difficult time speaking.  Patient's hair was wet from sweat.  Morena reported that the patient has these episodes and  which are increasing in severity and frequency.  Patient sometimes leaves bruises on himself and does not remember the episodes afterward.  Patient has a tendency to down play them and states that he is just anxious.  Patient has reportedly turned the gas on on the stove top and threw plastic into the lit oven during episodes. He did not remember doing these things afterward. Patient has lost his job due to the episodes and is in jeopardy of losing the apartment due to his screaming and neighbor complaints.  Morena reported that this started about 1 1/2 years ago and the patient has a psychiatrist and psychologist.  Medication is not working. He reportedly was Dx with Functional Neurological Disorder.  Morena reported that the patient sometimes tries to drive when having an episode.  Patient lacks insight into his condition and does not want IP hospitalization.  Morena reported that over the last year the patient has had medical issues ruled out and doctors believe it is psychological.  Patient has pseudo seizures.  Morena is petitioning a 302.    Patient told [facility] that he was fine and he was just having anxiety.

## 2025-06-25 NOTE — ED NOTES
Contacted [facility] to verify eligibility [phone].  Spoke with Angeli at [facility] who advised that policy is through them.  Behavioral health authorizations are to be completed via phone [phone].     Referral sent to [facility] for review.

## 2025-06-25 NOTE — LETTER
ID # [id number]    [facility] # [id number]    UR Phone # [phone]    DISCHARGE SUMMARY

## 2025-06-25 NOTE — ED NOTES
Met with patient to update.  Under virtual monitoring.  Patient fully alert and oriented, calm cooperative.  Update given.  Patient agreeable to IP  treatment at [facility].  Patient would like to call his wife about bringing in belongings.  Patient given the phone to use.

## 2025-06-25 NOTE — ED NOTES
Patient alert, calm, oriented , and cooperative at this time.     Matilde Paul RN  06/24/25 1140

## 2025-06-25 NOTE — ED NOTES
Patient is accepted at [facility].  Patient is accepted by Dr. Caraballo with orders by YENIFER Cannon per Angeli, -[facility].     Transportation is arranged with [employer].       Nurse report is to be called to [phone] prior to patient transfer.

## 2025-06-25 NOTE — NURSING NOTE
Pt 201 from [facility], initially petitioned as 302 by wife, signed over as a 201 in ED. Reports having increased anxiety. This is pt's first IP hospitalization. Reports having episodes when pt yelling or acting out of character for pt. Reports not always remembering what happened after episode. Pt diagnosed with Functional Neurological Disorder with attacks or seizures. Pt compliant with medication. Never had SI/HI or hallucinations. Has OP services. Lost job. Denies recent drug or etoh use. UDS(+)Fentanyl. 72 hour initiated 06/25 @1444. Pt calm and cooperative during admission process.     Dr. Corley made aware med req completed.

## 2025-06-25 NOTE — PLAN OF CARE
RN will meet with pt at least twice per day to assess for any concerns. Teach about prescribed medications and diagnosis. Pt will be taught and encouraged to utilize healthy coping skills.

## 2025-06-26 PROBLEM — F39 MOOD DISORDER (HCC): Status: ACTIVE | Noted: 2025-06-26

## 2025-06-26 PROBLEM — F39 MOOD DISORDER (HCC): Chronic | Status: ACTIVE | Noted: 2025-06-26

## 2025-06-26 PROBLEM — Z00.8 MEDICAL CLEARANCE FOR PSYCHIATRIC ADMISSION: Status: ACTIVE | Noted: 2025-06-26

## 2025-06-26 LAB
25(OH)D3 SERPL-MCNC: 23 NG/ML (ref 30–100)
ALBUMIN SERPL BCG-MCNC: 4.3 G/DL (ref 3.5–5)
ALP SERPL-CCNC: 52 U/L (ref 34–104)
ALT SERPL W P-5'-P-CCNC: 9 U/L (ref 7–52)
ANION GAP SERPL CALCULATED.3IONS-SCNC: 5 MMOL/L (ref 4–13)
AST SERPL W P-5'-P-CCNC: 11 U/L (ref 13–39)
BASOPHILS # BLD AUTO: 0.07 THOUSANDS/ÂΜL (ref 0–0.1)
BASOPHILS NFR BLD AUTO: 1 % (ref 0–1)
BILIRUB SERPL-MCNC: 0.49 MG/DL (ref 0.2–1)
BUN SERPL-MCNC: 21 MG/DL (ref 5–25)
CALCIUM SERPL-MCNC: 9.6 MG/DL (ref 8.4–10.2)
CHLORIDE SERPL-SCNC: 106 MMOL/L (ref 96–108)
CHOLEST SERPL-MCNC: 183 MG/DL (ref ?–200)
CO2 SERPL-SCNC: 30 MMOL/L (ref 21–32)
CREAT SERPL-MCNC: 1.08 MG/DL (ref 0.6–1.3)
EOSINOPHIL # BLD AUTO: 0.27 THOUSAND/ÂΜL (ref 0–0.61)
EOSINOPHIL NFR BLD AUTO: 5 % (ref 0–6)
ERYTHROCYTE [DISTWIDTH] IN BLOOD BY AUTOMATED COUNT: 12.7 % (ref 11.6–15.1)
EST. AVERAGE GLUCOSE BLD GHB EST-MCNC: 100 MG/DL
FOLATE SERPL-MCNC: 12.1 NG/ML
GFR SERPL CREATININE-BSD FRML MDRD: 80 ML/MIN/1.73SQ M
GLUCOSE P FAST SERPL-MCNC: 82 MG/DL (ref 65–99)
GLUCOSE SERPL-MCNC: 82 MG/DL (ref 65–140)
HBA1C MFR BLD: 5.1 %
HCT VFR BLD AUTO: 41.8 % (ref 36.5–49.3)
HDLC SERPL-MCNC: 45 MG/DL
HGB BLD-MCNC: 14.3 G/DL (ref 12–17)
IMM GRANULOCYTES # BLD AUTO: 0.01 THOUSAND/UL (ref 0–0.2)
IMM GRANULOCYTES NFR BLD AUTO: 0 % (ref 0–2)
LDLC SERPL CALC-MCNC: 102 MG/DL (ref 0–100)
LYMPHOCYTES # BLD AUTO: 1.36 THOUSANDS/ÂΜL (ref 0.6–4.47)
LYMPHOCYTES NFR BLD AUTO: 25 % (ref 14–44)
MAGNESIUM SERPL-MCNC: 1.9 MG/DL (ref 1.9–2.7)
MCH RBC QN AUTO: 31 PG (ref 26.8–34.3)
MCHC RBC AUTO-ENTMCNC: 34.2 G/DL (ref 31.4–37.4)
MCV RBC AUTO: 91 FL (ref 82–98)
MONOCYTES # BLD AUTO: 0.33 THOUSAND/ÂΜL (ref 0.17–1.22)
MONOCYTES NFR BLD AUTO: 6 % (ref 4–12)
NEUTROPHILS # BLD AUTO: 3.36 THOUSANDS/ÂΜL (ref 1.85–7.62)
NEUTS SEG NFR BLD AUTO: 63 % (ref 43–75)
NONHDLC SERPL-MCNC: 138 MG/DL
NRBC BLD AUTO-RTO: 0 /100 WBCS
PHOSPHATE SERPL-MCNC: 3 MG/DL (ref 2.7–4.5)
PLATELET # BLD AUTO: 210 THOUSANDS/UL (ref 149–390)
PMV BLD AUTO: 10.5 FL (ref 8.9–12.7)
POTASSIUM SERPL-SCNC: 4.1 MMOL/L (ref 3.5–5.3)
PROT SERPL-MCNC: 6.7 G/DL (ref 6.4–8.4)
RBC # BLD AUTO: 4.62 MILLION/UL (ref 3.88–5.62)
SODIUM SERPL-SCNC: 141 MMOL/L (ref 135–147)
TRIGL SERPL-MCNC: 181 MG/DL (ref ?–150)
TSH SERPL DL<=0.05 MIU/L-ACNC: 1.45 UIU/ML (ref 0.45–4.5)
VIT B12 SERPL-MCNC: 302 PG/ML (ref 180–914)
WBC # BLD AUTO: 5.4 THOUSAND/UL (ref 4.31–10.16)

## 2025-06-26 PROCEDURE — 99222 1ST HOSP IP/OBS MODERATE 55: CPT | Performed by: STUDENT IN AN ORGANIZED HEALTH CARE EDUCATION/TRAINING PROGRAM

## 2025-06-26 PROCEDURE — 83735 ASSAY OF MAGNESIUM: CPT

## 2025-06-26 PROCEDURE — 99222 1ST HOSP IP/OBS MODERATE 55: CPT | Performed by: PHYSICIAN ASSISTANT

## 2025-06-26 PROCEDURE — 80053 COMPREHEN METABOLIC PANEL: CPT

## 2025-06-26 PROCEDURE — 83036 HEMOGLOBIN GLYCOSYLATED A1C: CPT

## 2025-06-26 PROCEDURE — 84443 ASSAY THYROID STIM HORMONE: CPT

## 2025-06-26 PROCEDURE — 93005 ELECTROCARDIOGRAM TRACING: CPT

## 2025-06-26 PROCEDURE — 82306 VITAMIN D 25 HYDROXY: CPT

## 2025-06-26 PROCEDURE — 85025 COMPLETE CBC W/AUTO DIFF WBC: CPT

## 2025-06-26 PROCEDURE — 82607 VITAMIN B-12: CPT

## 2025-06-26 PROCEDURE — 82746 ASSAY OF FOLIC ACID SERUM: CPT

## 2025-06-26 PROCEDURE — 80061 LIPID PANEL: CPT

## 2025-06-26 PROCEDURE — 84100 ASSAY OF PHOSPHORUS: CPT

## 2025-06-26 RX ADMIN — BUSPIRONE HYDROCHLORIDE 15 MG: 15 TABLET ORAL at 09:07

## 2025-06-26 RX ADMIN — RISPERIDONE 4 MG: 2 TABLET, FILM COATED ORAL at 17:54

## 2025-06-26 RX ADMIN — TRAZODONE HYDROCHLORIDE 100 MG: 100 TABLET ORAL at 21:18

## 2025-06-26 RX ADMIN — BUSPIRONE HYDROCHLORIDE 15 MG: 15 TABLET ORAL at 17:54

## 2025-06-26 RX ADMIN — SERTRALINE 100 MG: 100 TABLET, FILM COATED ORAL at 09:07

## 2025-06-26 RX ADMIN — RISPERIDONE 4 MG: 2 TABLET, FILM COATED ORAL at 09:07

## 2025-06-26 NOTE — ED NOTES
"Insurance Authorization for admission:   Authorization submission attempted through [facility] portal.   Information submitted without issue through steps1-7 of authorization request process.   Upon selecting the treating facility information in step 8, a notification popped up indicating \"user not permitted to submit this type of request\".     Could not complete authorization request due to above message.     Joanie Jarvis, SERGO  06/25/25     [id number]    "

## 2025-06-26 NOTE — ED NOTES
@12:29 6/26/26 - Insurance authorization for IP  admission:   Phone call placed to [employer].  Phone number: [phone].     Spoke to Erica.     7 days approved (6/25-7/1/25).  Level of care: IP .  Review on 7/1/25.   Authorization #   [id number].  Call for review on 7/1/25.

## 2025-06-26 NOTE — ASSESSMENT & PLAN NOTE
Blood pressure 144/87  Not on antihypertensives at home  Outpatient follow-up with family doctor recommended

## 2025-06-26 NOTE — PLAN OF CARE
Problem: DISCHARGE PLANNING  Goal: Discharge to home or other facility with appropriate resources  Description: INTERVENTIONS:  - Identify barriers to discharge w/patient and caregiver  - Arrange for needed discharge resources and transportation as appropriate  - Identify discharge learning needs (meds, wound care, etc.)  - Arrange for interpretive services to assist at discharge as needed  - Refer to Case Management Department for coordinating discharge planning if the patient needs post-hospital services based on physician/advanced practitioner order or complex needs related to functional status, cognitive ability, or social support system  Outcome: Progressing   - CM met with and went over Tx Plan, Intake, and ROIs.    _ Pt is a 201

## 2025-06-26 NOTE — CONSULTS
Consultation - Hospitalist   Name: Ibrahima Adams 50 y.o. male I MRN: [id number]  Unit/Bed#: -02 I Date of Admission: 6/25/2025   Date of Service: 6/26/2025 I Hospital Day: 1   Inpatient consult for Medical Clearance for  patient  Consult performed by: Geo Egan PA-C  Consult ordered by: Eran Cannon PA-C        Physician Requesting Evaluation: Arturo Osorio*   Reason for Evaluation / Principal Problem:     Assessment & Plan  Anxiety disorder  Under 201 status for anxiety, life stress  Resumed home BuSpar, Risperdal, Zoloft, trazodone  Management per psychiatry team  Functional neurological symptom disorder with attacks or seizures  Noted history, management per primary  Hypertension  Blood pressure 144/87  Not on antihypertensives at home  Outpatient follow-up with family doctor recommended  Psychogenic nonepileptic seizure  None recently, management as above  Substance use  UDS positive for fentanyl  Encouraged cessation in light of psychiatric disorder  Medical clearance for psychiatric admission  Patient is medically stable to continue inpatient psychiatric treatment.  Contact [facility] with any questions or concerns.    Counseling / Coordination of Care Time: 30 minutes.  Greater than 50% of total time spent on patient counseling and coordination of care.      History of Present Illness:    Ibrahima Adams is a 50 y.o. male who is originally admitted to the Psychiatry service due to anxiety disorder. We are consulted for medical clearance. Patient should continue all prior to admission medications as prescribed by primary care provider/outpatient specialists.   Available admission lab work and vitals are acceptable.  Patient feels a baseline physical health.  Patient appears medically stable for inpatient psychiatric treatment at this time. Please contact [facility] with any medical questions or concerns if issues should arise.     Review of Systems:    ROS unable to be preformed due to  psychiatric disorder.    Past Medical and Surgical History:     Past Medical History[1]    Past Surgical History[2]    Meds/Allergies:    all medications and allergies reviewed    Allergies: Allergies[3]    Social History:     Marital Status: /Civil Union    Substance Use History:   Social History     Substance and Sexual Activity   Alcohol Use Yes    Alcohol/week: 2.0 standard drinks of alcohol    Types: 1 Glasses of wine, 1 Cans of beer per week    Comment: 3 drinks/week     Tobacco Use History[4]  Social History     Substance and Sexual Activity   Drug Use Not Currently    Types: Marijuana, Cocaine, Oxycodone    Comment: snorted cocain about 10 years ago for 1 year       Family History:    non-contributory    Physical Exam:     Vitals:   Blood Pressure: 156/85 (06/26/25 0734)  Pulse: 76 (06/26/25 0734)  Temperature: 97.9 °F (36.6 °C) (06/26/25 0734)  Temp Source: Tympanic (06/26/25 0734)  Respirations: 18 (06/26/25 0734)  Height: 6' (182.9 cm) (06/25/25 1424)  Weight - Scale: 77.8 kg (171 lb 9.6 oz) (06/25/25 1424)  SpO2: 98 % (06/26/25 0734)    Physical Exam  Vitals and nursing note reviewed.   Constitutional:       General: He is not in acute distress.     Appearance: Normal appearance.   HENT:      Head: Normocephalic.      Nose: Nose normal.      Mouth/Throat:      Mouth: Mucous membranes are moist.      Pharynx: Oropharynx is clear.     Eyes:      General: No scleral icterus.     Extraocular Movements: Extraocular movements intact.      Conjunctiva/sclera: Conjunctivae normal.      Pupils: Pupils are equal, round, and reactive to light.       Cardiovascular:      Rate and Rhythm: Normal rate.      Heart sounds: No murmur heard.     No friction rub. No gallop.   Pulmonary:      Effort: Pulmonary effort is normal. No respiratory distress.      Breath sounds: Normal breath sounds. No stridor. No wheezing, rhonchi or rales.   Abdominal:      General: Bowel sounds are normal. There is no distension.       Palpations: Abdomen is soft.      Tenderness: There is no abdominal tenderness. There is no guarding.     Musculoskeletal:         General: No deformity. Normal range of motion.      Cervical back: Neck supple.      Right lower leg: No edema.      Left lower leg: No edema.     Skin:     General: Skin is warm and dry.      Coloration: Skin is not jaundiced.     Neurological:      General: No focal deficit present.      Mental Status: He is alert and oriented to person, place, and time. Mental status is at baseline.      Cranial Nerves: Cranial nerves 2-12 are intact. No cranial nerve deficit.           Additional Data:     Lab Results: I have personally reviewed pertinent reports.                      Lab Results   Component Value Date/Time    HGBA1C 5.0 08/04/2021 05:05 AM               Imaging: I have personally reviewed pertinent reports.      No orders to display       EKG, Pathology, and Other Studies Reviewed on Admission:   Prior pertinent studies and records reviewed in [facility]/Care Everywhere.    ** Please Note: This note has been constructed using a voice recognition system. **         [1]   Past Medical History:  Diagnosis Date    Chronic back pain    [2]   Past Surgical History:  Procedure Laterality Date    WISDOM TOOTH EXTRACTION     [3] No Known Allergies  [4]   Social History  Tobacco Use   Smoking Status Former    Types: Cigars   Smokeless Tobacco Never   Tobacco Comments    has a cigar about 2/month

## 2025-06-26 NOTE — ASSESSMENT & PLAN NOTE
Continue Risperdal 4 mg twice daily  Continue Zoloft 100 mg daily  Continue BuSpar 15 mg twice daily  Continue trazodone 100 mg at bedtime

## 2025-06-26 NOTE — CASE MANAGEMENT
[facility] PARTIAL PROGRAM REFERRAL     [facility]    Name and Date of Birth:  Ibrahima Adams 50 y.o. 1975    Date of Referral: June 26, 2025    Referral Source (Agency/Name): [facility]    Correct Demographics on file:  Yes     Emergency contact on file: Yes     Insurance on file: Yes     _____________________________________________      Presenting Symptoms and Stressors:      Please describe the reason for Referral: Patient was referred prior to coming to the inpatient unit due to severe anxiety.     Stressors: everyday stressors    Symptoms:  anxiety and worsening anxiety    Suicidal Ideation: None at present    Homicidal Ideation: None at present    Depressed Mood: None    Heavenly/Hypomania: None    Psychosis: None    Agitation: No    Appetite Changes: fluctuating appetite    Sleep Disturbance: difficulty sleeping    Access to Weapons:  No    Smoking Status: none    Substance Use:  None at present  If Use : Last use; pt reported last use of marijuana was months ago   If Use: Current SA treatment: n/a    Current Psychiatrist or Therapist:    Psychiatrist: Dr. Radha Hung  Therapist: Dr. Miller Masters- Private    Past Psychiatric Treatment: [facility]     If currently Inpatient - Date of Anticipated discharge: 6/28/25    Diagnoses:  Unspecified Mood Disorder    Do they Require Ambulatory Assistance: No    Communication Assistance: not required     Legal Issues: None        Joanne Chris

## 2025-06-26 NOTE — PROGRESS NOTES
Reviewed Diagnosis of: Principal Problem:    Mood disorder (HCC)  Active Problems:    Functional neurological symptom disorder with attacks or seizures    Anxiety disorder    Substance use    Psychogenic nonepileptic seizure    Hypertension    Medical clearance for psychiatric admission    Reviewed Short Term Goals of: decrease in depressive symptoms, decrease in anxiety symptoms, decrease in level of agitation, improvement in insight, sleep improvement, improvement in appetite, mood stabilization          06/26/25 1241   Team Meeting   Meeting Type Tx Team Meeting   Initial Conference Date 06/26/25   Next Conference Date 07/25/25   Team Members Present   Team Members Present Physician;Nurse;   Physician Team Member Dr. Caraballo   Nursing Team Member Elizabeth   Care Management Team Member Aries   Patient/Family Present   Patient Present Yes   Patient's Family Present No

## 2025-06-26 NOTE — PLAN OF CARE
Problem: DISCHARGE PLANNING  Goal: Discharge to home or other facility with appropriate resources  Description: INTERVENTIONS:  - Identify barriers to discharge w/patient and caregiver  - Arrange for needed discharge resources and transportation as appropriate  - Identify discharge learning needs (meds, wound care, etc.)  - Arrange for interpretive services to assist at discharge as needed  - Refer to Case Management Department for coordinating discharge planning if the patient needs post-hospital services based on physician/advanced practitioner order or complex needs related to functional status, cognitive ability, or social support system  Outcome: Progressing     Problem: BEHAVIOR  Goal: Pt/Family maintain appropriate behavior and adhere to behavioral management agreement, if implemented  Description: INTERVENTIONS:  - Assess the family dynamic   - Encourage verbalization of thoughts and concerns in a socially appropriate manner  - Assess patient/family's coping skills and non-compliant behavior (including use of illegal substances).  - Utilize positive, consistent limit setting strategies supporting safety of patient, staff and others  - Initiate consult with Case Management, Spiritual Care or other ancillary services as appropriate  - If a patient's/visitor's behavior jeopardizes the safety of the patient, staff, or others, refer to organization procedure.   - Notify Security of behavior or suspected illegal substances which indicate the need for search of the patient and/or belongings  - Encourage participation in the decision making process about a behavioral management agreement; implement if patient meets criteria  Outcome: Progressing     Problem: ANXIETY  Goal: By discharge: Patient will verbalize 2 strategies to deal with anxiety  Description: Interventions:  - Identify any obvious source/trigger to anxiety  - Staff will assist patient in applying identified coping technique/skills  - Encourage  attendance of scheduled groups and activities  Outcome: Progressing     Problem: ANXIETY  Goal: Will report anxiety at manageable levels  Description: INTERVENTIONS:  - Administer medication as ordered  - Teach and encourage coping skills  - Provide emotional support  - Assess patient/family for anxiety and ability to cope  Outcome: Not Progressing     Problem: Ineffective Coping  Goal: Participates in unit activities  Description: Interventions:  - Provide therapeutic environment   - Provide required programming   - Redirect inappropriate behaviors   Outcome: Not Progressing

## 2025-06-26 NOTE — CASE MANAGEMENT
Intake    NAME:   Ibrahima Adams  (50 years old) : 1975    ADMISSION DATE:      Tentative DC Date:         Treatment Plan:     Reviewed   Social Drivers: Completed    Confirmed Address:       [facility]:  [address]    [place] [id number]    Resides in the home with:  Wife     Will Return Home at Discharge:     [address]    [place] [id number]    Confirmed Phone Number:  [phone] (M)     Marital Status:   Children:        Family History:                 Parents:           Siblings:  2  Patient reports mother suffered from alcoholism and is .      Patient’s father has no history of mental illness.     Commitment Status:         Status Changes:   201      72 Hour Notice Signed    DC 25    Admitted from:     [facility]     Presenting Problem:           Patient reported having a hard time managing his symptoms and this led to his wife seeking help through the hospital.   As per ED Note:      49 y/o male with history of anxiety and functional neurological disorder presents to the ED via EMS from home for evaluation of severe anxiety and worsening of his functional neurological disorder. The patient's wife assists with providing history. The patient has been experiencing severe anxiety with functional neurological disorder that was diagnosed approximately a year and a half ago. The patient's symptoms include episodes where he becomes very loud and with erratic behavior, displaying Tourette's-like syndrome including loud repetitive cursing, repetitive rubbing of his face frequently and often with both hands to the point that he causes abrasions to his face, and forcibly striking himself in the chest with his closed fist. The patient's wife reports that these symptoms have been worsening over the last several months and have caused the patient to lose 2 jobs. She also reports episodes in the last few weeks where he  "did do dangerous things at home that he \"does not remember\", including trying to turn on the stove but not realizing that the stove did not ignite and instead filling the room with gas, and then also putting rubber/plastic Tupperware lids in the oven and turning the oven on, causing the objects to burn and melts. The patient states that he does not recall these episodes. He denies any SI, HI, or hallucinations. The patient's wife notes that he has been evaluated for the symptoms medically after they started a year and a half ago and he was found to have no organic cause per neurology evaluation. The patient was deemed to have functional neurological disorder and has been following up with psychiatry in the outpatient setting.    Past Inpatient Tx:     1st time     Past Suicide Attempts:  No     Current outpatient:  [facility]    Psychiatrist:      PCP: Charles Rice DO    [phone]  Dr. White     Therapist:   Dr. Miller [facility], [facility] Hx/Concern:         Functional Neurological Disorder with attacks or seizures    Medications:     Buspar 15 mg, Risperdal 4 mg, Zoloft 100 mg, Trazodone 100 mg     Pharmacy:     [address], [phone], [address] [place] [id number]       Work/Income:   [place] Benefits: N/A          Preferred time for appts:  Presently unemployed                Open     Legal:       Probation/[place] Ofc:  No     Access to Firearms:     No     Transportation     Drives     Support System:    Wife/ dad/ siblings/ friends     Referrals Needed:        Pt requested PHP     Transport at Discharge:     Wife can pick him up     IMM:  n/a    EMERGENCY CONTACT:   Morena Barajas (Spouse)        [phone] (M)      POA: n/a   Insurance:      BLUE CROSS - [place] PERSONAL CHOICE       Audit: 0  PAWSS: 0  BAT: 0.000   Ethanol:  UDS: Positive  (Fentanyl)    Substance Abuse Hx:    Freq.  Amount  Last " Use  Notes

## 2025-06-26 NOTE — CMS CERTIFICATION NOTE
Recertification: Based upon physical, mental and social evaluations, I certify that inpatient psychiatric services continue to be medically necessary for this patient for a duration of 3 midnights for the treatment of  Mood disorder (HCC) Available alternative community resources still do not meet the patient's mental health care needs. I further attest that an established written individualized plan of care has been updated and is outlined in the patient's medical records.

## 2025-06-26 NOTE — NURSING NOTE
Pt blood pressure elevated in the evening. [facility] made this writer aware. Reports drinking to much caffeine and stress from wife. Pt showered in the evening. Blood pressure checked manually. Remains elevated. Denies SI/HI or hallucinations. Reports hopeful to leave Saturday and do OP services.

## 2025-06-26 NOTE — NURSING NOTE
Pt calm during interaction. Pt declined going to group therapy, reports having social anxiety. Denies SI/HI or hallucinations. Pt asking to meet with case management regarding filling out unemployment. Pt compliant with medication. Able to express needs. Denies any question or concern at this time.

## 2025-06-26 NOTE — ASSESSMENT & PLAN NOTE
Under 201 status for anxiety, life stress  Resumed home BuSpar, Risperdal, Zoloft, trazodone  Management per psychiatry team

## 2025-06-26 NOTE — H&P
"Psychiatric Evaluation - Behavioral Health   Name: Ibrahima Adams 50 y.o. male I MRN: [id number]  Unit/Bed#: -02 I Date of Admission: 6/25/2025   Date of Service: 6/26/2025 I Hospital Day: 1     Assessment & Plan  Mood disorder (HCC)  Continue Risperdal 4 mg twice daily  Continue Zoloft 100 mg daily  Continue BuSpar 15 mg twice daily  Continue trazodone 100 mg at bedtime  Functional neurological symptom disorder with attacks or seizures  Psychotherapy  Anxiety disorder  Psychotherapy  Substance use  Counseling  Hypertension  As per medicine recommendations  Medical clearance for psychiatric admission  Consult medicine     Admit to [facility] on 201 status for safety and treatment  No 1:1 continuous observation needed at this time, as patient feels safe on the unit.  Check admission labs.  Get collaterals.  Collaborate with family for baseline assessment and disposition planning.  Case discussed with treatment team.  Treatment options and alternatives were reviewed with the patient. Risks, benefits, and possible side effects of medications were explained to the patient. Patient verbalizes understanding and concurs with the above plan.    Chief Complaint: \"I get anxious and angry at times when I am little late on medications\"    History of Present Illness     Per ED provider on 6/24:\"49 y/o male with history of anxiety and functional neurological disorder presents to the ED via EMS from home for evaluation of severe anxiety and worsening of his functional neurological disorder. The patient's wife assists with providing history. The patient has been experiencing severe anxiety with functional neurological disorder that was diagnosed approximately a year and a half ago. The patient's symptoms include episodes where he becomes very loud and with erratic behavior, displaying Tourette's-like syndrome including loud repetitive cursing, repetitive rubbing of his face frequently and often with both hands to " "the point that he causes abrasions to his face, and forcibly striking himself in the chest with his closed fist. The patient's wife reports that these symptoms have been worsening over the last several months and have caused the patient to lose 2 jobs. She also reports episodes in the last few weeks where he did do dangerous things at home that he \"does not remember\", including trying to turn on the stove but not realizing that the stove did not ignite and instead filling the room with gas, and then also putting rubber/plastic Tupperware lids in the oven and turning the oven on, causing the objects to burn and melts. The patient states that he does not recall these episodes. He denies any SI, HI, or hallucinations. The patient's wife notes that he has been evaluated for the symptoms medically after they started a year and a half ago and he was found to have no organic cause per neurology evaluation. The patient was deemed to have functional neurological disorder and has been following up with psychiatry in the outpatient setting. \"    Per Crisis worker on 6/24:\"Patient was at this ED last week with c/o anxiety episodes.  Patient's wife, Morena, wanted patient to get IPBH but patient did not want to go and patient did not meet criteria for 302 per EDMD.  Patient was brought in by EMS called by his wife.  Patient was rubbing his face red and hitting his chest.  He had a difficult time speaking.  Patient's hair was wet from sweat.  Morena reported that the patient has these episodes and  which are increasing in severity and frequency.  Patient sometimes leaves bruises on himself and does not remember the episodes afterward.  Patient has a tendency to down play them and states that he is just anxious.  Patient has reportedly turned the gas on on the stove top and threw plastic into the lit oven during episodes. He did not remember doing these things afterward. Patient has lost his job due to the episodes and is in jeopardy " "of losing the apartment due to his screaming and neighbor complaints.  Morena reported that this started about 1 1/2 years ago and the patient has a psychiatrist and psychologist.  Medication is not working. He reportedly was Dx with Functional Neurological Disorder.  Morena reported that the patient sometimes tries to drive when having an episode.  Patient lacks insight into his condition and does not want IP hospitalization.  Morena reported that over the last year the patient has had medical issues ruled out and doctors believe it is psychological.  Patient has pseudo seizures.  Morena is petitioning a 302.  Patient told [facility] that he was fine and he was just having anxiety.\"    This is 50-year-old male with history of depression/anxiety/functional neurological disorder and substance abuse admitted to inpatient unit on voluntary status for worsening of mood, anxiety and agitation in the context of partial compliance with treatment.  Patient reports periods of increased anxiety, overthinking and agitation \" then I tried to calm myself by touching on my face and patting on my chest.  This happens only when I am late on taking medications.  Endorses fluctuating anxiety.  Denies any paranoia or hallucinations.  Denies any symptoms of shavonne.  Denies any thoughts to hurt himself or others.  Denies any recent drug use. UDS + Fentanyl.  Patient appears calm cooperative pleasant and minimizing.   Psychiatric Review Of Systems:  Medication side effects: none  Sleep: no change  Appetite: no change  Hygiene: able to tend to instrumental and basic ADLs  Anxiety: yes  Psychotic Symptoms: denies  Depression Symptoms: denies  Manic Symptoms: denies  PTSD Symptoms: denies  Obsession/compulsions: Denies  Eating disorders: Denies  Suicidal Thoughts: denies  Homicidal Thoughts: denies    Medical Review Of Systems:   Complete ROS is negative, except as noted above.    Scheduled medications:  Current Facility-Administered Medications "   Medication Dose Route Frequency Provider Last Rate    acetaminophen  650 mg Oral Q6H PRN [place] YENIFER Cannon      acetaminophen  650 mg Oral Q4H PRN [place] YENIFER Cannon      acetaminophen  975 mg Oral Q6H PRN [place]ran YENIFER Cannon      aluminum-magnesium hydroxide-simethicone  30 mL Oral Q4H PRN Eran YENIFER Cannon      haloperidol lactate  2.5 mg Intramuscular Q4H PRN Max 4/day Eran YEINFER Cannon      And    LORazepam  1 mg Intramuscular Q4H PRN Max 4/day Eran YENIFER Cannon      And    benztropine  0.5 mg Intramuscular Q4H PRN Max 4/day Eran YENIFER Cannon      haloperidol lactate  5 mg Intramuscular Q4H PRN Max 4/day Eran YENIFER Cannon      And    LORazepam  2 mg Intramuscular Q4H PRN Max 4/day Eran YENIFER Cannon      And    benztropine  1 mg Intramuscular Q4H PRN Max 4/day Eran YENIFER Cannon      benztropine  1 mg Intramuscular Q4H PRN Max 6/day Eran YENIFER Cannon      benztropine  1 mg Oral Q4H PRN Max 6/day Eran YENIFER Cannon      busPIRone  15 mg Oral BID Eran YENIFER Cannon      haloperidol  1 mg Oral Q6H PRN Eran YENIFER Cannon      haloperidol  2.5 mg Oral Q4H PRN Max 4/day Eran YENIFER Cannon      haloperidol  5 mg Oral Q4H PRN Max 4/day Eran YENIFER Cannon      hydrOXYzine HCL  25 mg Oral Q6H PRN Max 4/day Eran YENIFER Cannon      hydrOXYzine HCL  50 mg Oral Q4H PRN Max 4/day Eran YENIFER Cannon      Or    LORazepam  1 mg Intramuscular Q4H PRN Eran YENIFER Cannon      LORazepam  1 mg Oral Q4H PRN Max 6/day Eran YENIFER Cannon      Or    LORazepam  2 mg Intramuscular Q6H PRN Max 3/day Eran YENIFER Cannon      polyethylene glycol  17 g Oral Daily PRN Eran YENIFER Cannon      propranolol  10 mg Oral Q8H PRN Eran Cannon PA-C      risperiDONE  4 mg Oral BID Eran Cannon PA-C      sertraline  100 mg Oral Daily Eran Cannon PA-C      traZODone  100 mg Oral HS Eran Cannon PA-C      traZODone  50 mg Oral HS PRN Eran Cannon PA-C          PRN:    acetaminophen     acetaminophen    acetaminophen    aluminum-magnesium hydroxide-simethicone    haloperidol lactate **AND** LORazepam **AND** benztropine    haloperidol lactate **AND** LORazepam **AND** benztropine    benztropine    benztropine    haloperidol    haloperidol    haloperidol    hydrOXYzine HCL    hydrOXYzine HCL **OR** LORazepam    LORazepam **OR** LORazepam    polyethylene glycol    propranolol    traZODone    Diet:       Diet Orders   (From admission, onward)                 Start     Ordered    06/25/25 1422  Diet Regular; Regular House  Diet effective now        References:    Adult Nutrition Support Algorithm    RD Therapeutic Diet Order Protocol   Question Answer Comment   Diet Type Regular    Regular Regular House    Allow Registered Dietitian to adjust diet order per protocol Yes        06/25/25 1421                     - Observation: routine            - VS: as per unit protocol  - Legal status: 201  - Psychoeducation (benefits and potential risks) discussed, importance of compliance with the psychiatric treatment reiterated, and the patient verbalized understanding of the matter  - Encourage group attendance and milieu therapy   - The pt was educated and agreed to verbalize any suicidal thoughts, frustrations or concerns to the nursing staff, immediately.   - Dispo: To be determined            Historical Information     Psychiatric History:   Psychiatry diagnosis:anxiety/depression  Inpatient Hx: Yes  Suicidal Hx:Denies  Self harming behavior Hx:Denies  Violent behavior Hx:Denies  Outpatient Hx: Yes  Medications/Trials: Risperdal, buspar, trazodone and zoloft      Substance Abuse History:    Reports hx of substance use. UDS + fentanyl       Social History     Substance and Sexual Activity   Alcohol Use Yes    Alcohol/week: 2.0 standard drinks of alcohol    Types: 1 Glasses of wine, 1 Cans of beer per week    Comment: 3 drinks/week     Social History     Substance and Sexual Activity   Drug Use Not Currently     Types: Marijuana, Cocaine, Oxycodone    Comment: snorted cocain about 10 years ago for 1 year       Family Psychiatric History:   Family History[1]    Social History:  Social History     Socioeconomic History    Marital status: /Civil Union     Spouse name: Not on file    Number of children: Not on file    Years of education: Not on file    Highest education level: Not on file   Occupational History    Not on file   Tobacco Use    Smoking status: Former     Types: Cigars    Smokeless tobacco: Never    Tobacco comments:     has a cigar about 2/month   Vaping Use    Vaping status: Never Used   Substance and Sexual Activity    Alcohol use: Yes     Alcohol/week: 2.0 standard drinks of alcohol     Types: 1 Glasses of wine, 1 Cans of beer per week     Comment: 3 drinks/week    Drug use: Not Currently     Types: Marijuana, Cocaine, Oxycodone     Comment: snorted cocain about 10 years ago for 1 year    Sexual activity: Yes     Partners: Female   Other Topics Concern    Not on file   Social History Narrative    Not on file     Social Drivers of Health     Financial Resource Strain: Not on file   Food Insecurity: No Food Insecurity (6/26/2025)    Nursing - Inadequate Food Risk Classification     Worried About Running Out of Food in the Last Year: Never true     Ran Out of Food in the Last Year: Never true     Ran Out of Food in the Last Year: Never true   Transportation Needs: No Transportation Needs (6/26/2025)    PRAPARE - Transportation     Lack of Transportation (Medical): No     Lack of Transportation (Non-Medical): No   Physical Activity: Not on file   Stress: Not on file   Social Connections: Not on file   Intimate Partner Violence: Not At Risk (6/26/2025)    Humiliation, Afraid, Rape, and Kick questionnaire     Fear of Current or Ex-Partner: No     Emotionally Abused: No     Physically Abused: No     Sexually Abused: No   Housing Stability: Low Risk  (6/26/2025)    Housing Stability Vital Sign     Unable to Pay  for Housing in the Last Year: No     Number of Times Moved in the Last Year: 0     Homeless in the Last Year: No       Traumatic History:   Abuse:Denies  Other Traumatic Events: None    Past Medical History[2]    Medical Review Of Systems:  Pertinent items are noted in HPI; all others negative    Meds/Allergies   all current active meds have been reviewed  Allergies[3]    Objective      Mental Status Evaluation:  Appearance and attitude: appeared as stated age, cooperative and attentive, dressed in hospital attire, with good hygiene  Eye contact: fair  Motor Function: within normal limits, intact gait, No PMA/PMR  Gait/station: normal gait/station  Speech: normal for rate, rhythm, volume, latency, amount  Language: No overt abnormality  Mood/affect: anxious / Affect was euthymic, reactive, in full range, normal intensity and mood congruent  Thought Processes: sequential and goal-directed, logical, coherent, organized  Thought content: denies suicidal ideation or homicidal ideation; no delusions or first rank symptoms  Associations: intact associations  Perceptual disturbances: denies Auditory/Visual/Tactile Hallucinations  Orientation: oriented to time, person, place and to the situational context  Cognitive Function: intact  Memory: recent and remote memory grossly intact  Intellect: average  Fund of knowledge: aware of current events, aware of past history, and vocabulary average  Impulse control: fair  Insight/judgment: fair/fair      Lab results: I have personally reviewed all pertinent laboratory/tests results  Most Recent Labs:   Lab Results   Component Value Date    WBC 5.40 06/26/2025    RBC 4.62 06/26/2025    HGB 14.3 06/26/2025    HCT 41.8 06/26/2025     06/26/2025    RDW 12.7 06/26/2025    NEUTROABS 3.36 06/26/2025    SODIUM 141 06/26/2025    K 4.1 06/26/2025     06/26/2025    CO2 30 06/26/2025    BUN 21 06/26/2025    CREATININE 1.08 06/26/2025    GLUC 82 06/26/2025    CALCIUM 9.6 06/26/2025     AST 11 (L) 06/26/2025    ALT 9 06/26/2025    ALKPHOS 52 06/26/2025    TP 6.7 06/26/2025    ALB 4.3 06/26/2025    TBILI 0.49 06/26/2025    CHOLESTEROL 183 06/26/2025    HDL 45 06/26/2025    TRIG 181 (H) 06/26/2025    LDLCALC 102 (H) 06/26/2025    NONHDLC 138 06/26/2025    AMMONIA 52 02/09/2024    PTC9BYDBEAJC 1.453 06/26/2025    SYPHILISAB Non-reactive 02/01/2024    HGBA1C 5.0 08/04/2021    EAG 97 08/04/2021       Imaging Studies: No results found.    EKG, Pathology, and Other Studies: Reviewed.    Advance Directive and Living Will: <no information>    Patient Strengths/Assets: cooperative, motivation for treatment/growth, patient is on a voluntary commitment    Patient Barriers/Limitations: financial instability, limited support system    Suicide/Homicide Risk Assessment:    Risk of Harm to Self:   Nursing Suicide Risk Assessment Last 24 hours: C-SSRS Risk (Since Last Contact)  Calculated C-SSRS Risk Score (Since Last Contact): No Risk Indicated    Risk of Harm to Others:  Nursing Homicide Risk Assessment: Violence Risk to Others: Denies within past 6 months    The following interventions are recommended: Behavioral Health checks for safety monitoring, continued hospitalization on locked unit    Counseling / Coordination of Care:    Patient's presentation on admission and proposed treatment plan discussed with treatment team.  Diagnosis, medication changes and treatment plan reviewed with patient.  Events leading to admission reviewed with patient.  Outpatient follow up discussed with patient.  Supportive therapy provided to patient.    Inpatient Psychiatric Certification:    Estimated length of stay: 3 midnights          This note was completed in part utilizing Dragon dictation Software. Grammatical, translation, syntax errors, random word insertions, spelling mistakes, and incomplete sentences may be an occasional consequence of this system secondary to software limitations with voice recognition, ambient  noise, and hardware issues. If you have any questions or concerns about the content, text, or information contained within the body of this dictation, please contact the provider for clarification.        [1]   Family History  Problem Relation Name Age of Onset    Cirrhosis Mother      Cholelithiasis Mother      Diabetes type II Father      Stroke Maternal Grandfather      Diabetes Paternal Grandfather     [2]   Past Medical History:  Diagnosis Date    Chronic back pain    [3] No Known Allergies

## 2025-06-26 NOTE — ASSESSMENT & PLAN NOTE
Patient is medically stable to continue inpatient psychiatric treatment.  Contact [facility] with any questions or concerns.

## 2025-06-26 NOTE — PROGRESS NOTES
06/26/25 0833   Team Meeting   Meeting Type Daily Rounds   Team Members Present   Team Members Present Physician;Nurse;;Other (Discipline and Name)   Physician Team Member Dr. Caraballo / YENIFER Cannon / YENIFER Ahumada   Nursing Team Member Zeke / Marck   Care Management Team Member Moe / Aisha   Other (Discipline and Name) Sigmund - Group Facilitator   Patient/Family Present   Patient Present No   Patient's Family Present No   Pt was originally a 302 by his wife but signed a 201. Increased anxiety. First inpatient. Pt reportedly yelling which is out of character for him. Recent diagnosis of functional neurological disorder.     DC: Saturday - pt signed 72 hour notice on 6/25/25 @ 3763

## 2025-06-26 NOTE — NURSING NOTE
"Wife called and expressed having concerns. Ibrahima gave this writer verbal permission to speak with wife. Asking case management and provider to call pt tomorrow. Wife worried 72 hour notice will be to early to discharge. Expressed having medical questions. Wife worried UDS(+) Fentanyl. Reports history of substance use history. Wife reports finding empty capsules with residue and plastic straws cut short in drawer. States finding medication after \"these episodes\". On the 17th in the emergency room, pill bag was found on pt. Police were called. Unable to identify pills. Wife said Ibrahima said it was vitamins and pre-workout. Wife reports calling the police today and police state unable to identify medication. Wife concerned pt is using substances again. States \"there has been a lot of red flags I can't ignore\". Wife concerned with leaving  home alone d/t having these \"episodes\".    "

## 2025-06-26 NOTE — TREATMENT PLAN
TREATMENT PLAN REVIEW - Behavioral Health Ibrahima Adams 50 y.o. 1975 male MRN: [id number]    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    Anxiety disorder    Substance use    Psychogenic nonepileptic seizure    Hypertension    Medical clearance for psychiatric admission      Patient Strengths/Assets: cooperative, motivation for treatment/growth, patient is on a voluntary commitment    Patient Barriers/Limitations: chronic mental illness, limited support system    Short Term Goals: decrease in depressive symptoms, decrease in anxiety symptoms, decrease in level of agitation, improvement in insight, sleep improvement, improvement in appetite, mood stabilization    Long Term Goals: improvement in depression, improvement in anxiety, resolution of depressive symptoms, stabilization of mood, free of suicidal thoughts, improved insight, acceptance of need for psychiatric medications, acceptance of need for psychiatric treatment, adequate self care    Progress Towards Goals: starting psychiatric medications as prescribed, improving, attends groups, mood is stabilizing    Recommended Treatment: medication management, patient medication education, group therapy, milieu therapy, continued Behavioral Health psychiatric evaluation/assessment process    Treatment Frequency: daily medication monitoring, group and milieu therapy daily, monitoring through interdisciplinary rounds, monitoring through weekly patient care conferences    Expected Discharge Date:  3 days    Discharge Plan: referral for outpatient  medication management with a psychiatrist, referral for outpatient psychotherapy    Treatment Plan Created/Updated By: Arturo Caraballo MD

## 2025-06-26 NOTE — CASE MANAGEMENT
CM met with patient to confirm patient's interest in the [facility] PHP. Patient confirmed that he was still interested. As per chart review, patient was waiting to be scheduled. CM reached out to [facility] via [employer] to obtain appt

## 2025-06-27 LAB
ATRIAL RATE: 49 BPM
P AXIS: 42 DEGREES
PR INTERVAL: 148 MS
QRS AXIS: 43 DEGREES
QRSD INTERVAL: 96 MS
QT INTERVAL: 472 MS
QTC INTERVAL: 426 MS
T WAVE AXIS: 45 DEGREES
VENTRICULAR RATE: 49 BPM

## 2025-06-27 PROCEDURE — 93010 ELECTROCARDIOGRAM REPORT: CPT | Performed by: INTERNAL MEDICINE

## 2025-06-27 PROCEDURE — 99232 SBSQ HOSP IP/OBS MODERATE 35: CPT | Performed by: STUDENT IN AN ORGANIZED HEALTH CARE EDUCATION/TRAINING PROGRAM

## 2025-06-27 RX ADMIN — BUSPIRONE HYDROCHLORIDE 15 MG: 15 TABLET ORAL at 17:12

## 2025-06-27 RX ADMIN — RISPERIDONE 4 MG: 2 TABLET, FILM COATED ORAL at 17:11

## 2025-06-27 RX ADMIN — TRAZODONE HYDROCHLORIDE 100 MG: 100 TABLET ORAL at 21:04

## 2025-06-27 RX ADMIN — BUSPIRONE HYDROCHLORIDE 15 MG: 15 TABLET ORAL at 08:47

## 2025-06-27 RX ADMIN — RISPERIDONE 4 MG: 2 TABLET, FILM COATED ORAL at 08:47

## 2025-06-27 RX ADMIN — LORAZEPAM 1 MG: 1 TABLET ORAL at 17:51

## 2025-06-27 RX ADMIN — SERTRALINE 100 MG: 100 TABLET, FILM COATED ORAL at 08:47

## 2025-06-27 RX ADMIN — PROPRANOLOL HYDROCHLORIDE 10 MG: 10 TABLET ORAL at 15:43

## 2025-06-27 NOTE — NURSING NOTE
@ 6754 pt requested and received ativan 1 mg PO for severe anxiety. [place] 25. Upon follow up pt reports PRN was effective.

## 2025-06-27 NOTE — CASE MANAGEMENT
CM called patient's wife, Morena, and discussed concerns. Patient's wife expressed concerns with patient's current lab work and the positive UDS. Patient discussed with his wife his results. Patient's wife shared that patient has a history of substance abuse. Patient's wife reports that patient's past use was Fentanyl and some of the current behaviors she has observed are similar to when he used Fentanyl in the past.    Patient's wife has addressed her suspicions with patient, and when she has found empty pill capsules in the home. Patient has denied usage every time wife has addressed him.   Patient's wife reports that patient's behaviors have impacted his employment and patient has been let go. There has also been issues with their apartment building and the possibility of them being evicted due to patient's behaviors.     Patient's wife believes that patient could benefit from long term inpatient care. CM will speak with patient regarding inpatient rehabilitation program.

## 2025-06-27 NOTE — PROGRESS NOTES
Progress Note - Behavioral Health   Name: Ibrahima Adams 50 y.o. 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   Risks, benefits and possible side effects of Medications:   Risks, benefits, alternatives, and possible side effects of patient's psychiatric medications were discussed with patient.    Progress Toward Goals: Progressing. Patient is not at goal. They are not yet ready for discharge. The patient's condition currently requires active psychopharmacological medication management, interdisciplinary coordination with case management, and the utilization of adjunctive milieu and group therapy to augment psychopharmacological efficacy. The patient's risk of morbidity, and progression or decompensation of psychiatric disease, is higher without this current treatment.    Subjective: Patient was seen, chart was reviewed, and case was discussed with the team.  As per report no behavioral issues on the unit.  Patient endorses mild anxiety at times.  Denies any irritability or paranoia or agitation.  Reports improvement in mood since he is more compliant with medications 1 time.  Denies any recent drugs use during to this hospitalization.  Denies any thoughts of hurting self or others.  Reports that he feels ready for tomorrow's discharge plan and not interested in rescinding 72 hour notice.     Patient  is compliant with medications. Patient denied adverse effects to their current psychiatric medication regimen. Discussed the importance of continuing to take medications as prescribed, as well as the importance of continuing to attend groups on the unit.    Behavior over the last 24 hours:  slowly improving  Sleep: normal  Appetite: normal  Medication side effects: No    Medical ROS: Pertinent items are noted in HPI.all other systems are negative      Scheduled medications:  Current Facility-Administered Medications   Medication Dose Route Frequency Provider Last Rate    acetaminophen  650 mg Oral Q6H PRN Eran Kassandra, PA-C      acetaminophen  650 mg Oral Q4H PRN Eran Kassandra, PA-C      acetaminophen  975 mg Oral Q6H PRN Eran Kassandra, PA-C      aluminum-magnesium hydroxide-simethicone  30 mL Oral Q4H PRN Eran Kassandra, PA-C      haloperidol lactate  2.5 mg Intramuscular Q4H PRN Max 4/day Eran AGUILAR Cannon-C      And    LORazepam  1 mg Intramuscular Q4H PRN Max 4/day Eran AGUILAR Cannon-C      And    benztropine  0.5 mg Intramuscular Q4H PRN Max 4/day Eran Kassandra, PA-C      haloperidol lactate  5 mg Intramuscular Q4H PRN Max 4/day Eran Kassandra PA-C      And    LORazepam  2 mg Intramuscular Q4H PRN Max 4/day Eran Kassandra PA-C      And    benztropine  1 mg Intramuscular Q4H PRN Max 4/day Eran Kassandra, PA-C      benztropine  1 mg Intramuscular Q4H PRN Max 6/day Eran Kassandra, PA-C      benztropine  1 mg Oral Q4H PRN Max 6/day Eran Kassandra, PA-C      busPIRone  15 mg Oral BID Eran Kassandra, PA-C      haloperidol  1 mg Oral Q6H PRN Eran Kassandra, PA-C      haloperidol  2.5 mg Oral Q4H PRN Max 4/day Eran Kassandra, PA-C      haloperidol  5 mg Oral Q4H PRN Max 4/day Eran Kassandra, PA-C      hydrOXYzine HCL  25 mg Oral Q6H PRN Max 4/day Eran Kassandra, PA-C      hydrOXYzine HCL  50 mg Oral Q4H PRN Max 4/day Eran AGUILAR Cannon-C      Or    LORazepam  1 mg Intramuscular Q4H PRN Eran Cannon PA-C       LORazepam  1 mg Oral Q4H PRN Max 6/day Eran Cannon PA-C      Or    LORazepam  2 mg Intramuscular Q6H PRN Max 3/day Eran Cannon PA-C      polyethylene glycol  17 g Oral Daily PRN Eran Cannon PA-C      propranolol  10 mg Oral Q8H PRN Eran Cannon PA-C      risperiDONE  4 mg Oral BID Eran Cannon PA-C      sertraline  100 mg Oral Daily Eran Cannon PA-C      traZODone  100 mg Oral HS Eran Cannon PA-C      traZODone  50 mg Oral HS PRN Eran Cannon PA-C        PRN:    acetaminophen    acetaminophen    acetaminophen    aluminum-magnesium hydroxide-simethicone    haloperidol lactate **AND** LORazepam **AND** benztropine    haloperidol lactate **AND** LORazepam **AND** benztropine    benztropine    benztropine    haloperidol    haloperidol    haloperidol    hydrOXYzine HCL    hydrOXYzine HCL **OR** LORazepam    LORazepam **OR** LORazepam    polyethylene glycol    propranolol    traZODone    - Observation: routine            - VS: as per unit protocol  - Legal status: 201  - Diet: Regular diet  - Encourage group attendance and milieu therapy  - Dispo: To be determined        Objective    Current Mental Status Evaluation:  Appearance and attitude: appeared as stated age, dressed in hospital attire, with good hygiene  Eye contact: fair  Motor Function: within normal limits, intact gait, No PMA/PMR  Gait/station: normal gait/station  Speech: normal for rate, rhythm, volume, latency, amount  Language: No overt abnormality  Mood/affect: anxious / Affect was euthymic, reactive, in full range, normal intensity and mood congruent  Thought Processes: sequential and goal-directed, logical, coherent, organized  Thought content: denies suicidal ideation or homicidal ideation; no delusions or first rank symptoms  Associations: intact associations  Perceptual disturbances: denies Auditory/Visual/Tactile Hallucinations  Orientation: oriented to time, person, place and to the situational context  Cognitive  Function: intact  Memory: recent and remote memory grossly intact  Intellect: average  Fund of knowledge: aware of current events, aware of past history, and vocabulary average  Impulse control: fair  Insight/judgment: fair/fair      Vital signs in last 24 hours:    Temp:  [97.5 °F (36.4 °C)-98.1 °F (36.7 °C)] 97.5 °F (36.4 °C)  HR:  [77-83] 77  BP: (150-169)/(86-98) 150/86  Resp:  [16-18] 16  SpO2:  [100 %] 100 %  O2 Device: None (Room air)    Lab Results: I have reviewed the following results:   Most Recent Labs:   Lab Results   Component Value Date    WBC 5.40 06/26/2025    RBC 4.62 06/26/2025    HGB 14.3 06/26/2025    HCT 41.8 06/26/2025     06/26/2025    RDW 12.7 06/26/2025    NEUTROABS 3.36 06/26/2025    SODIUM 141 06/26/2025    K 4.1 06/26/2025     06/26/2025    CO2 30 06/26/2025    BUN 21 06/26/2025    CREATININE 1.08 06/26/2025    GLUC 82 06/26/2025    GLUF 82 06/26/2025    CALCIUM 9.6 06/26/2025    AST 11 (L) 06/26/2025    ALT 9 06/26/2025    ALKPHOS 52 06/26/2025    TP 6.7 06/26/2025    ALB 4.3 06/26/2025    TBILI 0.49 06/26/2025    CHOLESTEROL 183 06/26/2025    HDL 45 06/26/2025    TRIG 181 (H) 06/26/2025    LDLCALC 102 (H) 06/26/2025    NONHDLC 138 06/26/2025    AMMONIA 52 02/09/2024    FZX0PJYCZQEC 1.453 06/26/2025    HGBA1C 5.1 06/26/2025     06/26/2025       Counseling / Coordination of Care  Patient's progress discussed with staff in treatment team meeting.  Medications, treatment progress and treatment plan reviewed with patient.  Medication education provided to patient.  Supportive therapy provided to patient.  Cognitive techniques utilized during the session.  Reassurance and supportive therapy provided.  Encouraged participation in milieu and group therapy on the unit.  Crisis/safety plan discussed with patient.        This note was completed in part utilizing Dragon dictation Software. Grammatical, translation, syntax errors, random word insertions, spelling mistakes, and  incomplete sentences may be an occasional consequence of this system secondary to software limitations with voice recognition, ambient noise, and hardware issues. If you have any questions or concerns about the content, text, or information contained within the body of this dictation, please contact the provider for clarification.

## 2025-06-27 NOTE — NURSING NOTE
PT is awake, sitting at bedside desk, completing menu selections. Pt is guarded, but pleasant during interaction. Reports moderate anxiety, depression, but states this is normal and manageable. Denies SI, HI, AVH. Denies questions or concerns regarding treatment or medications at this time.

## 2025-06-27 NOTE — CASE MANAGEMENT
Patient has been scheduled start PHP at [facility] on 7/7 Monday - reporting time 8.15 AM and will start the group on the same day after the intakes.     Tuesday onwards groups 8.30 to 2.30.     ADDRESS: [address] [place] [address], [place] [address], [place] [address]. TELE: [phone]

## 2025-06-27 NOTE — NURSING NOTE
PRN propanolol appears to be helpful related to restless caused by anxiety. Pt BP is slightly improved as well. 153/73

## 2025-06-27 NOTE — DISCHARGE INSTR - OTHER ORDERS
Crisis Intervention services are available 24 hours a day by calling [phone]. The crisis unit is located at [address] [place] [address]. Services include telephone counseling, mobile crisis, walk-in crisis, and assistance in accessing inpatient care and short-term crisis residential services.      The PEER LINE is a toll-free phone number for people in [facility] who are seeking a listening ear for additional support in their recovery from mental illness. The PEER LINE is peer-run and peer-friendly. Callers to the PEER LINE will speak directly to other individuals who have experienced the mental health recovery process.   Hours of operation: 8:30 am - 4:30 pm, Monday through Friday    [phone] ([phone])    Recovery [facility]   [address]   [address]   [place] [address]      Text CONNECT to 254636 from anywhere in the [place], anytime, about any type of crisis.  A live, trained Crisis Counselor receives the text and lets you know that they are here to listen.  The volunteer Crisis Counselor will help you move from a hot moment to a cool moment.      Warm Line: [phone], [phone], [phone]   If it is not quite a crisis, but you want to talk to someone, 24 hours/day, 7 days/week:   Someone to listen; someone who cares.     Dial 2-1-1 to get connected/get help.  Free, confidential information & referral available 24/7: Aging Services, Child & Youth Services, Counseling, Education/Training, Food/Shelter/Clothing, Health Services, Parenting, Substance Abuse, Support Groups, Volunteer Opportunities, & much more.   Phone: 2-1-1 or [phone], Web: [email or web address], Email: [email or web address]      The Drop-In Centers are open to all mental health consumers in [facility] who are interested in meeting people and making new friends. They provide a friendly social atmosphere with scheduled daily activities including games, arts & crafts, discussion and education  groups, vocational activities, and much more. Light refreshments are served daily. The locations are:      [facility] Drop-In Center - operated by [facility]    [address] [address]    Phone: [phone]    Fax: [phone]    E-mail: [email or web address]   Hours of Operation:   Monday 3:00 PM - 8:00 PM   Tuesday 12:00 PM - 8:00 PM    Wednesday 3:00 PM - 8:00 PM   Thursday 12:00 PM - 8:00 PM    Friday 3:00 PM - 8:00 PM    Saturday [facility] Drop-In Center - operated by [facility] [address] [place] [address]   Phone: [phone]   Fax: [phone]   Hours of Operation   Monday 12:00 PM - 8:00 PM   Tuesday 12:00 PM - 8:00 PM   Wednesday 12:00 PM - 8:00 PM?   Thursday 12:00 PM - 8:00 PM   Friday 12:00 PM - 9:00 PM?   Saturday 11:00 AM - 4:00 PM   Van transportation is available on scheduled days for transportation.      Psych Rehab Program:   Modeled after the [place] SPO program in [facility], the [employer] movement offers consumers interested in fulfilling work a guaranteed place to come, to belong, and to enjoy meaningful relationships as they seek the confidence and skills necessary to lead vocationally productive and socially satisfying lives. Here is their contact information:   [facility]   [address] [place] [address]   Phone: [phone]   [email or web address]   This site is open Monday thru Thursday from 8:30 am till 4:00 pm and Fridays from 8:30 am till 3:00 pm. Consumers are encouraged to stop by for a visit. After-hour social activities are also scheduled.      Support & Referral Helpline   Support and Referral Helpline is a 24-hour, 7 days-a-week, listening and referral service provided to [facility].   Please call [phone] or tty [phone] to speak with one of our specialists.   The helpline is possible through partnerships with the  [facility] of Human Services and [place] for [facility] Resources.      The 988 Suicide & Crisis Lifeline (formerly known as the National Suicide Prevention Lifeline) provides free and confidential emotional support to people in suicidal crisis or emotional distress 24 hours a day, 7 days a week, across the [place]. The Lifeline is comprised of a national network of over 200 local crisis centers, combining custom local care and resources with national standards and best practices.

## 2025-06-27 NOTE — DISCHARGE INSTR - APPOINTMENTS
You are being discharged to your confirmed address of [address] [place] [address], [facility] [address].       Behavioral Health Nurse Navigator, Cheryl or Cinda will be calling you after your discharge, on the phone number that you provided.  They will be available as an additional support, if needed.   If you wish to speak with Cheryl, you may contact her at [phone].

## 2025-06-27 NOTE — NURSING NOTE
Pt is visible on unit, walking halls with roommate and other peers. Pt reports elevated anxiety, BP was elevated. PRN propanolol 10mg po given at 1543 for HTN and restless r/t anxiety. Pt attended dinner, called wife to confirm D/C  time. Morena, wife, aware that pt must be picked up by 1400 due to 72hr.  Denies SI, HI, AVH. Denies questions or concerns related to medications.

## 2025-06-28 VITALS
HEIGHT: 72 IN | OXYGEN SATURATION: 100 % | RESPIRATION RATE: 17 BRPM | HEART RATE: 112 BPM | TEMPERATURE: 97.7 F | SYSTOLIC BLOOD PRESSURE: 133 MMHG | WEIGHT: 171.6 LBS | DIASTOLIC BLOOD PRESSURE: 91 MMHG | BODY MASS INDEX: 23.24 KG/M2

## 2025-06-28 PROCEDURE — 99239 HOSP IP/OBS DSCHRG MGMT >30: CPT | Performed by: STUDENT IN AN ORGANIZED HEALTH CARE EDUCATION/TRAINING PROGRAM

## 2025-06-28 RX ADMIN — RISPERIDONE 4 MG: 2 TABLET, FILM COATED ORAL at 08:49

## 2025-06-28 RX ADMIN — SERTRALINE 100 MG: 100 TABLET, FILM COATED ORAL at 08:49

## 2025-06-28 RX ADMIN — BUSPIRONE HYDROCHLORIDE 15 MG: 15 TABLET ORAL at 08:49

## 2025-06-28 RX ADMIN — PROPRANOLOL HYDROCHLORIDE 10 MG: 10 TABLET ORAL at 09:54

## 2025-06-28 NOTE — DISCHARGE SUMMARY
"Discharge Summary -  Behavioral Health   Name: Ibrahima Adams 50 y.o. 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    Attending Psychiatrist: Arturo Osorio*    As per H&P on 6/26:\"Per ED provider on 6/24:\"49 y/o male with history of anxiety and functional neurological disorder presents to the ED via EMS from home for evaluation of severe anxiety and worsening of his functional neurological disorder. The patient's wife assists with providing history. The patient has been experiencing severe anxiety with functional neurological disorder that was diagnosed approximately a year and a half ago. The patient's symptoms include episodes where he becomes very loud and with erratic behavior, displaying Tourette's-like syndrome including loud repetitive cursing, repetitive rubbing of his face frequently and often with both hands to the point that he causes abrasions to his face, and forcibly striking himself in the chest with his closed fist. The patient's wife reports that these symptoms have been worsening over the last several months and have caused the patient to lose 2 jobs. She also reports episodes in the last few weeks where he did do dangerous things at home that he \"does not remember\", including trying to turn on the stove but not realizing that the stove did not ignite and instead filling the room with " "gas, and then also putting rubber/plastic Tupperware lids in the oven and turning the oven on, causing the objects to burn and melts. The patient states that he does not recall these episodes. He denies any SI, HI, or hallucinations. The patient's wife notes that he has been evaluated for the symptoms medically after they started a year and a half ago and he was found to have no organic cause per neurology evaluation. The patient was deemed to have functional neurological disorder and has been following up with psychiatry in the outpatient setting. \"     Per Crisis worker on 6/24:\"Patient was at this ED last week with c/o anxiety episodes.  Patient's wife, Morena, wanted patient to get IPBH but patient did not want to go and patient did not meet criteria for 302 per EDMD.  Patient was brought in by EMS called by his wife.  Patient was rubbing his face red and hitting his chest.  He had a difficult time speaking.  Patient's hair was wet from sweat.  Morena reported that the patient has these episodes and  which are increasing in severity and frequency.  Patient sometimes leaves bruises on himself and does not remember the episodes afterward.  Patient has a tendency to down play them and states that he is just anxious.  Patient has reportedly turned the gas on on the stove top and threw plastic into the lit oven during episodes. He did not remember doing these things afterward. Patient has lost his job due to the episodes and is in jeopardy of losing the apartment due to his screaming and neighbor complaints.  Morena reported that this started about 1 1/2 years ago and the patient has a psychiatrist and psychologist.  Medication is not working. He reportedly was Dx with Functional Neurological Disorder.  Morena reported that the patient sometimes tries to drive when having an episode.  Patient lacks insight into his condition and does not want IP hospitalization.  Morena reported that over the last year the patient has had " "medical issues ruled out and doctors believe it is psychological.  Patient has pseudo seizures.  Morena is petitioning a 302.  Patient told [facility] that he was fine and he was just having anxiety.\"     This is 50-year-old male with history of depression/anxiety/functional neurological disorder and substance abuse admitted to inpatient unit on voluntary status for worsening of mood, anxiety and agitation in the context of partial compliance with treatment.  Patient reports periods of increased anxiety, overthinking and agitation \" then I tried to calm myself by touching on my face and patting on my chest.  This happens only when I am late on taking medications.  Endorses fluctuating anxiety.  Denies any paranoia or hallucinations.  Denies any symptoms of shavonne.  Denies any thoughts to hurt himself or others.  Denies any recent drug use. UDS + Fentanyl.  Patient appears calm cooperative pleasant and minimizing.   Psychiatric Review Of Systems:  Medication side effects: none  Sleep: no change  Appetite: no change  Hygiene: able to tend to instrumental and basic ADLs  Anxiety: yes  Psychotic Symptoms: denies  Depression Symptoms: denies  Manic Symptoms: denies  PTSD Symptoms: denies  Obsession/compulsions: Denies  Eating disorders: Denies  Suicidal Thoughts: denies  Homicidal Thoughts: denies     Medical Review Of Systems:   Complete ROS is negative, except as noted above.\"        Past Medical History[1]  Past Surgical History[2]    Medications:    All current active medications have been reviewed.    Allergies:     Allergies[3]    Objective     Vital signs in last 24 hours:    Temp:  [97.7 °F (36.5 °C)-98.6 °F (37 °C)] 97.7 °F (36.5 °C)  HR:  [63-80] 75  BP: (139-161)/(73-98) 139/80  Resp:  [16-17] 17  SpO2:  [100 %] 100 %  O2 Device: None (Room air)    No intake or output data in the 24 hours ending 06/28/25 0905    Hospital Course:     Patient was admitted to the inpatient psychiatric unit and started on Behavioral Health " checks every 7 minutes. During the hospitalization patient was encouraged to attend individual therapy, group therapy, milieu therapy and occupational therapy.     Psychiatric medications were restarted during the hospital stay. To address mood symptoms, patient was treated with Risperdal, Zoloft, BuSpar and trazodone. Prior to beginning of treatment medications risks and benefits and possible side effects were reviewed. Patient verbalized understanding and agreement for treatment. Upon admission patient was seen by medical service for medical clearance for inpatient treatment and medical follow up.     Patient's symptoms resolved over the hospital course. With adjustment of medications and therapeutic milieu, patient's symptoms were resolved. At the end of treatment patient was improved and mood was more stable at the time of discharge. Patient denied suicidal ideation, intent or plan at the time of discharge and denied homicidal ideation, intent or plan at the time of discharge. There was no overt psychosis at the time of discharge. Patient was participating appropriately in milieu at the time of discharge. Behavior was appropriate on the unit at the time of discharge. Sleep and appetite were improved. Patient was tolerating medications and was not reporting any significant side effects at the time of discharge.     Patient signed 72 hour notice, voluntarily withdrawing from treatment. Despite the patient's brief stay on the unit, there were no grounds for involuntary commitment as he was not endorsing any suicidality, there was no homicidal ideation, and there was no overt psychosis. Additionally, the patient was taking his medications, eating meals and generally tending to self-care appropriately. Patient states that he is motivated to continue his aftercare via outpatient follow-up.    Since patient was doing well at the end of the hospitalization, treatment team felt that patient could be safely discharged to  "outpatient care. Patient also felt stable and ready for discharge at the end of the hospital stay.       Mental Status at Time of Discharge:       Appearance:  age appropriate   Behavior:  pleasant, cooperative, calm   Speech:  normal rate and volume, clear, coherent   Mood:  \"Ok\"   Affect:  normal range and intensity, appropriate   Thought Process:  organized, logical, coherent, goal directed, linear, normal rate of thoughts   Associations: intact associations   Thought Content:  no overt delusions   Perceptual Disturbances: no auditory hallucinations, no visual hallucinations   Risk Potential: Suicidal ideation - None  Homicidal ideation - None  Potential for aggression - No   Sensorium:  oriented to person, place, and time/date   Memory:  recent and remote memory grossly intact   Consciousness:  alert and awake   Attention/Concentration: attention span and concentration are age appropriate   Insight:  fair   Judgment: fair   Gait/Station: normal gait/station   Motor Activity: no abnormal movements         Suicide/Homicide Risk Assessment:    Risk of Harm to Self:   Based on today's assessment, Ibrahima presents the following risk of harm to self: none    Risk of Harm to Others:  Based on today's assessment, Ibrahima presents the following risk of harm to others: none    The following interventions are recommended: outpatient follow up with a psychiatrist, outpatient follow up with a therapist    Laboratory Results: I have personally reviewed all pertinent laboratory/tests results    Most Recent Labs:   Lab Results   Component Value Date    WBC 5.40 06/26/2025    RBC 4.62 06/26/2025    HGB 14.3 06/26/2025    HCT 41.8 06/26/2025     06/26/2025    RDW 12.7 06/26/2025    NEUTROABS 3.36 06/26/2025    SODIUM 141 06/26/2025    K 4.1 06/26/2025     06/26/2025    CO2 30 06/26/2025    BUN 21 06/26/2025    CREATININE 1.08 06/26/2025    GLUC 82 06/26/2025    CALCIUM 9.6 06/26/2025    AST 11 (L) 06/26/2025    ALT 9 " 06/26/2025    ALKPHOS 52 06/26/2025    TP 6.7 06/26/2025    ALB 4.3 06/26/2025    TBILI 0.49 06/26/2025    CHOLESTEROL 183 06/26/2025    HDL 45 06/26/2025    TRIG 181 (H) 06/26/2025    LDLCALC 102 (H) 06/26/2025    NONHDLC 138 06/26/2025    AMMONIA 52 02/09/2024    QBR7SPVYGBRE 1.453 06/26/2025    SYPHILISAB Non-reactive 02/01/2024    HGBA1C 5.1 06/26/2025     06/26/2025       Assessment/Plan:    Admission Diagnosis:    Principal Problem:    Mood disorder (HCC)  Active Problems:    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 *      Medical Problems       Resolved Problems  Date Reviewed: 6/28/2025   None         Discharge Medications:    See after visit summary for all reconciled discharge medications provided to patient and family.      Discharge instructions/Information to patient and family:     See after visit summary for information provided to patient and family.      Provisions for Follow-Up Care:    See after visit summary for information related to follow-up care and any pertinent home health orders.      Discharge Statement:    I spent 45 minutes discharging the patient. This time was spent on the day of discharge. I had direct contact with the patient on the day of discharge.     Additional documentation is required if more than 30 minutes were spent on discharge:    >30 minutes of time was spent on: Diagnostic results, Prognosis, Risks and benefits of tx options, Instructions for management, Patient and family education, Importance of tx compliance, Risk factor  reductions, Impressions, Counseling / Coordination of care, Documenting in the medical record, and Reviewing / ordering tests, medicine, procedures  .  I reviewed with Ibrahima importance of compliance with medications and outpatient treatment after discharge.  I discussed the medication regimen and possible side effects of the medications with Ibrahima prior to discharge. At the time of discharge he was tolerating psychiatric medications.  I discussed outpatient follow up with Ibrahima.  I reviewed with Ibrahima crisis plan and safety plan upon discharge.    Discharge on Two Antipsychotic Medications: Ingrid Carbaallo MD 06/28/25               [1]   Past Medical History:  Diagnosis Date    Chronic back pain    [2]   Past Surgical History:  Procedure Laterality Date    WISDOM TOOTH EXTRACTION     [3] No Known Allergies

## 2025-06-28 NOTE — NURSING NOTE
Pt calm and cooperative during interaction. Reports feeling ready for discharge. Denies SI/HI or hallucination. Compliant with medication.

## 2025-06-28 NOTE — PLAN OF CARE
Problem: DISCHARGE PLANNING  Goal: Discharge to home or other facility with appropriate resources  Description: INTERVENTIONS:  - Identify barriers to discharge w/patient and caregiver  - Arrange for needed discharge resources and transportation as appropriate  - Identify discharge learning needs (meds, wound care, etc.)  - Arrange for interpretive services to assist at discharge as needed  - Refer to Case Management Department for coordinating discharge planning if the patient needs post-hospital services based on physician/advanced practitioner order or complex needs related to functional status, cognitive ability, or social support system  Outcome: Adequate for Discharge     Problem: BEHAVIOR  Goal: Pt/Family maintain appropriate behavior and adhere to behavioral management agreement, if implemented  Description: INTERVENTIONS:  - Assess the family dynamic   - Encourage verbalization of thoughts and concerns in a socially appropriate manner  - Assess patient/family's coping skills and non-compliant behavior (including use of illegal substances).  - Utilize positive, consistent limit setting strategies supporting safety of patient, staff and others  - Initiate consult with Case Management, Spiritual Care or other ancillary services as appropriate  - If a patient's/visitor's behavior jeopardizes the safety of the patient, staff, or others, refer to organization procedure.   - Notify Security of behavior or suspected illegal substances which indicate the need for search of the patient and/or belongings  - Encourage participation in the decision making process about a behavioral management agreement; implement if patient meets criteria  Outcome: Adequate for Discharge     Problem: ANXIETY  Goal: Will report anxiety at manageable levels  Description: INTERVENTIONS:  - Administer medication as ordered  - Teach and encourage coping skills  - Provide emotional support  - Assess patient/family for anxiety and ability to  cope  Outcome: Adequate for Discharge  Goal: By discharge: Patient will verbalize 2 strategies to deal with anxiety  Description: Interventions:  - Identify any obvious source/trigger to anxiety  - Staff will assist patient in applying identified coping technique/skills  - Encourage attendance of scheduled groups and activities  Outcome: Adequate for Discharge     Problem: Ineffective Coping  Goal: Participates in unit activities  Description: Interventions:  - Provide therapeutic environment   - Provide required programming   - Redirect inappropriate behaviors   Outcome: Adequate for Discharge

## 2025-06-28 NOTE — NURSING NOTE
Pt appears anxious and restless, awaiting discharge today. Pt requested and received PRN propanolol 10mg at 0954 for same. /91,  .

## 2025-06-28 NOTE — TREATMENT TEAM
06/28/25 0800   Team Meeting   Meeting Type Daily Rounds   Team Members Present   Team Members Present Physician;Nurse   Physician Team Member Ilya/Ronaldo   Nursing Team Member Deysi   Patient/Family Present   Patient Present No   Patient's Family Present No     AM rounds- denies SI/HI, moderate depression and anxiety. Received prn for this. Elevated bp. Slept well. 72 hour notice remains. Wife can pick pt up at 12pm.

## 2025-06-28 NOTE — BH TRANSITION RECORD
Contact Information: If you have any questions, concerns, pended studies, tests and/or procedures, or emergencies regarding your inpatient behavioral health visit. Please contact [place] [facility] [phone] and ask to speak to a , nurse or physician. A contact is available 24 hours/ 7 days a week at this number.     Summary of Procedures Performed During your Stay:  Below is a list of major procedures performed during your hospital stay and a summary of results:  - No major procedures performed.    Pending Studies (From admission, onward)      None          Please follow up on the above pending studies with your PCP and/or referring provider.

## 2025-06-28 NOTE — NURSING NOTE
Pt expressed readiness for discharge. AVS reviewed with pt. Denies any question or concerns. Pt discharge from unit via wife picking pt up.

## 2025-06-28 NOTE — PLAN OF CARE
Problem: DISCHARGE PLANNING  Goal: Discharge to home or other facility with appropriate resources  Description: INTERVENTIONS:  - Identify barriers to discharge w/patient and caregiver  - Arrange for needed discharge resources and transportation as appropriate  - Identify discharge learning needs (meds, wound care, etc.)  - Arrange for interpretive services to assist at discharge as needed  - Refer to Case Management Department for coordinating discharge planning if the patient needs post-hospital services based on physician/advanced practitioner order or complex needs related to functional status, cognitive ability, or social support system  Outcome: Progressing     Problem: BEHAVIOR  Goal: Pt/Family maintain appropriate behavior and adhere to behavioral management agreement, if implemented  Description: INTERVENTIONS:  - Assess the family dynamic   - Encourage verbalization of thoughts and concerns in a socially appropriate manner  - Assess patient/family's coping skills and non-compliant behavior (including use of illegal substances).  - Utilize positive, consistent limit setting strategies supporting safety of patient, staff and others  - Initiate consult with Case Management, Spiritual Care or other ancillary services as appropriate  - If a patient's/visitor's behavior jeopardizes the safety of the patient, staff, or others, refer to organization procedure.   - Notify Security of behavior or suspected illegal substances which indicate the need for search of the patient and/or belongings  - Encourage participation in the decision making process about a behavioral management agreement; implement if patient meets criteria  Outcome: Progressing     Problem: ANXIETY  Goal: Will report anxiety at manageable levels  Description: INTERVENTIONS:  - Administer medication as ordered  - Teach and encourage coping skills  - Provide emotional support  - Assess patient/family for anxiety and ability to cope  Outcome:  Progressing  Goal: By discharge: Patient will verbalize 2 strategies to deal with anxiety  Description: Interventions:  - Identify any obvious source/trigger to anxiety  - Staff will assist patient in applying identified coping technique/skills  - Encourage attendance of scheduled groups and activities  Outcome: Progressing     Problem: Ineffective Coping  Goal: Participates in unit activities  Description: Interventions:  - Provide therapeutic environment   - Provide required programming   - Redirect inappropriate behaviors   Outcome: Not Progressing

## 2025-07-02 NOTE — ED PROVIDER NOTES
Time reflects when diagnosis was documented in both MDM as applicable and the Disposition within this note       Time User Action Codes Description Comment    6/17/2025  8:04 PM Amelia Carvajal [F41.9] Anxiety     6/17/2025  8:04 PM Amelia Carvajal [Z00.8] Encounter for psychological evaluation           ED Disposition       ED Disposition   Discharge    Condition   Stable    Date/Time   Tue Jun 17, 2025  8:04 PM    Comment   Ibrahima Adams discharge to home/self care.                   Assessment & Plan       Medical Decision Making  50-year-old male history of anxiety and other unspecified psychiatric illness presents with concerns about increasing anxiety.  Had a televisit with his therapist today who thought he seemed worse than normal.  Patient explains that when he has worsening anxiety he compulsively rubs his face which he is continuing to do while in the ER.  He denies suicidal or homicidal ideation.  No other medical complaints.  Patient was cleared for psychiatric evaluation.  He spoke with crisis worker.  His wife encouraged him to do an inpatient admission but he ultimately decided he would prefer to go home.  He does have outpatient psychiatric support.  will return as needed.    Amount and/or Complexity of Data Reviewed  Labs: ordered.        ED Course as of 07/02/25 0706   Tue Jun 17, 2025   1854 Patient is medically cleared for psychiatric evaluation       Medications - No data to display    ED Risk Strat Scores                    No data recorded        SBIRT 22yo+      Flowsheet Row Most Recent Value   Initial Alcohol Screen: US AUDIT-C     1. How often do you have a drink containing alcohol? 0 Filed at: 06/17/2025 1829   2. How many drinks containing alcohol do you have on a typical day you are drinking?  0 Filed at: 06/17/2025 1829   3a. Male UNDER 65: How often do you have five or more drinks on one occasion? 0 Filed at: 06/17/2025 1829   Audit-C Score 0 Filed at: 06/17/2025 1829   HANY: How  many times in the past year have you...    Used an illegal drug or used a prescription medication for non-medical reasons? Never Filed at: 06/17/2025 1829                            History of Present Illness       Chief Complaint   Patient presents with    Psychiatric Evaluation     Patient arrived via EMS; patient had virtual visit with psychologist today. Psychologist feels patient is worsening reaching out to spouse to have patient brought to ED for evaluation. Patient denies SI HI. Reports feeling anxious, rubbing is face to sooth his anxiety.        Past Medical History[1]   Past Surgical History[2]   Family History[3]   Social History[4]   E-Cigarette/Vaping    E-Cigarette Use Never User       E-Cigarette/Vaping Substances    Nicotine No     THC No     CBD No     Flavoring No     Other No     Unknown No       I have reviewed and agree with the history as documented.     50-year-old male history of anxiety and other unspecified psychiatric illness presents with concerns about increasing anxiety.  Had a televisit with his therapist today who thought he seemed worse than normal.  Patient explains that when he has worsening anxiety he compulsively rubs his face which he is continuing to do while in the ER.  He denies suicidal or homicidal ideation.  No other medical complaints        Review of Systems   All other systems reviewed and are negative.          Objective       ED Triage Vitals   Temperature Pulse Blood Pressure Respirations SpO2 Patient Position - Orthostatic VS   06/17/25 1829 06/17/25 1824 06/17/25 1824 06/17/25 1824 06/17/25 1824 06/17/25 1824   97.6 °F (36.4 °C) 98 114/68 20 97 % Lying      Temp Source Heart Rate Source BP Location FiO2 (%) Pain Score    06/17/25 1829 06/17/25 1824 06/17/25 1824 -- 06/17/25 1824    Oral Monitor Left arm  No Pain      Vitals      Date and Time Temp Pulse SpO2 Resp BP Pain Score FACES Pain Rating User   06/17/25 1829 97.6 °F (36.4 °C) -- -- -- -- -- -- DU   06/17/25  1824 -- 98 97 % 20 114/68 No Pain -- DU            Physical Exam  Constitutional:       Comments: Mildly distressed   HENT:      Head: Normocephalic.      Mouth/Throat:      Mouth: Mucous membranes are moist.     Eyes:      Conjunctiva/sclera: Conjunctivae normal.       Cardiovascular:      Rate and Rhythm: Normal rate and regular rhythm.   Pulmonary:      Effort: Pulmonary effort is normal.     Musculoskeletal:         General: Normal range of motion.     Skin:     General: Skin is dry.     Neurological:      General: No focal deficit present.      Mental Status: He is alert and oriented to person, place, and time.     Psychiatric:      Comments: Anxious, compulsively rubbing face         Results Reviewed       Procedure Component Value Units Date/Time    POCT alcohol breath test [[id number]]  (Normal) Collected: 06/17/25 1949    Lab Status: Final result Updated: 06/17/25 1950     EXTBreath Alcohol 0.000            No orders to display       Procedures    ED Medication and Procedure Management   Prior to Admission Medications   Prescriptions Last Dose Informant Patient Reported? Taking?   risperiDONE (RisperDAL) 4 mg tablet 6/17/2025  No Yes   Sig: Take 1 tablet (4 mg total) by mouth 2 (two) times a day   sertraline (ZOLOFT) 100 mg tablet 6/17/2025  No Yes   Sig: Take 1 tablet (100 mg total) by mouth every morning   traZODone (DESYREL) 100 mg tablet 6/16/2025  No Yes   Sig: Take 1 tablet (100 mg total) by mouth daily at bedtime      Facility-Administered Medications: None     Discharge Medication List as of 6/17/2025  8:04 PM        CONTINUE these medications which have NOT CHANGED    Details   risperiDONE (RisperDAL) 4 mg tablet Take 1 tablet (4 mg total) by mouth 2 (two) times a day, Starting Wed 6/4/2025, Normal      sertraline (ZOLOFT) 100 mg tablet Take 1 tablet (100 mg total) by mouth every morning, Starting Mon 4/14/2025, Normal      traZODone (DESYREL) 100 mg tablet Take 1 tablet (100 mg total) by mouth  daily at bedtime, Starting Mon 4/14/2025, Normal      busPIRone (BUSPAR) 15 mg tablet TAKE ONE TABLET BY MOUTH TWICE A DAY, Starting Tue 5/20/2025, Normal           No discharge procedures on file.  ED SEPSIS DOCUMENTATION   Time reflects when diagnosis was documented in both MDM as applicable and the Disposition within this note       Time User Action Codes Description Comment    6/17/2025  8:04 PM Amelia Carvajal [F41.9] Anxiety     6/17/2025  8:04 PM Amelia Carvajal [Z00.8] Encounter for psychological evaluation                    [1]   Past Medical History:  Diagnosis Date    Chronic back pain    [2]   Past Surgical History:  Procedure Laterality Date    WISDOM TOOTH EXTRACTION     [3]   Family History  Problem Relation Name Age of Onset    Cirrhosis Mother      Cholelithiasis Mother      Diabetes type II Father      Stroke Maternal Grandfather      Diabetes Paternal Grandfather     [4]   Social History  Tobacco Use    Smoking status: Former     Types: Cigars    Smokeless tobacco: Never    Tobacco comments:     has a cigar about 2/month   Vaping Use    Vaping status: Never Used   Substance Use Topics    Alcohol use: Yes     Alcohol/week: 2.0 standard drinks of alcohol     Types: 1 Glasses of wine, 1 Cans of beer per week     Comment: 3 drinks/week    Drug use: Not Currently     Types: Marijuana, Cocaine, Oxycodone     Comment: snorted cocain about 10 years ago for 1 year        Amelia Carvajal MD  07/02/25 0712

## 2025-07-05 DIAGNOSIS — F95.2 TOURETTE SYNDROME: Primary | ICD-10-CM

## 2025-07-05 DIAGNOSIS — F41.1 GENERALIZED ANXIETY DISORDER: ICD-10-CM

## 2025-07-05 DIAGNOSIS — F32.A DEPRESSIVE DISORDER: ICD-10-CM

## 2025-07-07 ENCOUNTER — OFFICE VISIT (OUTPATIENT)
Dept: PSYCHOLOGY | Facility: CLINIC | Age: 50
End: 2025-07-07

## 2025-07-07 ENCOUNTER — DOCUMENTATION (OUTPATIENT)
Dept: PSYCHOLOGY | Facility: CLINIC | Age: 50
End: 2025-07-07

## 2025-07-07 DIAGNOSIS — F44.5 FUNCTIONAL NEUROLOGICAL SYMPTOM DISORDER WITH ATTACKS OR SEIZURES: Primary | ICD-10-CM

## 2025-07-07 PROCEDURE — NOSHOW: Performed by: STUDENT IN AN ORGANIZED HEALTH CARE EDUCATION/TRAINING PROGRAM

## 2025-07-07 NOTE — PROGRESS NOTES
Subjective:     Patient ID: Ibrahima Adams 50 y.o. Male    [facility] Clinical Progress Notes      Specialized Services Documentation  Therapist must complete separate progress note for each specific clinical activity in which the individual participated during the day.     Case Management     Ibrahima Adams No Call No Showed Program for his intake on 7/7/2025.  Alona Perry called and LVM for  Ibrahima Adams     Current suicide risk: unable to assess      Medications changes/added/denied? No     Treatment session number: n/a     Individual Case Management Visit provided today? N/A      [facility] follow up physician's orders: Continue to follow orders.     Therapist: SERGO Figueroa Plan Objective: 1.0   
Initial (On Arrival)

## 2025-07-08 RX ORDER — RISPERIDONE 4 MG/1
4 TABLET ORAL 2 TIMES DAILY
Qty: 30 TABLET | Refills: 1 | Status: SHIPPED | OUTPATIENT
Start: 2025-07-08

## 2025-07-08 RX ORDER — TRAZODONE HYDROCHLORIDE 100 MG/1
100 TABLET ORAL
Qty: 30 TABLET | Refills: 1 | Status: SHIPPED | OUTPATIENT
Start: 2025-07-08

## 2025-07-14 DIAGNOSIS — F32.A DEPRESSIVE DISORDER: ICD-10-CM

## 2025-07-14 DIAGNOSIS — F41.1 GENERALIZED ANXIETY DISORDER: ICD-10-CM

## 2025-07-14 RX ORDER — SERTRALINE HYDROCHLORIDE 100 MG/1
100 TABLET, FILM COATED ORAL DAILY
Qty: 30 TABLET | Refills: 1 | Status: SHIPPED | OUTPATIENT
Start: 2025-07-14 | End: 2025-07-21 | Stop reason: SDUPTHER

## 2025-07-15 ENCOUNTER — HOSPITAL ENCOUNTER (EMERGENCY)
Facility: HOSPITAL | Age: 50
Discharge: HOME | End: 2025-07-15
Attending: EMERGENCY MEDICINE | Admitting: EMERGENCY MEDICINE
Payer: COMMERCIAL

## 2025-07-15 ENCOUNTER — APPOINTMENT (EMERGENCY)
Dept: RADIOLOGY | Facility: HOSPITAL | Age: 50
End: 2025-07-15
Payer: COMMERCIAL

## 2025-07-15 VITALS
DIASTOLIC BLOOD PRESSURE: 72 MMHG | OXYGEN SATURATION: 95 % | TEMPERATURE: 98.8 F | HEART RATE: 91 BPM | RESPIRATION RATE: 20 BRPM | SYSTOLIC BLOOD PRESSURE: 129 MMHG

## 2025-07-15 DIAGNOSIS — F44.5 FUNCTIONAL NEUROLOGICAL SYMPTOM DISORDER WITH ATTACKS OR SEIZURES: Primary | ICD-10-CM

## 2025-07-15 DIAGNOSIS — F19.90 SUBSTANCE USE: ICD-10-CM

## 2025-07-15 DIAGNOSIS — R79.89 ELEVATED SERUM CREATININE: ICD-10-CM

## 2025-07-15 LAB
2HR DELTA HS TROPONIN: -6 NG/L
ALBUMIN SERPL BCG-MCNC: 4.7 G/DL (ref 3.5–5)
ALP SERPL-CCNC: 58 U/L (ref 34–104)
ALT SERPL W P-5'-P-CCNC: 10 U/L (ref 7–52)
AMPHETAMINES SERPL QL SCN: NEGATIVE
ANION GAP SERPL CALCULATED.3IONS-SCNC: 12 MMOL/L (ref 4–13)
AST SERPL W P-5'-P-CCNC: 12 U/L (ref 13–39)
BARBITURATES UR QL: NEGATIVE
BASOPHILS # BLD AUTO: 0.05 THOUSANDS/ÂΜL (ref 0–0.1)
BASOPHILS NFR BLD AUTO: 1 % (ref 0–1)
BENZODIAZ UR QL: NEGATIVE
BILIRUB SERPL-MCNC: 0.23 MG/DL (ref 0.2–1)
BUN SERPL-MCNC: 19 MG/DL (ref 5–25)
CALCIUM SERPL-MCNC: 9 MG/DL (ref 8.4–10.2)
CARDIAC TROPONIN I PNL SERPL HS: 33 NG/L (ref ?–50)
CARDIAC TROPONIN I PNL SERPL HS: 39 NG/L (ref ?–50)
CHLORIDE SERPL-SCNC: 106 MMOL/L (ref 96–108)
CK SERPL-CCNC: 144 U/L (ref 39–308)
CO2 SERPL-SCNC: 24 MMOL/L (ref 21–32)
COCAINE UR QL: NEGATIVE
CREAT SERPL-MCNC: 1.78 MG/DL (ref 0.6–1.3)
EOSINOPHIL # BLD AUTO: 0.04 THOUSAND/ÂΜL (ref 0–0.61)
EOSINOPHIL NFR BLD AUTO: 0 % (ref 0–6)
ERYTHROCYTE [DISTWIDTH] IN BLOOD BY AUTOMATED COUNT: 12.3 % (ref 11.6–15.1)
ETHANOL EXG-MCNC: 0.04 MG/DL
FENTANYL UR QL SCN: POSITIVE
GFR SERPL CREATININE-BSD FRML MDRD: 43 ML/MIN/1.73SQ M
GLUCOSE SERPL-MCNC: 111 MG/DL (ref 65–140)
HCT VFR BLD AUTO: 37.7 % (ref 36.5–49.3)
HGB BLD-MCNC: 12.8 G/DL (ref 12–17)
HYDROCODONE UR QL SCN: NEGATIVE
IMM GRANULOCYTES # BLD AUTO: 0.04 THOUSAND/UL (ref 0–0.2)
IMM GRANULOCYTES NFR BLD AUTO: 0 % (ref 0–2)
LYMPHOCYTES # BLD AUTO: 1.06 THOUSANDS/ÂΜL (ref 0.6–4.47)
LYMPHOCYTES NFR BLD AUTO: 10 % (ref 14–44)
MCH RBC QN AUTO: 30.3 PG (ref 26.8–34.3)
MCHC RBC AUTO-ENTMCNC: 34 G/DL (ref 31.4–37.4)
MCV RBC AUTO: 89 FL (ref 82–98)
METHADONE UR QL: NEGATIVE
MONOCYTES # BLD AUTO: 0.49 THOUSAND/ÂΜL (ref 0.17–1.22)
MONOCYTES NFR BLD AUTO: 5 % (ref 4–12)
NEUTROPHILS # BLD AUTO: 9.12 THOUSANDS/ÂΜL (ref 1.85–7.62)
NEUTS SEG NFR BLD AUTO: 84 % (ref 43–75)
NRBC BLD AUTO-RTO: 0 /100 WBCS
OPIATES UR QL SCN: NEGATIVE
OXYCODONE+OXYMORPHONE UR QL SCN: NEGATIVE
PCP UR QL: NEGATIVE
PLATELET # BLD AUTO: 265 THOUSANDS/UL (ref 149–390)
PMV BLD AUTO: 9 FL (ref 8.9–12.7)
POTASSIUM SERPL-SCNC: 3.7 MMOL/L (ref 3.5–5.3)
PROT SERPL-MCNC: 7.6 G/DL (ref 6.4–8.4)
RBC # BLD AUTO: 4.22 MILLION/UL (ref 3.88–5.62)
SODIUM SERPL-SCNC: 142 MMOL/L (ref 135–147)
THC UR QL: NEGATIVE
WBC # BLD AUTO: 10.8 THOUSAND/UL (ref 4.31–10.16)

## 2025-07-15 PROCEDURE — 80053 COMPREHEN METABOLIC PANEL: CPT | Performed by: EMERGENCY MEDICINE

## 2025-07-15 PROCEDURE — 82075 ASSAY OF BREATH ETHANOL: CPT | Performed by: EMERGENCY MEDICINE

## 2025-07-15 PROCEDURE — 93005 ELECTROCARDIOGRAM TRACING: CPT

## 2025-07-15 PROCEDURE — 85025 COMPLETE CBC W/AUTO DIFF WBC: CPT | Performed by: EMERGENCY MEDICINE

## 2025-07-15 PROCEDURE — 99285 EMERGENCY DEPT VISIT HI MDM: CPT

## 2025-07-15 PROCEDURE — 96361 HYDRATE IV INFUSION ADD-ON: CPT

## 2025-07-15 PROCEDURE — 82550 ASSAY OF CK (CPK): CPT | Performed by: EMERGENCY MEDICINE

## 2025-07-15 PROCEDURE — 96360 HYDRATION IV INFUSION INIT: CPT

## 2025-07-15 PROCEDURE — 99285 EMERGENCY DEPT VISIT HI MDM: CPT | Performed by: EMERGENCY MEDICINE

## 2025-07-15 PROCEDURE — 71045 X-RAY EXAM CHEST 1 VIEW: CPT

## 2025-07-15 PROCEDURE — 36415 COLL VENOUS BLD VENIPUNCTURE: CPT | Performed by: EMERGENCY MEDICINE

## 2025-07-15 PROCEDURE — 84484 ASSAY OF TROPONIN QUANT: CPT | Performed by: EMERGENCY MEDICINE

## 2025-07-15 PROCEDURE — 80307 DRUG TEST PRSMV CHEM ANLYZR: CPT | Performed by: EMERGENCY MEDICINE

## 2025-07-15 RX ADMIN — SODIUM CHLORIDE 1000 ML: 0.9 INJECTION, SOLUTION INTRAVENOUS at 18:40

## 2025-07-15 RX ADMIN — SODIUM CHLORIDE 1000 ML: 0.9 INJECTION, SOLUTION INTRAVENOUS at 19:36

## 2025-07-15 NOTE — ED PROVIDER NOTES
"  ED Disposition       None          Assessment & Plan       Medical Decision Making  This is a 50-year-old male who presents to the emergency department after heroin overdose and a mental health episode.  I considered pneumothorax, respiratory arrest, opioid overdose, depression. These and other diagnoses were considered.         Amount and/or Complexity of Data Reviewed  Labs: ordered.  Radiology: ordered and independent interpretation performed.        ED Course as of 07/15/25 1944   Tue Jul 15, 2025   1847 The patient had a chest x-ray that was independently interpreted by me that shows no pneumonia or pneumothorax.       Medications   sodium chloride 0.9 % bolus 1,000 mL (1,000 mL Intravenous New Bag 7/15/25 1936)   sodium chloride 0.9 % bolus 1,000 mL (1,000 mL Intravenous New Bag 7/15/25 1840)       ED Risk Strat Scores                    No data recorded        SBIRT 20yo+      Flowsheet Row Most Recent Value   Initial Alcohol Screen: US AUDIT-C     1. How often do you have a drink containing alcohol? 0 Filed at: 07/15/2025 1822   Audit-C Score 0 Filed at: 07/15/2025 1822   HANY: How many times in the past year have you...    Used an illegal drug or used a prescription medication for non-medical reasons? Daily or Almost Daily Filed at: 07/15/2025 1822                            History of Present Illness       Chief Complaint   Patient presents with    Mental Health Problem     Arrives via EMS with report that patient's wife called due to patient having an \"episode\" at home today, pt states he thinks she is concerned about his mental health, pt does state he think he had an accidental overdose on possible fentanyl earlier today but refused transport to hospital at that time after EMS intervened. Pt denies SI/HI at time of triage.       Past Medical History[1]   Past Surgical History[2]   Family History[3]   Social History[4]   E-Cigarette/Vaping    E-Cigarette Use Never User       E-Cigarette/Vaping " Substances    Nicotine No     THC No     CBD No     Flavoring No     Other No     Unknown No       I have reviewed and agree with the history as documented.     This is a 50-year-old male who presents to the emergency department after a respiratory arrest today.  As per EMS, the patient had CPR being performed on him earlier in the day.  He received Narcan and began breathing and woke up.  He refused medical attention at that point.  His wife now called 911 for erratic behavior.  She states that the patient was hitting himself.  The patient states he has functional neurologic disorder and is having an episode.  He states he is not suicidal.  He does not want to hurt himself.  He states that he does not want to hurt anyone else.  He denies doing anything that could be considered a danger to himself.  Wife denies any circumstances where he could have been a danger to himself after his discharge from psychiatric facility.  Patient does admit to fentanyl use today.  He states he would like help with his fentanyl abuse.        Review of Systems   All other systems reviewed and are negative.          Objective       ED Triage Vitals   Temperature Pulse Blood Pressure Respirations SpO2 Patient Position - Orthostatic VS   07/15/25 1824 07/15/25 1820 07/15/25 1820 07/15/25 1820 07/15/25 1820 07/15/25 1820   98.8 °F (37.1 °C) (!) 125 131/76 18 95 % Sitting      Temp Source Heart Rate Source BP Location FiO2 (%) Pain Score    07/15/25 1824 07/15/25 1820 07/15/25 1820 -- 07/15/25 1820    Oral Monitor Left arm  No Pain      Vitals      Date and Time Temp Pulse SpO2 Resp BP Pain Score FACES Pain Rating User   07/15/25 1824 98.8 °F (37.1 °C) -- -- -- -- -- -- KLB   07/15/25 1820 -- 125 95 % 18 131/76 No Pain -- KLB            Physical Exam  Constitutional:  Vital signs reviewed, patient appears diaphoretic  Eyes: Pupils equal round reactive to light and accommodation, extraocular muscles intact  HEENT: trachea midline, no JVD,  moist mucous membranes  Respiratory: lung sounds clear throughout, no rhonchi, no rales  Cardiovascular: Tachycardic rate, regular rhythm, no murmurs or rubs  Abdomen: soft, nontender, nondistended, no rebound or guarding  Back: no CVA tenderness, normal inspection  Extremities: no edema, pulses equal in all 4 extremities  Neuro: awake, alert, oriented, no focal weakness, multiple repetitive movements  Skin: warm, dry, intact, no rashes noted    Results Reviewed       Procedure Component Value Units Date/Time    HS Troponin 0hr (reflex protocol) [[id number]]  (Normal) Collected: 07/15/25 1838    Lab Status: Final result Specimen: Blood from Arm, Left Updated: 07/15/25 1908     hs TnI 0hr 39 ng/L     HS Troponin I 2hr [[id number]]     Lab Status: No result Specimen: Blood     Comprehensive metabolic panel [[id number]]  (Abnormal) Collected: 07/15/25 1838    Lab Status: Final result Specimen: Blood from Arm, Left Updated: 07/15/25 1903     Sodium 142 mmol/L      Potassium 3.7 mmol/L      Chloride 106 mmol/L      CO2 24 mmol/L      ANION GAP 12 mmol/L      BUN 19 mg/dL      Creatinine 1.78 mg/dL      Glucose 111 mg/dL      Calcium 9.0 mg/dL      AST 12 U/L      ALT 10 U/L      Alkaline Phosphatase 58 U/L      Total Protein 7.6 g/dL      Albumin 4.7 g/dL      Total Bilirubin 0.23 mg/dL      eGFR 43 ml/min/1.73sq m     Narrative:      National Kidney Disease Foundation guidelines for Chronic Kidney Disease (CKD):     Stage 1 with normal or high GFR (GFR > 90 mL/min/1.73 square meters)    Stage 2 Mild CKD (GFR = 60-89 mL/min/1.73 square meters)    Stage 3A Moderate CKD (GFR = 45-59 mL/min/1.73 square meters)    Stage 3B Moderate CKD (GFR = 30-44 mL/min/1.73 square meters)    Stage 4 Severe CKD (GFR = 15-29 mL/min/1.73 square meters)    Stage 5 End Stage CKD (GFR <15 mL/min/1.73 square meters)  Note: GFR calculation is accurate only with a steady state creatinine    CK [[id number]]  (Normal) Collected: 07/15/25 1838    Lab  Status: Final result Specimen: Blood from Arm, Left Updated: 07/15/25 1903     Total  U/L     CBC and differential [[id number]]  (Abnormal) Collected: 07/15/25 1838    Lab Status: Final result Specimen: Blood from Arm, Left Updated: 07/15/25 1845     WBC 10.80 Thousand/uL      RBC 4.22 Million/uL      Hemoglobin 12.8 g/dL      Hematocrit 37.7 %      MCV 89 fL      MCH 30.3 pg      MCHC 34.0 g/dL      RDW 12.3 %      MPV 9.0 fL      Platelets 265 Thousands/uL      nRBC 0 /100 WBCs      Segmented % 84 %      Immature Grans % 0 %      Lymphocytes % 10 %      Monocytes % 5 %      Eosinophils Relative 0 %      Basophils Relative 1 %      Absolute Neutrophils 9.12 Thousands/µL      Absolute Immature Grans 0.04 Thousand/uL      Absolute Lymphocytes 1.06 Thousands/µL      Absolute Monocytes 0.49 Thousand/µL      Eosinophils Absolute 0.04 Thousand/µL      Basophils Absolute 0.05 Thousands/µL     POCT alcohol breath test [[id number]]  (Normal) Collected: 07/15/25 1831    Lab Status: Final result Updated: 07/15/25 1831     EXTBreath Alcohol 0.036    Rapid drug screen, urine [[id number]]     Lab Status: No result Specimen: Urine             XR chest 1 view portable   ED Interpretation by Almas Mckeon DO (07/15 1847)   No pneumonia or pneumothorax.          ECG 12 Lead Documentation Only    Date/Time: 7/15/2025 7:38 PM    Performed by: Almas Mckeon DO  Authorized by: Almas Mckeon DO    ECG reviewed by me, the ED Provider: yes    Patient location:  ED  Comments:      Sinus tachycardia, rate 111, normal [place], normal QTc, no STEMI, tachycardic compared to EKG dated June 26, 2025, EKG independently interpreted by me      ED Medication and Procedure Management   Prior to Admission Medications   Prescriptions Last Dose Informant Patient Reported? Taking?   busPIRone (BUSPAR) 15 mg tablet   No No   Sig: TAKE ONE TABLET BY MOUTH TWICE A DAY   risperiDONE (RisperDAL) 4 mg tablet   No No   Sig: TAKE ONE TABLET BY MOUTH TWICE  A DAY   sertraline (ZOLOFT) 100 mg tablet   No No   Sig: TAKE ONE TABLET BY MOUTH EVERY MORNING   traZODone (DESYREL) 100 mg tablet   No No   Sig: TAKE ONE TABLET BY MOUTH DAILY AT BEDTIME      Facility-Administered Medications: None     Patient's Medications   Discharge Prescriptions    No medications on file     No discharge procedures on file.  ED SEPSIS DOCUMENTATION              [1]   Past Medical History:  Diagnosis Date    Chronic back pain    [2]   Past Surgical History:  Procedure Laterality Date    WISDOM TOOTH EXTRACTION     [3]   Family History  Problem Relation Name Age of Onset    Cirrhosis Mother      Cholelithiasis Mother      Diabetes type II Father      Stroke Maternal Grandfather      Diabetes Paternal Grandfather     [4]   Social History  Tobacco Use    Smoking status: Former     Types: Cigars    Smokeless tobacco: Never    Tobacco comments:     has a cigar about 2/month   Vaping Use    Vaping status: Never Used   Substance Use Topics    Alcohol use: Yes     Alcohol/week: 2.0 standard drinks of alcohol     Types: 1 Glasses of wine, 1 Cans of beer per week     Comment: 3 drinks/week    Drug use: Not Currently     Types: Marijuana, Cocaine, Oxycodone     Comment: snorted cocain about 10 years ago for 1 year        Almas Mckeon DO  07/16/25 7433

## 2025-07-16 NOTE — ED NOTES
@0830 7/16/25 - Phone call from [facility]  Keiko Rodriguez, requesting an update.  Police responded to the call yesterday.  An update was provided based on the available information in the medical record.

## 2025-07-18 LAB
ATRIAL RATE: 111 BPM
P AXIS: 74 DEGREES
PR INTERVAL: 130 MS
QRS AXIS: 64 DEGREES
QRSD INTERVAL: 78 MS
QT INTERVAL: 318 MS
QTC INTERVAL: 432 MS
T WAVE AXIS: 63 DEGREES
VENTRICULAR RATE: 111 BPM

## 2025-07-18 PROCEDURE — 93010 ELECTROCARDIOGRAM REPORT: CPT | Performed by: INTERNAL MEDICINE

## 2025-07-21 ENCOUNTER — TELEPHONE (OUTPATIENT)
Dept: PSYCHIATRY | Facility: CLINIC | Age: 50
End: 2025-07-21

## 2025-07-21 ENCOUNTER — TELEMEDICINE (OUTPATIENT)
Dept: PSYCHIATRY | Facility: CLINIC | Age: 50
End: 2025-07-21
Payer: COMMERCIAL

## 2025-07-21 DIAGNOSIS — F95.2 TOURETTE SYNDROME: ICD-10-CM

## 2025-07-21 DIAGNOSIS — F32.A DEPRESSIVE DISORDER: ICD-10-CM

## 2025-07-21 DIAGNOSIS — F41.1 GENERALIZED ANXIETY DISORDER: ICD-10-CM

## 2025-07-21 PROCEDURE — 99214 OFFICE O/P EST MOD 30 MIN: CPT | Performed by: PSYCHIATRY & NEUROLOGY

## 2025-07-21 RX ORDER — BUSPIRONE HYDROCHLORIDE 15 MG/1
15 TABLET ORAL 2 TIMES DAILY
Qty: 60 TABLET | Refills: 0 | Status: SHIPPED | OUTPATIENT
Start: 2025-07-21

## 2025-07-21 RX ORDER — SERTRALINE HYDROCHLORIDE 100 MG/1
100 TABLET, FILM COATED ORAL DAILY
Qty: 30 TABLET | Refills: 1 | Status: SHIPPED | OUTPATIENT
Start: 2025-07-21

## 2025-07-21 RX ORDER — RISPERIDONE 4 MG/1
4 TABLET ORAL 2 TIMES DAILY
Qty: 30 TABLET | Refills: 1 | Status: SHIPPED | OUTPATIENT
Start: 2025-07-21

## 2025-07-21 RX ORDER — TRAZODONE HYDROCHLORIDE 100 MG/1
100 TABLET ORAL
Qty: 30 TABLET | Refills: 1 | Status: SHIPPED | OUTPATIENT
Start: 2025-07-21

## 2025-07-21 NOTE — BH TREATMENT PLAN
TREATMENT PLAN (Medication Management Only)        [facility]    Name and Date of Birth:  Ibrahima Adams 50 y.o. 1975  MRN: [id number]  Date of Treatment Plan: July 21, 2025  Diagnosis/Diagnoses:    1. Generalized anxiety disorder    2. Depressive disorder    3. Tourette syndrome      Strengths/Personal Resources for Self-Care: supportive family, taking medications as prescribed, ability to adapt to life changes, ability to communicate needs.  Area/Areas of need (in own words): anxiety, depressive symptoms  1. Long Term Goal:   alleviate anxiety, remission of depression.  Target Date:6 weeks - 9/1/2025  Person/Persons responsible for completion of goal: Ibrahima  2.  Short Term Objective (s) - How will we reach this goal?:   A.  Provider new recommended medication/dosage changes and/or continue medication(s): continue current medications as prescribed.  B.  N/A.  C.  N/A.  Target Date:6 weeks - 9/1/2025  Person/Persons Responsible for Completion of Goal: Ibrahima  Progress Towards Goals: stable  Treatment Modality: medication management every 6 weeks  Review due 180 days from date of this plan: 6 months - 1/21/2026  Expected length of service: ongoing treatment unless revised  My Physician/PA/NP and I have developed this plan together and I agree to work on the goals and objectives. I understand the treatment goals that were developed for my treatment.   Electronic Signatures: on file (unless signed below)    Jimmie Hung MD 07/21/25

## 2025-07-21 NOTE — PSYCH
MEDICATION MANAGEMENT NOTE    PSYCHIATRIC VIRTUAL VISIT        [facility]      Name and Date of Birth:  Ibrahima Adams 50 y.o. 1975 MRN: [id number]    Psychiatric Virtual Visit:    Administrative Statements   Encounter provider Jimmie Hung MD    The Patient is located at Home and in the following state in which I hold an active license [place].    The patient was identified by name and date of birth. Ibrahima Adams was informed that this is a telemedicine visit and that the visit is being conducted through the Epic Embedded platform. He agrees to proceed..  My office door was closed. No one else was in the room.  He acknowledged consent and understanding of privacy and security of the video platform. The patient has agreed to participate and understands they can discontinue the visit at any time.    I have spent a total time of 25 minutes in caring for this patient on the day of the visit/encounter including Instructions for management, Counseling / Coordination of care, Documenting in the medical record, Reviewing/placing orders in the medical record (including tests, medications, and/or procedures), and Obtaining or reviewing history  , not including the time spent for establishing the audio/video connection.         This note was shared with patient.    Visit Time  Visit Start Time: 9:30 am  Visit Stop Time: 9:45 am  Total Visit Duration: 15 minutes    Review Of Systems:     Mood Euthymic   Thought Content Normal   General As HPI   Physical symptoms As Noted in HPI       Laboratory Results: No results found for this or any previous visit.    Subjective:  The patient was seen for continuing care and pharmacotherapy.  Since risperidone was increased, the episodes associated with Tourette's have diminished in frequency and intensity but he continues to get them.  He is currently laid off from work due to seasonal slowing.  Relationship with his wife is  stable although there is some arguing and they are both considering marriage counseling.  Sleep and appetite are adequate.  He does not report any tremor or stiffness or akathisia        Objective:     Stable  Mental status:  Appearance calm and cooperative , adequate hygiene and grooming, and good eye contact    Mood Euthymic   Affect affect appropriate    Speech Normal rate and Normal volume   Thought Processes coherent/organized   Hallucinations Denies hallucinations and does not appear to be responding to internal stimuli   Thought Content Does not verbalize delusional material   Abnormal Thoughts no suicidal thoughts    Orientation A+O x 3       Assessment/Plan:       Diagnoses and all orders for this visit:    Generalized anxiety disorder  -     busPIRone (BUSPAR) 15 mg tablet; Take 1 tablet (15 mg total) by mouth 2 (two) times a day  -     sertraline (ZOLOFT) 100 mg tablet; Take 1 tablet (100 mg total) by mouth daily  -     traZODone (DESYREL) 100 mg tablet; Take 1 tablet (100 mg total) by mouth daily at bedtime    Depressive disorder  -     sertraline (ZOLOFT) 100 mg tablet; Take 1 tablet (100 mg total) by mouth daily  -     traZODone (DESYREL) 100 mg tablet; Take 1 tablet (100 mg total) by mouth daily at bedtime    Tourette syndrome  -     risperiDONE (RisperDAL) 4 mg tablet; Take 1 tablet (4 mg total) by mouth 2 (two) times a day        1. Generalized anxiety disorder    2. Depressive disorder    3. Tourette syndrome        Treatment Recommendations- Risks Benefits    Continue with same medications.  Follow-up in 6 weeks  Risks, Benefits And Possible Side Effects Of Medications:  Risks, benefits, and possible side effects of medications explained to patient and patient verbalizes understanding    VIRTUAL VISIT DISCLAIMER    Ibrahima Adams verbally agrees to participate in Virtual Care Services. Pt is aware that Virtual Care Services could be limited without vital signs or the ability to perform a full  hands-on physical exam. Ibrahima Adams understands he or the provider may request at any time to terminate the video visit and request the patient to seek care or treatment in person.     Jimmie Hung MD 07/21/25

## 2025-07-22 ENCOUNTER — HOSPITAL ENCOUNTER (EMERGENCY)
Facility: HOSPITAL | Age: 50
Discharge: HOME | End: 2025-07-22
Attending: EMERGENCY MEDICINE | Admitting: EMERGENCY MEDICINE
Payer: COMMERCIAL

## 2025-07-22 VITALS
HEART RATE: 70 BPM | OXYGEN SATURATION: 95 % | SYSTOLIC BLOOD PRESSURE: 137 MMHG | TEMPERATURE: 98 F | RESPIRATION RATE: 18 BRPM | DIASTOLIC BLOOD PRESSURE: 76 MMHG

## 2025-07-22 DIAGNOSIS — F10.929 ALCOHOL INTOXICATION (HCC): ICD-10-CM

## 2025-07-22 DIAGNOSIS — R41.82 ALTERED MENTAL STATUS: ICD-10-CM

## 2025-07-22 DIAGNOSIS — R79.89 ELEVATED SERUM CREATININE: ICD-10-CM

## 2025-07-22 DIAGNOSIS — F11.10 OPIOID ABUSE (HCC): Primary | ICD-10-CM

## 2025-07-22 LAB
2HR DELTA HS TROPONIN: 1 NG/L
ALBUMIN SERPL BCG-MCNC: 4.5 G/DL (ref 3.5–5)
ALP SERPL-CCNC: 54 U/L (ref 34–104)
ALT SERPL W P-5'-P-CCNC: 11 U/L (ref 7–52)
AMPHETAMINES SERPL QL SCN: NEGATIVE
ANION GAP SERPL CALCULATED.3IONS-SCNC: 14 MMOL/L (ref 4–13)
APAP SERPL-MCNC: <2 UG/ML (ref 10–20)
AST SERPL W P-5'-P-CCNC: 11 U/L (ref 13–39)
BARBITURATES UR QL: NEGATIVE
BASOPHILS # BLD AUTO: 0.1 THOUSANDS/ÂΜL (ref 0–0.1)
BASOPHILS NFR BLD AUTO: 1 % (ref 0–1)
BENZODIAZ UR QL: NEGATIVE
BILIRUB SERPL-MCNC: 0.34 MG/DL (ref 0.2–1)
BUN SERPL-MCNC: 12 MG/DL (ref 5–25)
CALCIUM SERPL-MCNC: 8.9 MG/DL (ref 8.4–10.2)
CARDIAC TROPONIN I PNL SERPL HS: 6 NG/L (ref ?–50)
CARDIAC TROPONIN I PNL SERPL HS: 7 NG/L (ref ?–50)
CHLORIDE SERPL-SCNC: 107 MMOL/L (ref 96–108)
CK SERPL-CCNC: 171 U/L (ref 39–308)
CO2 SERPL-SCNC: 24 MMOL/L (ref 21–32)
COCAINE UR QL: NEGATIVE
CREAT SERPL-MCNC: 1.74 MG/DL (ref 0.6–1.3)
EOSINOPHIL # BLD AUTO: 0.05 THOUSAND/ÂΜL (ref 0–0.61)
EOSINOPHIL NFR BLD AUTO: 1 % (ref 0–6)
ERYTHROCYTE [DISTWIDTH] IN BLOOD BY AUTOMATED COUNT: 12.4 % (ref 11.6–15.1)
ETHANOL SERPL-MCNC: 111 MG/DL
FENTANYL UR QL SCN: POSITIVE
GFR SERPL CREATININE-BSD FRML MDRD: 45 ML/MIN/1.73SQ M
GLUCOSE SERPL-MCNC: 163 MG/DL (ref 65–140)
HCT VFR BLD AUTO: 38.4 % (ref 36.5–49.3)
HGB BLD-MCNC: 12.5 G/DL (ref 12–17)
HYDROCODONE UR QL SCN: NEGATIVE
IMM GRANULOCYTES # BLD AUTO: 0.02 THOUSAND/UL (ref 0–0.2)
IMM GRANULOCYTES NFR BLD AUTO: 0 % (ref 0–2)
LYMPHOCYTES # BLD AUTO: 1.93 THOUSANDS/ÂΜL (ref 0.6–4.47)
LYMPHOCYTES NFR BLD AUTO: 23 % (ref 14–44)
MCH RBC QN AUTO: 30 PG (ref 26.8–34.3)
MCHC RBC AUTO-ENTMCNC: 32.6 G/DL (ref 31.4–37.4)
MCV RBC AUTO: 92 FL (ref 82–98)
METHADONE UR QL: NEGATIVE
MONOCYTES # BLD AUTO: 0.39 THOUSAND/ÂΜL (ref 0.17–1.22)
MONOCYTES NFR BLD AUTO: 5 % (ref 4–12)
NEUTROPHILS # BLD AUTO: 5.77 THOUSANDS/ÂΜL (ref 1.85–7.62)
NEUTS SEG NFR BLD AUTO: 70 % (ref 43–75)
NRBC BLD AUTO-RTO: 0 /100 WBCS
OPIATES UR QL SCN: NEGATIVE
OXYCODONE+OXYMORPHONE UR QL SCN: NEGATIVE
PCP UR QL: NEGATIVE
PLATELET # BLD AUTO: 360 THOUSANDS/UL (ref 149–390)
PMV BLD AUTO: 9.5 FL (ref 8.9–12.7)
POTASSIUM SERPL-SCNC: 3.6 MMOL/L (ref 3.5–5.3)
PROT SERPL-MCNC: 7.1 G/DL (ref 6.4–8.4)
RBC # BLD AUTO: 4.16 MILLION/UL (ref 3.88–5.62)
SALICYLATES SERPL-MCNC: <5 MG/DL (ref 5–20)
SODIUM SERPL-SCNC: 145 MMOL/L (ref 135–147)
THC UR QL: NEGATIVE
WBC # BLD AUTO: 8.26 THOUSAND/UL (ref 4.31–10.16)

## 2025-07-22 PROCEDURE — 82550 ASSAY OF CK (CPK): CPT | Performed by: EMERGENCY MEDICINE

## 2025-07-22 PROCEDURE — 99285 EMERGENCY DEPT VISIT HI MDM: CPT

## 2025-07-22 PROCEDURE — 80143 DRUG ASSAY ACETAMINOPHEN: CPT | Performed by: EMERGENCY MEDICINE

## 2025-07-22 PROCEDURE — 93005 ELECTROCARDIOGRAM TRACING: CPT

## 2025-07-22 PROCEDURE — 96374 THER/PROPH/DIAG INJ IV PUSH: CPT

## 2025-07-22 PROCEDURE — 80307 DRUG TEST PRSMV CHEM ANLYZR: CPT | Performed by: EMERGENCY MEDICINE

## 2025-07-22 PROCEDURE — 80053 COMPREHEN METABOLIC PANEL: CPT | Performed by: EMERGENCY MEDICINE

## 2025-07-22 PROCEDURE — 84484 ASSAY OF TROPONIN QUANT: CPT | Performed by: EMERGENCY MEDICINE

## 2025-07-22 PROCEDURE — 85025 COMPLETE CBC W/AUTO DIFF WBC: CPT | Performed by: EMERGENCY MEDICINE

## 2025-07-22 PROCEDURE — 36415 COLL VENOUS BLD VENIPUNCTURE: CPT | Performed by: EMERGENCY MEDICINE

## 2025-07-22 PROCEDURE — 80179 DRUG ASSAY SALICYLATE: CPT | Performed by: EMERGENCY MEDICINE

## 2025-07-22 PROCEDURE — 82077 ASSAY SPEC XCP UR&BREATH IA: CPT | Performed by: EMERGENCY MEDICINE

## 2025-07-22 PROCEDURE — 96361 HYDRATE IV INFUSION ADD-ON: CPT

## 2025-07-22 PROCEDURE — 99285 EMERGENCY DEPT VISIT HI MDM: CPT | Performed by: EMERGENCY MEDICINE

## 2025-07-22 RX ORDER — LORAZEPAM 2 MG/ML
2 INJECTION INTRAMUSCULAR ONCE
Status: COMPLETED | OUTPATIENT
Start: 2025-07-22 | End: 2025-07-22

## 2025-07-22 RX ADMIN — SODIUM CHLORIDE 1000 ML: 0.9 INJECTION, SOLUTION INTRAVENOUS at 10:28

## 2025-07-22 RX ADMIN — NALOXONE HYDROCHLORIDE 4 MG: 4 SPRAY NASAL at 12:37

## 2025-07-22 RX ADMIN — SODIUM CHLORIDE 1000 ML: 0.9 INJECTION, SOLUTION INTRAVENOUS at 09:44

## 2025-07-22 RX ADMIN — LORAZEPAM 2 MG: 2 INJECTION INTRAMUSCULAR; INTRAVENOUS at 09:32

## 2025-07-22 NOTE — ED PROVIDER NOTES
Time reflects when diagnosis was documented in both MDM as applicable and the Disposition within this note       Time User Action Codes Description Comment    7/22/2025 12:26 PM Martir Dominguez [F11.10] Opioid abuse (HCC)     7/22/2025 12:26 PM Martir Dominguez [F10.929] Alcohol intoxication (HCC)     7/22/2025 12:26 PM Martir Dominguez [R41.82] Altered mental status     7/22/2025 12:26 PM Martir Dominguez [R79.89] Elevated serum creatinine           ED Disposition       ED Disposition   Discharge    Condition   Stable    Date/Time   Tue Jul 22, 2025  1:14 PM    Comment   Ibrahima Adams discharge to home/self care.                   Assessment & Plan       Medical Decision Making  50-year-old male presented to the emergency department for evaluation of altered mental status.Differential includes but not limited to alcohol intoxication, substance abuse, accidental overdose, arrhythmia, electrolyte abnormality, CHAGO, rhabdomyolysis.  These and other diagnoses were considered.  EKG on my independent interpretation with a sinus rhythm with no acute ischemic changes.  Blood work done in the emergency department was consistent with prior labs. Patient transiently hypotensive in the emergency department and was treated with 2 L of normal saline with improvement.  After approximately 3 hours of observation patient became more alert and back to baseline.  Patient acknowledged snorting fentanyl earlier this morning.  Patient spoke with a representative from [facility] but did not want to go to rehab at this time.  On reevaluation the patient was awake, alert and in no acute distress.  He was alert and oriented x 4.  Speech was clear.  Patient ambulating with a steady gait.  Patient clinically sober and was requesting discharge.  Patient discharged per his request with recommendation to follow-up with his PCP and to follow-up with [facility] for rehab placement.    The patient (and/or family present) agrees with  the plan for discharge and feels comfortable to go home with proper follow-up. Diagnostic tests were reviewed. Diagnosis, care plan and treatment options were discussed. All questions were answered prior to discharge. I considered the patient's other medical conditions as applicable/noted above in my medical decision making. Advised the patient to return for worsening or additional problems. The patient (and/or family present) verbalized understanding of the discharge instructions and warnings that would necessitate return to the Emergency Department.          Amount and/or Complexity of Data Reviewed  Independent Historian: EMS  External Data Reviewed: labs, ECG and notes.     Details: Prior labs, EKG and notes reviewed  Labs: ordered. Decision-making details documented in ED Course.  ECG/medicine tests: ordered and independent interpretation performed.    Risk  Prescription drug management.        ED Course as of 07/22/25 1544   Tue Jul 22, 2025   1221 Reevaluated.  He is awake, alert and oriented x 3.  Speech is clear.  Patient admits to snorting fentanyl earlier today.  Patient states that last time he was here he spoke with Roxi from [facility] and states that he is planning to go to rehab.  He reports that he does not want to go to rehab today however.  Risks were discussed and the patient acknowledged understanding the risk.   1304 FENTANYL URINE(!): Positive   1304 Roxi from [facility] came to the emergency department and spoke with the patient in person.  Patient does not want to go to rehab today but states that he wants to follow-up later in the week.  Again risks were discussed and the patient acknowledged understanding the risk.  Patient requesting discharge at this time.   1310 Patient provided with community dispensed Narcan       Medications   sodium chloride 0.9 % bolus 1,000 mL (0 mL Intravenous Stopped 7/22/25 1028)   LORazepam (ATIVAN) injection 2 mg (2 mg Intravenous Given 7/22/25 0932)   sodium  "chloride 0.9 % bolus 1,000 mL (0 mL Intravenous Stopped 7/22/25 1115)   naloxone nasal- Given to patient by provider at discharge. (NARCAN) 4 mg/0.1 mL nasal spray 4 mg (4 mg Does not apply Given by Other 7/22/25 1237)       ED Risk Strat Scores                    No data recorded        SBIRT 20yo+      Flowsheet Row Most Recent Value   Initial Alcohol Screen: US AUDIT-C     1. How often do you have a drink containing alcohol? 1 Filed at: 07/22/2025 1139   2. How many drinks containing alcohol do you have on a typical day you are drinking?  0 Filed at: 07/22/2025 1139   3a. Male UNDER 65: How often do you have five or more drinks on one occasion? 1 Filed at: 07/22/2025 1139   Audit-C Score 2 Filed at: 07/22/2025 1139   HANY: How many times in the past year have you...    Used an illegal drug or used a prescription medication for non-medical reasons? Weekly Filed at: 07/22/2025 1139   DAST-10: In the past 12 months...    1. Have you used drugs other than those required for medical reasons? 1 Filed at: 07/22/2025 1139   2. Do you use more than one drug at a time? 0 Filed at: 07/22/2025 1139   3. Have you had medical problems as a result of your drug use (e.g., memory loss, hepatitis, convulsions, bleeding, etc.)? 0 Filed at: 07/22/2025 1139   4. Have you had \"blackouts\" or \"flashbacks\" as a result of drug use?YesNo 1 Filed at: 07/22/2025 1139   5. Do you ever feel bad or guilty about your drug use? 1 Filed at: 07/22/2025 1139   6. Does your spouse (or parent) ever complain about your involvement with drugs? 1 Filed at: 07/22/2025 1139   7. Have you neglected your family because of your use of drugs? 0 Filed at: 07/22/2025 1139   8. Have you engaged in illegal activities in order to obtain drugs? 0 Filed at: 07/22/2025 1139   9. Have you ever experienced withdrawal symptoms (felt sick) when you stopped taking drugs? 0 Filed at: 07/22/2025 4101   10. Are you always able to stop using drugs when you want to? 1 Filed at: " 07/22/2025 1139   DAST-10 Score 5 Filed at: 07/22/2025 1139                            History of Present Illness       Chief Complaint   Patient presents with    Altered Mental Status     Pt found wandering at a local shopping center with a [employer] nearby. Pt denies use of drugs pta.        Past Medical History[1]   Past Surgical History[2]   Family History[3]   Social History[4]   E-Cigarette/Vaping    E-Cigarette Use Never User       E-Cigarette/Vaping Substances    Nicotine No     THC No     CBD No     Flavoring No     Other No     Unknown No       I have reviewed and agree with the history as documented.     50-year-old male with history of substance abuse presents to the emergency department for evaluation of altered mental status.  Per EMS police were called to a local supermarket as the patient was seen attempting to shoplift bread.  Per EMS the patient was found to be acting bizarrely on scene.  They state the patient was intermittently cursing with pressured speech and agitation.  They state that the patient was compliant though and was agreeable to come to the emergency department for evaluation.  Fingerstick glucose by EMS within normal limits.  Patient denying using any illicit substances.  Per EMS a [employer] was found near the patient but he denies that it is his.  History and review of systems is limited secondary to the patient's altered mental status.        Review of Systems   Unable to perform ROS: Mental status change           Objective       ED Triage Vitals   Temperature Pulse Blood Pressure Respirations SpO2 Patient Position - Orthostatic VS   07/22/25 0925 07/22/25 0940 07/22/25 0940 07/22/25 0940 07/22/25 0925 07/22/25 0940   98 °F (36.7 °C) 102 116/90 16 97 % Lying      Temp src Heart Rate Source BP Location FiO2 (%) Pain Score    -- 07/22/25 0940 07/22/25 0940 -- --     Monitor Right leg        Vitals      Date and Time Temp Pulse SpO2 Resp BP Pain Score FACES Pain Rating User   07/22/25  1245 -- 70 95 % 18 137/76 -- --    07/22/25 1230 -- 86 94 % 18 -- -- --    07/22/25 1215 -- 77 94 % 18 131/62 -- --    07/22/25 1130 -- 89 94 % 18 122/57 -- --    07/22/25 1115 -- 89 96 % 18 97/49 -- --    07/22/25 1041 -- 79 93 % 16 99/45 -- --    07/22/25 1030 -- 94 95 % 20 95/45 -- --    07/22/25 1019 -- 83 97 % 16 102/49 -- --    07/22/25 0954 -- 107 94 % -- 90/42 provider aware -- --    07/22/25 0940 -- 102 -- 16 116/90 -- --    07/22/25 0925 98 °F (36.7 °C) -- 97 % -- -- -- --             Physical Exam  Vitals and nursing note reviewed.   Constitutional:       General: He is not in acute distress.     Appearance: He is well-developed.   HENT:      Head: Normocephalic and atraumatic.     Eyes:      Conjunctiva/sclera: Conjunctivae normal.      Comments: Pinpoint pupils     Cardiovascular:      Rate and Rhythm: Regular rhythm. Tachycardia present.      Heart sounds: No murmur heard.  Pulmonary:      Effort: Pulmonary effort is normal. No respiratory distress.      Breath sounds: Normal breath sounds.   Abdominal:      Palpations: Abdomen is soft.      Tenderness: There is no abdominal tenderness.     Musculoskeletal:         General: No swelling.      Cervical back: Neck supple.     Skin:     General: Skin is warm and dry.      Capillary Refill: Capillary refill takes less than 2 seconds.     Neurological:      Mental Status: He is alert and oriented to person, place, and time.      Comments: Patient repetitively rubbing his face   Psychiatric:         Mood and Affect: Mood normal.         Speech: Speech is rapid and pressured.         Behavior: Behavior is agitated.         Results Reviewed       Procedure Component Value Units Date/Time    Rapid drug screen, urine [[id number]]  (Abnormal) Collected: 07/22/25 1224    Lab Status: Final result Specimen: Urine, Clean Catch Updated: 07/22/25 1250     Amph/Meth UR Negative     Barbiturate Ur Negative     Benzodiazepine Urine Negative     Cocaine  Urine Negative     Methadone Urine Negative     Opiate Urine Negative     PCP Ur Negative     THC Urine Negative     Oxycodone Urine Negative     Fentanyl Urine Positive     HYDROCODONE URINE Negative    Narrative:      Presumptive report. If requested, specimen will be sent to reference lab for confirmation.  FOR MEDICAL PURPOSES ONLY.   IF CONFIRMATION NEEDED PLEASE CONTACT THE LAB WITHIN 5 DAYS.    Drug Screen Cutoff Levels:  AMPHETAMINE/METHAMPHETAMINES  1000 ng/mL  BARBITURATES     200 ng/mL  BENZODIAZEPINES     200 ng/mL  COCAINE      300 ng/mL  METHADONE      300 ng/mL  OPIATES      300 ng/mL  PHENCYCLIDINE     25 ng/mL  THC       50 ng/mL  OXYCODONE      100 ng/mL  FENTANYL      5 ng/mL  HYDROCODONE     300 ng/mL    HS Troponin I 2hr [[id number]]  (Normal) Collected: 07/22/25 1129    Lab Status: Final result Specimen: Blood from Arm, Left Updated: 07/22/25 1201     hs TnI 2hr 7 ng/L      Delta 2hr hsTnI 1 ng/L     Salicylate level [[id number]]  (Abnormal) Collected: 07/22/25 0941    Lab Status: Final result Specimen: Blood from Arm, Left Updated: 07/22/25 1009     Salicylate Lvl <5 mg/dL     Acetaminophen level-If concentration is detectable, please discuss with medical  on call. [[id number]]  (Abnormal) Collected: 07/22/25 0941    Lab Status: Final result Specimen: Blood from Arm, Left Updated: 07/22/25 1009     Acetaminophen Level <2 ug/mL     HS Troponin 0hr (reflex protocol) [[id number]]  (Normal) Collected: 07/22/25 0941    Lab Status: Final result Specimen: Blood from Arm, Left Updated: 07/22/25 1009     hs TnI 0hr 6 ng/L     Comprehensive metabolic panel [[id number]]  (Abnormal) Collected: 07/22/25 0941    Lab Status: Final result Specimen: Blood from Arm, Left Updated: 07/22/25 1002     Sodium 145 mmol/L      Potassium 3.6 mmol/L      Chloride 107 mmol/L      CO2 24 mmol/L      ANION GAP 14 mmol/L      BUN 12 mg/dL      Creatinine 1.74 mg/dL      Glucose 163 mg/dL      Calcium 8.9 mg/dL       AST 11 U/L      ALT 11 U/L      Alkaline Phosphatase 54 U/L      Total Protein 7.1 g/dL      Albumin 4.5 g/dL      Total Bilirubin 0.34 mg/dL      eGFR 45 ml/min/1.73sq m     Narrative:      National Kidney Disease Foundation guidelines for Chronic Kidney Disease (CKD):     Stage 1 with normal or high GFR (GFR > 90 mL/min/1.73 square meters)    Stage 2 Mild CKD (GFR = 60-89 mL/min/1.73 square meters)    Stage 3A Moderate CKD (GFR = 45-59 mL/min/1.73 square meters)    Stage 3B Moderate CKD (GFR = 30-44 mL/min/1.73 square meters)    Stage 4 Severe CKD (GFR = 15-29 mL/min/1.73 square meters)    Stage 5 End Stage CKD (GFR <15 mL/min/1.73 square meters)  Note: GFR calculation is accurate only with a steady state creatinine    CK [[id number]]  (Normal) Collected: 07/22/25 0941    Lab Status: Final result Specimen: Blood from Arm, Left Updated: 07/22/25 1002     Total  U/L     Ethanol [[id number]]  (Abnormal) Collected: 07/22/25 0941    Lab Status: Final result Specimen: Blood from Arm, Left Updated: 07/22/25 1000     Ethanol Lvl 111 mg/dL     CBC and differential [[id number]] Collected: 07/22/25 0941    Lab Status: Final result Specimen: Blood from Arm, Left Updated: 07/22/25 0946     WBC 8.26 Thousand/uL      RBC 4.16 Million/uL      Hemoglobin 12.5 g/dL      Hematocrit 38.4 %      MCV 92 fL      MCH 30.0 pg      MCHC 32.6 g/dL      RDW 12.4 %      MPV 9.5 fL      Platelets 360 Thousands/uL      nRBC 0 /100 WBCs      Segmented % 70 %      Immature Grans % 0 %      Lymphocytes % 23 %      Monocytes % 5 %      Eosinophils Relative 1 %      Basophils Relative 1 %      Absolute Neutrophils 5.77 Thousands/µL      Absolute Immature Grans 0.02 Thousand/uL      Absolute Lymphocytes 1.93 Thousands/µL      Absolute Monocytes 0.39 Thousand/µL      Eosinophils Absolute 0.05 Thousand/µL      Basophils Absolute 0.10 Thousands/µL             No orders to display       ECG 12 Lead Documentation Only    Date/Time: 7/22/2025 10:01  AM    Performed by: Martir Dominguez MD  Authorized by: Martir Dominguez MD    ECG reviewed by me, the ED Provider: yes    Patient location:  ED  Previous ECG:     Previous ECG:  Compared to current    Similarity:  Changes noted    Comparison to cardiac monitor: Yes    Interpretation:     Interpretation: abnormal    Rate:     ECG rate assessment: normal    Rhythm:     Rhythm: sinus rhythm    Ectopy:     Ectopy: none    QRS:     QRS axis:  Normal  Conduction:     Conduction: normal    ST segments:     ST segments:  Non-specific  T waves:     T waves: normal    ECG 12 Lead Documentation Only    Date/Time: 7/22/2025 11:34 AM    Performed by: Martir Dominguez MD  Authorized by: Martir Dominguez MD    ECG reviewed by me, the ED Provider: yes    Patient location:  ED  Previous ECG:     Previous ECG:  Compared to current    Similarity:  Changes noted    Comparison to cardiac monitor: Yes    Interpretation:     Interpretation: normal    Rate:     ECG rate assessment: normal    Rhythm:     Rhythm: sinus rhythm    Ectopy:     Ectopy: none    QRS:     QRS axis:  Normal  Conduction:     Conduction: normal    ST segments:     ST segments:  Normal  T waves:     T waves: normal        ED Medication and Procedure Management   Prior to Admission Medications   Prescriptions Last Dose Informant Patient Reported? Taking?   busPIRone (BUSPAR) 15 mg tablet 7/22/2025  No Yes   Sig: Take 1 tablet (15 mg total) by mouth 2 (two) times a day   risperiDONE (RisperDAL) 4 mg tablet 7/22/2025  No Yes   Sig: Take 1 tablet (4 mg total) by mouth 2 (two) times a day   sertraline (ZOLOFT) 100 mg tablet 7/22/2025  No Yes   Sig: Take 1 tablet (100 mg total) by mouth daily   traZODone (DESYREL) 100 mg tablet 7/21/2025  No Yes   Sig: Take 1 tablet (100 mg total) by mouth daily at bedtime      Facility-Administered Medications: None     Discharge Medication List as of 7/22/2025  1:15 PM        CONTINUE these medications which have NOT  CHANGED    Details   busPIRone (BUSPAR) 15 mg tablet Take 1 tablet (15 mg total) by mouth 2 (two) times a day, Starting Mon 7/21/2025, Normal      risperiDONE (RisperDAL) 4 mg tablet Take 1 tablet (4 mg total) by mouth 2 (two) times a day, Starting Mon 7/21/2025, Normal      sertraline (ZOLOFT) 100 mg tablet Take 1 tablet (100 mg total) by mouth daily, Starting Mon 7/21/2025, Normal      traZODone (DESYREL) 100 mg tablet Take 1 tablet (100 mg total) by mouth daily at bedtime, Starting Mon 7/21/2025, Normal           No discharge procedures on file.  ED SEPSIS DOCUMENTATION   Time reflects when diagnosis was documented in both MDM as applicable and the Disposition within this note       Time User Action Codes Description Comment    7/22/2025 12:26 PM Martir Dominguez [F11.10] Opioid abuse (HCC)     7/22/2025 12:26 PM Martir Dominguez [F10.929] Alcohol intoxication (HCC)     7/22/2025 12:26 PM Martir Dominguez [R41.82] Altered mental status     7/22/2025 12:26 PM Martir Dominguez [R79.89] Elevated serum creatinine                    [1]   Past Medical History:  Diagnosis Date    Chronic back pain    [2]   Past Surgical History:  Procedure Laterality Date    WISDOM TOOTH EXTRACTION     [3]   Family History  Problem Relation Name Age of Onset    Cirrhosis Mother      Cholelithiasis Mother      Diabetes type II Father      Stroke Maternal Grandfather      Diabetes Paternal Grandfather     [4]   Social History  Tobacco Use    Smoking status: Former     Types: Cigars    Smokeless tobacco: Never    Tobacco comments:     has a cigar about 2/month   Vaping Use    Vaping status: Never Used   Substance Use Topics    Alcohol use: Yes     Alcohol/week: 2.0 standard drinks of alcohol     Types: 1 Glasses of wine, 1 Cans of beer per week     Comment: 3 drinks/week    Drug use: Not Currently     Types: Marijuana, Cocaine, Oxycodone     Comment: snorted cocain about 10 years ago for 1 year        Martir BOWEN  MD Alberto  07/22/25 1548

## 2025-07-22 NOTE — ED NOTES
Discharge instructions reviewed with pt. Pt verbalized understanding. And has no further questions at this time. Pt ambulatory off unit with steady gait.      Jesi Savage RN  07/22/25 2114

## 2025-07-22 NOTE — ED NOTES
Pt took own IV after receiving Narcan from provider. Educated patient on that staff has to take IV out.      Jesi Savage, SUZI  07/22/25 3962

## 2025-07-22 NOTE — ED CARE HANDOFF
[facility] Warm Handoff Outcome Note    Patient name Ibrahima Aadms  Location ED 10/ED 10 MRN [id number]  Age: 50 y.o.          Plan Type:  Warm Handoff                                                                                    Plan Date: 7/22/2025  Service:  ED Warm Handoff      Substance Use History:  Alcohol    Warm Handoff Update:  Patient left ED    Warm Handoff Outcome: Patient Refused

## 2025-07-23 LAB
ATRIAL RATE: 123 BPM
ATRIAL RATE: 74 BPM
P AXIS: 75 DEGREES
P AXIS: 76 DEGREES
PR INTERVAL: 122 MS
PR INTERVAL: 142 MS
QRS AXIS: 55 DEGREES
QRS AXIS: 75 DEGREES
QRSD INTERVAL: 82 MS
QRSD INTERVAL: 86 MS
QT INTERVAL: 366 MS
QT INTERVAL: 400 MS
QTC INTERVAL: 444 MS
QTC INTERVAL: 523 MS
T WAVE AXIS: 51 DEGREES
T WAVE AXIS: 68 DEGREES
VENTRICULAR RATE: 123 BPM
VENTRICULAR RATE: 74 BPM

## 2025-07-23 PROCEDURE — 93010 ELECTROCARDIOGRAM REPORT: CPT | Performed by: INTERNAL MEDICINE
